# Patient Record
Sex: FEMALE | Race: WHITE | Employment: UNEMPLOYED | URBAN - NONMETROPOLITAN AREA
[De-identification: names, ages, dates, MRNs, and addresses within clinical notes are randomized per-mention and may not be internally consistent; named-entity substitution may affect disease eponyms.]

---

## 2018-02-15 ENCOUNTER — HOSPITAL ENCOUNTER (EMERGENCY)
Age: 43
Discharge: HOME | End: 2018-02-15
Attending: EMERGENCY MEDICINE
Payer: COMMERCIAL

## 2018-02-15 ENCOUNTER — APPOINTMENT (OUTPATIENT)
Dept: GENERAL RADIOLOGY | Age: 43
End: 2018-02-15
Payer: COMMERCIAL

## 2018-02-15 VITALS
HEART RATE: 85 BPM | OXYGEN SATURATION: 94 % | DIASTOLIC BLOOD PRESSURE: 77 MMHG | TEMPERATURE: 100.5 F | RESPIRATION RATE: 16 BRPM | SYSTOLIC BLOOD PRESSURE: 121 MMHG

## 2018-02-15 DIAGNOSIS — J11.1 INFLUENZA WITH RESPIRATORY MANIFESTATION OTHER THAN PNEUMONIA: Primary | ICD-10-CM

## 2018-02-15 LAB
DIRECT EXAM: NORMAL
Lab: NORMAL
SPECIMEN DESCRIPTION: NORMAL
STATUS: NORMAL

## 2018-02-15 PROCEDURE — 71046 X-RAY EXAM CHEST 2 VIEWS: CPT

## 2018-02-15 PROCEDURE — 99283 EMERGENCY DEPT VISIT LOW MDM: CPT

## 2018-02-15 PROCEDURE — 87804 INFLUENZA ASSAY W/OPTIC: CPT

## 2018-02-15 PROCEDURE — 87651 STREP A DNA AMP PROBE: CPT

## 2018-02-15 RX ORDER — BENZONATATE 100 MG/1
100 CAPSULE ORAL EVERY 4 HOURS PRN
Qty: 30 CAPSULE | Refills: 0 | Status: SHIPPED | OUTPATIENT
Start: 2018-02-15 | End: 2018-02-22

## 2018-02-15 RX ORDER — OSELTAMIVIR PHOSPHATE 75 MG/1
75 CAPSULE ORAL 2 TIMES DAILY
Qty: 10 CAPSULE | Refills: 0 | Status: SHIPPED | OUTPATIENT
Start: 2018-02-15 | End: 2018-02-20

## 2018-02-15 RX ORDER — ONDANSETRON 4 MG/1
4 TABLET, FILM COATED ORAL EVERY 4 HOURS PRN
Qty: 15 TABLET | Refills: 0 | Status: SHIPPED | OUTPATIENT
Start: 2018-02-15

## 2018-02-15 ASSESSMENT — ENCOUNTER SYMPTOMS
CONSTIPATION: 0
TROUBLE SWALLOWING: 0
VOMITING: 1
COLOR CHANGE: 0
NAUSEA: 1
COUGH: 1
BACK PAIN: 0
DIARRHEA: 0
SHORTNESS OF BREATH: 0
EYE DISCHARGE: 0
ABDOMINAL PAIN: 0
WHEEZING: 0
SORE THROAT: 1
ABDOMINAL DISTENTION: 0

## 2018-02-21 ENCOUNTER — HOSPITAL ENCOUNTER (EMERGENCY)
Age: 43
Discharge: ANOTHER HOSPITAL | End: 2018-02-22
Attending: EMERGENCY MEDICINE
Payer: COMMERCIAL

## 2018-02-21 ENCOUNTER — APPOINTMENT (OUTPATIENT)
Dept: CT IMAGING | Age: 43
End: 2018-02-21
Payer: COMMERCIAL

## 2018-02-21 VITALS
SYSTOLIC BLOOD PRESSURE: 130 MMHG | HEIGHT: 72 IN | BODY MASS INDEX: 29.12 KG/M2 | DIASTOLIC BLOOD PRESSURE: 89 MMHG | OXYGEN SATURATION: 94 % | TEMPERATURE: 99.4 F | RESPIRATION RATE: 18 BRPM | HEART RATE: 86 BPM | WEIGHT: 215 LBS

## 2018-02-21 DIAGNOSIS — R56.9 SEIZURE (HCC): Primary | ICD-10-CM

## 2018-02-21 LAB
ALBUMIN SERPL-MCNC: 3.7 G/DL (ref 3.5–5.2)
ALBUMIN/GLOBULIN RATIO: 1.2 (ref 1–2.5)
ALP BLD-CCNC: 73 U/L (ref 35–104)
ALT SERPL-CCNC: 15 U/L (ref 5–33)
ANION GAP SERPL CALCULATED.3IONS-SCNC: 11 MMOL/L (ref 9–17)
AST SERPL-CCNC: 18 U/L
BILIRUB SERPL-MCNC: 0.39 MG/DL (ref 0.3–1.2)
BUN BLDV-MCNC: 9 MG/DL (ref 6–20)
BUN/CREAT BLD: 16 (ref 9–20)
CALCIUM SERPL-MCNC: 9.2 MG/DL (ref 8.6–10.4)
CHLORIDE BLD-SCNC: 101 MMOL/L (ref 98–107)
CO2: 27 MMOL/L (ref 20–31)
CREAT SERPL-MCNC: 0.55 MG/DL (ref 0.5–0.9)
EKG ATRIAL RATE: 83 BPM
EKG P AXIS: 48 DEGREES
EKG P-R INTERVAL: 154 MS
EKG Q-T INTERVAL: 374 MS
EKG QRS DURATION: 84 MS
EKG QTC CALCULATION (BAZETT): 439 MS
EKG R AXIS: 68 DEGREES
EKG T AXIS: 25 DEGREES
EKG VENTRICULAR RATE: 83 BPM
GFR AFRICAN AMERICAN: >60 ML/MIN
GFR NON-AFRICAN AMERICAN: >60 ML/MIN
GFR SERPL CREATININE-BSD FRML MDRD: ABNORMAL ML/MIN/{1.73_M2}
GFR SERPL CREATININE-BSD FRML MDRD: ABNORMAL ML/MIN/{1.73_M2}
GLUCOSE BLD-MCNC: 295 MG/DL (ref 70–99)
HCT VFR BLD CALC: 38.8 % (ref 36–46)
HEMOGLOBIN: 12.7 G/DL (ref 12–16)
INR BLD: 1 (ref 0.9–1.2)
MCH RBC QN AUTO: 27.7 PG (ref 26–34)
MCHC RBC AUTO-ENTMCNC: 32.8 G/DL (ref 31–37)
MCV RBC AUTO: 84.4 FL (ref 80–100)
NRBC AUTOMATED: NORMAL PER 100 WBC
PDW BLD-RTO: 12.8 % (ref 12.1–15.2)
PLATELET # BLD: 336 K/UL (ref 140–450)
PMV BLD AUTO: 8.4 FL (ref 6–12)
POTASSIUM SERPL-SCNC: 4.4 MMOL/L (ref 3.7–5.3)
PROTHROMBIN TIME: 10 SEC (ref 9.7–12.2)
RBC # BLD: 4.6 M/UL (ref 4–5.2)
SODIUM BLD-SCNC: 139 MMOL/L (ref 135–144)
TOTAL PROTEIN: 6.9 G/DL (ref 6.4–8.3)
TROPONIN INTERP: NORMAL
TROPONIN T: <0.03 NG/ML
WBC # BLD: 8.8 K/UL (ref 3.5–11)

## 2018-02-21 PROCEDURE — 70450 CT HEAD/BRAIN W/O DYE: CPT

## 2018-02-21 PROCEDURE — 99285 EMERGENCY DEPT VISIT HI MDM: CPT

## 2018-02-21 PROCEDURE — 96374 THER/PROPH/DIAG INJ IV PUSH: CPT

## 2018-02-21 PROCEDURE — 80053 COMPREHEN METABOLIC PANEL: CPT

## 2018-02-21 PROCEDURE — 85027 COMPLETE CBC AUTOMATED: CPT

## 2018-02-21 PROCEDURE — 85610 PROTHROMBIN TIME: CPT

## 2018-02-21 PROCEDURE — 93005 ELECTROCARDIOGRAM TRACING: CPT

## 2018-02-21 PROCEDURE — 6360000002 HC RX W HCPCS: Performed by: EMERGENCY MEDICINE

## 2018-02-21 PROCEDURE — 84484 ASSAY OF TROPONIN QUANT: CPT

## 2018-02-21 RX ORDER — ONDANSETRON 2 MG/ML
4 INJECTION INTRAMUSCULAR; INTRAVENOUS ONCE
Status: COMPLETED | OUTPATIENT
Start: 2018-02-21 | End: 2018-02-21

## 2018-02-21 RX ORDER — OSELTAMIVIR PHOSPHATE 75 MG/1
75 CAPSULE ORAL 2 TIMES DAILY
COMMUNITY
End: 2018-03-26 | Stop reason: ALTCHOICE

## 2018-02-21 RX ADMIN — ONDANSETRON 4 MG: 2 INJECTION INTRAMUSCULAR; INTRAVENOUS at 20:48

## 2018-02-21 ASSESSMENT — ENCOUNTER SYMPTOMS
RESPIRATORY NEGATIVE: 1
SINUS PAIN: 1
VOICE CHANGE: 0
GASTROINTESTINAL NEGATIVE: 1
SINUS PRESSURE: 1
TROUBLE SWALLOWING: 0

## 2018-02-22 PROCEDURE — 6360000002 HC RX W HCPCS: Performed by: EMERGENCY MEDICINE

## 2018-02-22 PROCEDURE — 96375 TX/PRO/DX INJ NEW DRUG ADDON: CPT

## 2018-02-22 RX ORDER — PROMETHAZINE HYDROCHLORIDE 25 MG/ML
25 INJECTION, SOLUTION INTRAMUSCULAR; INTRAVENOUS ONCE
Status: COMPLETED | OUTPATIENT
Start: 2018-02-22 | End: 2018-02-22

## 2018-02-22 RX ADMIN — PROMETHAZINE HYDROCHLORIDE 25 MG: 25 INJECTION, SOLUTION INTRAMUSCULAR; INTRAVENOUS at 01:30

## 2018-02-22 NOTE — ED PROVIDER NOTES
[facility] ED  eMERGENCY dEPARTMENT eNCOUnter      Pt Name: Rocio Lucas  MRN: [id number]  [place] 1975  Date of evaluation: 2/21/2018  Provider: Shaka Conrad MD    CHIEF COMPLAINT       Chief Complaint   Patient presents with    Seizures     Onset 1855 at home. Per  \"her arms and legs went straight out and her eyes started jogging. It lasted like 30 seconds. She had some facial droop during this time on the right side\"         HISTORY OF PRESENT ILLNESS   (Location/Symptom, Timing/Onset, Context/Setting, Quality, Duration, Modifying Factors, Severity)  Note limiting factors. Rocio Lucas is a 43 y.o. female who presents to the emergency department       This is a 54-year-old female who is brought in by her  and after possible seizure activity. She was sitting at the table and just cooked dinner.  says she remained quiet and her head was tilted to the side and her eyes rolled behind the back of her head and she started shaking we'll primarily in the upper extremities. He said this lasted approximately 90 seconds he did move her to the ground and it was approximately 30 minutes later until she regained full consciousness as EMS arrived. She did appear to bite her tongue. She has no previous history of seizures she was having history of migraines and she has had a mild headache over the last day and a half but this has been her typical.  He certainly denies worsening of her life or any neurologic deficits. Her  says that after the seizure she had some right-sided facial droop. He is familiar with seizures and strokes and feels certain that it was present initially and resolved over the course of time before EMS arrived. Nursing Notes were reviewed. REVIEW OF SYSTEMS    (2-9 systems for level 4, 10 or more for level 5)     Review of Systems   Constitutional: Negative. HENT: Positive for mouth sores, sinus pain and sinus pressure.  Negative

## 2018-02-22 NOTE — ED NOTES
Bed: 12  Expected date: 2/21/18  Expected time: 7:38 PM  Means of arrival:   Comments:  antonio     Marco Antonio Mick  02/21/18 1946

## 2018-03-26 ENCOUNTER — HOSPITAL ENCOUNTER (EMERGENCY)
Age: 43
Discharge: HOME | End: 2018-03-26
Attending: EMERGENCY MEDICINE
Payer: COMMERCIAL

## 2018-03-26 VITALS
HEART RATE: 86 BPM | OXYGEN SATURATION: 99 % | SYSTOLIC BLOOD PRESSURE: 142 MMHG | TEMPERATURE: 98.3 F | DIASTOLIC BLOOD PRESSURE: 90 MMHG | RESPIRATION RATE: 16 BRPM

## 2018-03-26 DIAGNOSIS — N39.0 URINARY TRACT INFECTION WITH HEMATURIA, SITE UNSPECIFIED: Primary | ICD-10-CM

## 2018-03-26 DIAGNOSIS — R31.9 URINARY TRACT INFECTION WITH HEMATURIA, SITE UNSPECIFIED: Primary | ICD-10-CM

## 2018-03-26 LAB
-: ABNORMAL
AMORPHOUS: ABNORMAL
BACTERIA: ABNORMAL
BILIRUBIN URINE: NEGATIVE
CASTS UA: ABNORMAL /LPF
COLOR: YELLOW
COMMENT UA: ABNORMAL
CRYSTALS, UA: ABNORMAL /HPF
EPITHELIAL CELLS UA: ABNORMAL /HPF (ref 0–25)
GLUCOSE URINE: ABNORMAL
KETONES, URINE: NEGATIVE
LEUKOCYTE ESTERASE, URINE: NEGATIVE
MUCUS: ABNORMAL
NITRITE, URINE: POSITIVE
OTHER OBSERVATIONS UA: ABNORMAL
PH UA: 5.5 (ref 5–9)
PROTEIN UA: ABNORMAL
RBC UA: ABNORMAL /HPF (ref 0–2)
RENAL EPITHELIAL, UA: ABNORMAL /HPF
SPECIFIC GRAVITY UA: 1.02 (ref 1.01–1.02)
TRICHOMONAS: ABNORMAL
TURBIDITY: CLEAR
URINE HGB: ABNORMAL
UROBILINOGEN, URINE: NORMAL
WBC UA: ABNORMAL /HPF (ref 0–5)
YEAST: ABNORMAL

## 2018-03-26 PROCEDURE — 81001 URINALYSIS AUTO W/SCOPE: CPT

## 2018-03-26 PROCEDURE — 87086 URINE CULTURE/COLONY COUNT: CPT

## 2018-03-26 PROCEDURE — 99283 EMERGENCY DEPT VISIT LOW MDM: CPT

## 2018-03-26 PROCEDURE — 6370000000 HC RX 637 (ALT 250 FOR IP): Performed by: EMERGENCY MEDICINE

## 2018-03-26 PROCEDURE — 87088 URINE BACTERIA CULTURE: CPT

## 2018-03-26 PROCEDURE — 87186 SC STD MICRODIL/AGAR DIL: CPT

## 2018-03-26 RX ORDER — CEPHALEXIN 500 MG/1
500 CAPSULE ORAL 2 TIMES DAILY
Qty: 14 CAPSULE | Refills: 0 | Status: SHIPPED | OUTPATIENT
Start: 2018-03-26 | End: 2018-04-02

## 2018-03-26 RX ORDER — CEPHALEXIN 500 MG/1
500 CAPSULE ORAL ONCE
Status: COMPLETED | OUTPATIENT
Start: 2018-03-26 | End: 2018-03-26

## 2018-03-26 RX ADMIN — CEPHALEXIN 500 MG: 500 CAPSULE ORAL at 15:33

## 2018-03-26 ASSESSMENT — ENCOUNTER SYMPTOMS
SHORTNESS OF BREATH: 0
VOMITING: 0
DIARRHEA: 0
ABDOMINAL DISTENTION: 0
RHINORRHEA: 0
NAUSEA: 0
WHEEZING: 0
COUGH: 0
SORE THROAT: 0
CONSTIPATION: 0

## 2018-03-26 ASSESSMENT — PAIN DESCRIPTION - DESCRIPTORS: DESCRIPTORS: BURNING

## 2018-03-26 ASSESSMENT — PAIN DESCRIPTION - FREQUENCY: FREQUENCY: CONTINUOUS

## 2018-03-26 ASSESSMENT — PAIN DESCRIPTION - LOCATION: LOCATION: OTHER (COMMENT)

## 2018-03-26 ASSESSMENT — PAIN DESCRIPTION - PAIN TYPE: TYPE: ACUTE PAIN

## 2018-03-26 ASSESSMENT — PAIN SCALES - GENERAL: PAINLEVEL_OUTOF10: 7

## 2018-03-26 NOTE — ED PROVIDER NOTES
decreased urine volume, menstrual problem, vaginal discharge and vaginal pain. Skin: Negative for rash. Neurological: Negative for dizziness, weakness, light-headedness, numbness and headaches. PHYSICAL EXAM   (up to 7 for level 4, 8 or more for level 5)      INITIAL VITALS:   BP (!) 159/91   Pulse 86   Temp 98.3 °F (36.8 °C) (Tympanic)   Resp 16   SpO2 98%     Physical Exam   Constitutional: She is oriented to person, place, and time. Nontoxic appearing, answering all questions appropriately no signs of distress   HENT:   Head: Normocephalic and atraumatic. Eyes: Conjunctivae are normal. Right eye exhibits no discharge. Left eye exhibits no discharge. Cardiovascular: Normal rate, regular rhythm and normal heart sounds. Exam reveals no gallop and no friction rub. No murmur heard. Pulmonary/Chest: Effort normal and breath sounds normal. No respiratory distress. She has no wheezes. She has no rales. She exhibits no tenderness. Abdominal: Soft. She exhibits no distension and no mass. There is tenderness. There is no rebound and no guarding. Mild suprapubic tenderness to palpation, no CVA tenderness noted bilaterally   Neurological: She is alert and oriented to person, place, and time. Skin: Skin is warm and dry. No rash noted. Nursing note and vitals reviewed. DIFFERENTIAL  DIAGNOSIS     Hematuria, suprapubic pain, patient is status post hysterectomy, concern for UTI, plan for urinalysis. sHe is afebrile, no CVA tenderness.     PLAN (LABS / IMAGING / EKG):  Orders Placed This Encounter   Procedures    Urinalysis    Microscopic Urinalysis       MEDICATIONS ORDERED:  Orders Placed This Encounter   Medications    cephALEXin (KEFLEX) 500 MG capsule     Sig: Take 1 capsule by mouth 2 times daily for 7 days     Dispense:  14 capsule     Refill:  0       DIAGNOSTIC RESULTS / EMERGENCY DEPARTMENT COURSE / MDM     LABS:  Results for orders placed or performed during the hospital

## 2018-03-28 LAB
CULTURE: ABNORMAL
CULTURE: ABNORMAL
Lab: ABNORMAL
Lab: ABNORMAL
ORGANISM: ABNORMAL
SPECIMEN DESCRIPTION: ABNORMAL
SPECIMEN DESCRIPTION: ABNORMAL
STATUS: ABNORMAL

## 2018-09-18 ENCOUNTER — HOSPITAL ENCOUNTER (OUTPATIENT)
Age: 43
Discharge: HOME | End: 2018-09-18
Payer: COMMERCIAL

## 2018-09-18 LAB
C-PEPTIDE: 0.1 NG/ML (ref 1.1–4.4)
ESTIMATED AVERAGE GLUCOSE: 255 MG/DL
HBA1C MFR BLD: 10.5 % (ref 4.8–5.9)

## 2018-09-18 PROCEDURE — 83036 HEMOGLOBIN GLYCOSYLATED A1C: CPT

## 2018-09-18 PROCEDURE — 36415 COLL VENOUS BLD VENIPUNCTURE: CPT

## 2018-09-18 PROCEDURE — 84681 ASSAY OF C-PEPTIDE: CPT

## 2018-09-28 ENCOUNTER — HOSPITAL ENCOUNTER (EMERGENCY)
Age: 43
Discharge: ANOTHER HOSPITAL | End: 2018-09-28
Payer: COMMERCIAL

## 2018-09-28 ENCOUNTER — APPOINTMENT (OUTPATIENT)
Dept: GENERAL RADIOLOGY | Age: 43
End: 2018-09-28
Payer: COMMERCIAL

## 2018-09-28 ENCOUNTER — APPOINTMENT (OUTPATIENT)
Dept: CT IMAGING | Age: 43
End: 2018-09-28
Payer: COMMERCIAL

## 2018-09-28 VITALS
SYSTOLIC BLOOD PRESSURE: 129 MMHG | HEART RATE: 93 BPM | BODY MASS INDEX: 29.03 KG/M2 | OXYGEN SATURATION: 98 % | DIASTOLIC BLOOD PRESSURE: 84 MMHG | WEIGHT: 220 LBS | RESPIRATION RATE: 16 BRPM | TEMPERATURE: 98.4 F

## 2018-09-28 DIAGNOSIS — R56.9 SEIZURE (HCC): Primary | ICD-10-CM

## 2018-09-28 LAB
ABSOLUTE EOS #: 0.12 K/UL (ref 0–0.44)
ABSOLUTE IMMATURE GRANULOCYTE: 0.04 K/UL (ref 0–0.3)
ABSOLUTE LYMPH #: 5.23 K/UL (ref 1.1–3.7)
ABSOLUTE MONO #: 1.1 K/UL (ref 0.1–1.2)
ALBUMIN SERPL-MCNC: 4.6 G/DL (ref 3.5–5.2)
ALBUMIN/GLOBULIN RATIO: 1.5 (ref 1–2.5)
ALP BLD-CCNC: 87 U/L (ref 35–104)
ALT SERPL-CCNC: 10 U/L (ref 5–33)
AMPHETAMINE SCREEN URINE: NEGATIVE
ANION GAP SERPL CALCULATED.3IONS-SCNC: 27 MMOL/L (ref 9–17)
AST SERPL-CCNC: 14 U/L
BARBITURATE SCREEN URINE: NEGATIVE
BASOPHILS # BLD: 1 % (ref 0–2)
BASOPHILS ABSOLUTE: 0.07 K/UL (ref 0–0.2)
BENZODIAZEPINE SCREEN, URINE: NEGATIVE
BILIRUB SERPL-MCNC: 0.4 MG/DL (ref 0.3–1.2)
BUN BLDV-MCNC: 9 MG/DL (ref 6–20)
BUN/CREAT BLD: 11 (ref 9–20)
BUPRENORPHINE URINE: NEGATIVE
CALCIUM SERPL-MCNC: 9.3 MG/DL (ref 8.6–10.4)
CANNABINOID SCREEN URINE: NEGATIVE
CHLORIDE BLD-SCNC: 94 MMOL/L (ref 98–107)
CO2: 17 MMOL/L (ref 20–31)
COCAINE METABOLITE, URINE: NEGATIVE
CREAT SERPL-MCNC: 0.81 MG/DL (ref 0.5–0.9)
DIFFERENTIAL TYPE: ABNORMAL
EOSINOPHILS RELATIVE PERCENT: 1 % (ref 1–4)
GFR AFRICAN AMERICAN: >60 ML/MIN
GFR NON-AFRICAN AMERICAN: >60 ML/MIN
GFR SERPL CREATININE-BSD FRML MDRD: ABNORMAL ML/MIN/{1.73_M2}
GFR SERPL CREATININE-BSD FRML MDRD: ABNORMAL ML/MIN/{1.73_M2}
GLUCOSE BLD-MCNC: 137 MG/DL (ref 74–100)
GLUCOSE BLD-MCNC: 247 MG/DL (ref 70–99)
HCT VFR BLD CALC: 46.7 % (ref 36.3–47.1)
HEMOGLOBIN: 14.2 G/DL (ref 11.9–15.1)
IMMATURE GRANULOCYTES: 0 %
LYMPHOCYTES # BLD: 36 % (ref 24–43)
MCH RBC QN AUTO: 28.4 PG (ref 25.2–33.5)
MCHC RBC AUTO-ENTMCNC: 30.4 G/DL (ref 28.4–34.8)
MCV RBC AUTO: 93.4 FL (ref 82.6–102.9)
MDMA URINE: NORMAL
METHADONE SCREEN, URINE: NEGATIVE
METHAMPHETAMINE, URINE: NEGATIVE
MONOCYTES # BLD: 8 % (ref 3–12)
NRBC AUTOMATED: 0 PER 100 WBC
OPIATES, URINE: NEGATIVE
OXYCODONE SCREEN URINE: NEGATIVE
PDW BLD-RTO: 12.4 % (ref 11.8–14.4)
PHENCYCLIDINE, URINE: NEGATIVE
PLATELET # BLD: 325 K/UL (ref 138–453)
PLATELET ESTIMATE: ABNORMAL
PMV BLD AUTO: 10.9 FL (ref 8.1–13.5)
POTASSIUM SERPL-SCNC: 3.8 MMOL/L (ref 3.7–5.3)
PROPOXYPHENE, URINE: NEGATIVE
RBC # BLD: 5 M/UL (ref 3.95–5.11)
RBC # BLD: ABNORMAL 10*6/UL
SEG NEUTROPHILS: 54 % (ref 36–65)
SEGMENTED NEUTROPHILS ABSOLUTE COUNT: 7.87 K/UL (ref 1.5–8.1)
SODIUM BLD-SCNC: 138 MMOL/L (ref 135–144)
TEST INFORMATION: NORMAL
TOTAL PROTEIN: 7.7 G/DL (ref 6.4–8.3)
TRICYCLIC ANTIDEPRESSANTS, UR: NEGATIVE
WBC # BLD: 14.4 K/UL (ref 3.5–11.3)
WBC # BLD: ABNORMAL 10*3/UL

## 2018-09-28 PROCEDURE — 80306 DRUG TEST PRSMV INSTRMNT: CPT

## 2018-09-28 PROCEDURE — 85025 COMPLETE CBC W/AUTO DIFF WBC: CPT

## 2018-09-28 PROCEDURE — 6370000000 HC RX 637 (ALT 250 FOR IP): Performed by: PHYSICIAN ASSISTANT

## 2018-09-28 PROCEDURE — 80053 COMPREHEN METABOLIC PANEL: CPT

## 2018-09-28 PROCEDURE — 71046 X-RAY EXAM CHEST 2 VIEWS: CPT

## 2018-09-28 PROCEDURE — 99285 EMERGENCY DEPT VISIT HI MDM: CPT

## 2018-09-28 PROCEDURE — 70450 CT HEAD/BRAIN W/O DYE: CPT

## 2018-09-28 PROCEDURE — 82947 ASSAY GLUCOSE BLOOD QUANT: CPT

## 2018-09-28 RX ORDER — NICOTINE 21 MG/24HR
1 PATCH, TRANSDERMAL 24 HOURS TRANSDERMAL DAILY
Status: DISCONTINUED | OUTPATIENT
Start: 2018-09-28 | End: 2018-09-29 | Stop reason: HOSPADM

## 2018-09-28 RX ORDER — IBUPROFEN 600 MG/1
600 TABLET ORAL ONCE
Status: COMPLETED | OUTPATIENT
Start: 2018-09-28 | End: 2018-09-28

## 2018-09-28 RX ADMIN — IBUPROFEN 600 MG: 600 TABLET ORAL at 18:40

## 2018-09-28 ASSESSMENT — ENCOUNTER SYMPTOMS
WHEEZING: 0
SHORTNESS OF BREATH: 0
VOMITING: 0
PHOTOPHOBIA: 0
NAUSEA: 0

## 2018-09-28 ASSESSMENT — PAIN DESCRIPTION - LOCATION: LOCATION: HEAD

## 2018-09-28 ASSESSMENT — PAIN DESCRIPTION - PAIN TYPE: TYPE: ACUTE PAIN

## 2018-09-28 ASSESSMENT — PAIN SCALES - GENERAL: PAINLEVEL_OUTOF10: 5

## 2018-09-28 ASSESSMENT — PAIN DESCRIPTION - DESCRIPTORS: DESCRIPTORS: ACHING

## 2018-09-28 NOTE — ED PROVIDER NOTES
HPI & PMH otherwise negative    PHYSICAL EXAM    (up to 7 for level 4, 8 or more for level 5)     ED Triage Vitals [09/28/18 1618]   BP Temp Temp Source Pulse Resp SpO2 Height Weight   127/81 98.4 °F (36.9 °C) Tympanic 93 16 97 % -- 220 lb (99.8 kg)       Physical Exam   Constitutional: She is oriented to person, place, and time. She appears well-developed and well-nourished. HENT:   Head: Normocephalic and atraumatic. Eyes: Pupils are equal, round, and reactive to light. Conjunctivae are normal. No scleral icterus. Neck: Normal range of motion. Neck supple. Cardiovascular: Normal rate and regular rhythm. Pulmonary/Chest: Effort normal and breath sounds normal.   Abdominal: Soft. Bowel sounds are normal.   Musculoskeletal: Normal range of motion. She exhibits no tenderness. Neurological: She is alert and oriented to person, place, and time. No cranial nerve deficit. Coordination normal.   Skin: Skin is warm and dry. Psychiatric: She has a normal mood and affect. Her behavior is normal. Judgment and thought content normal.   Nursing note and vitals reviewed. DIAGNOSTIC RESULTS     EKG: (none if blank)  All EKG's are interpreted by the Emergency Department Physician who either signs or Co-signs this chart in the absence of a cardiologist.    Normal sinus rhythm without ischemic changes    RADIOLOGY: (none if blank)   Interpretation per the Radiologist below, if available at the time of this note:    CT Head WO Contrast   Final Result   No acute intracranial abnormality. XR CHEST STANDARD (2 VW)   Final Result   No acute intrathoracic process.              LABS:  Labs Reviewed   CBC WITH AUTO DIFFERENTIAL - Abnormal; Notable for the following:        Result Value    WBC 14.4 (*)     Absolute Lymph # 5.23 (*)     All other components within normal limits   COMPREHENSIVE METABOLIC PANEL - Abnormal; Notable for the following:     Glucose 247 (*)     Chloride 94 (*)     CO2 17 (*)     Anion Gap

## 2018-09-29 ENCOUNTER — HOSPITAL ENCOUNTER (INPATIENT)
Age: 43
LOS: 1 days | Discharge: HOME | DRG: 053 | End: 2018-09-29
Attending: INTERNAL MEDICINE | Admitting: INTERNAL MEDICINE
Payer: COMMERCIAL

## 2018-09-29 VITALS
WEIGHT: 220.24 LBS | OXYGEN SATURATION: 99 % | SYSTOLIC BLOOD PRESSURE: 123 MMHG | DIASTOLIC BLOOD PRESSURE: 69 MMHG | RESPIRATION RATE: 18 BRPM | BODY MASS INDEX: 29.83 KG/M2 | TEMPERATURE: 98.2 F | HEIGHT: 72 IN | HEART RATE: 80 BPM

## 2018-09-29 DIAGNOSIS — R56.9 SEIZURE (HCC): Primary | ICD-10-CM

## 2018-09-29 PROBLEM — Z96.41 INSULIN PUMP IN PLACE: Chronic | Status: ACTIVE | Noted: 2018-09-29

## 2018-09-29 PROBLEM — Z72.0 TOBACCO ABUSE DISORDER: Chronic | Status: ACTIVE | Noted: 2018-09-29

## 2018-09-29 PROBLEM — E10.9 TYPE 1 DIABETES MELLITUS WITHOUT COMPLICATION (HCC): Chronic | Status: ACTIVE | Noted: 2018-09-29

## 2018-09-29 LAB
GLUCOSE BLD-MCNC: 113 MG/DL (ref 65–105)
GLUCOSE BLD-MCNC: 180 MG/DL (ref 65–105)
GLUCOSE BLD-MCNC: 302 MG/DL (ref 65–105)
GLUCOSE BLD-MCNC: 402 MG/DL (ref 65–105)

## 2018-09-29 PROCEDURE — G0378 HOSPITAL OBSERVATION PER HR: HCPCS

## 2018-09-29 PROCEDURE — 6370000000 HC RX 637 (ALT 250 FOR IP): Performed by: NURSE PRACTITIONER

## 2018-09-29 PROCEDURE — 83036 HEMOGLOBIN GLYCOSYLATED A1C: CPT

## 2018-09-29 PROCEDURE — 2580000003 HC RX 258: Performed by: NURSE PRACTITIONER

## 2018-09-29 PROCEDURE — 82947 ASSAY GLUCOSE BLOOD QUANT: CPT

## 2018-09-29 PROCEDURE — 1200000000 HC SEMI PRIVATE

## 2018-09-29 PROCEDURE — 99254 IP/OBS CNSLTJ NEW/EST MOD 60: CPT | Performed by: PSYCHIATRY & NEUROLOGY

## 2018-09-29 PROCEDURE — 36415 COLL VENOUS BLD VENIPUNCTURE: CPT

## 2018-09-29 PROCEDURE — 6370000000 HC RX 637 (ALT 250 FOR IP): Performed by: INTERNAL MEDICINE

## 2018-09-29 PROCEDURE — G0379 DIRECT REFER HOSPITAL OBSERV: HCPCS

## 2018-09-29 PROCEDURE — 96372 THER/PROPH/DIAG INJ SC/IM: CPT

## 2018-09-29 PROCEDURE — 95816 EEG AWAKE AND DROWSY: CPT | Performed by: PSYCHIATRY & NEUROLOGY

## 2018-09-29 PROCEDURE — 6360000002 HC RX W HCPCS: Performed by: NURSE PRACTITIONER

## 2018-09-29 PROCEDURE — 95819 EEG AWAKE AND ASLEEP: CPT

## 2018-09-29 PROCEDURE — 99239 HOSP IP/OBS DSCHRG MGMT >30: CPT | Performed by: INTERNAL MEDICINE

## 2018-09-29 RX ORDER — ONDANSETRON 2 MG/ML
4 INJECTION INTRAMUSCULAR; INTRAVENOUS EVERY 6 HOURS PRN
Status: DISCONTINUED | OUTPATIENT
Start: 2018-09-29 | End: 2018-09-29 | Stop reason: HOSPADM

## 2018-09-29 RX ORDER — LEVETIRACETAM 500 MG/1
500 TABLET ORAL 2 TIMES DAILY
Qty: 60 TABLET | Refills: 3 | Status: SHIPPED | OUTPATIENT
Start: 2018-09-29 | End: 2018-12-21

## 2018-09-29 RX ORDER — LORAZEPAM 2 MG/ML
2 INJECTION INTRAMUSCULAR EVERY 4 HOURS PRN
Status: DISCONTINUED | OUTPATIENT
Start: 2018-09-29 | End: 2018-09-29 | Stop reason: HOSPADM

## 2018-09-29 RX ORDER — SODIUM CHLORIDE 0.9 % (FLUSH) 0.9 %
10 SYRINGE (ML) INJECTION PRN
Status: DISCONTINUED | OUTPATIENT
Start: 2018-09-29 | End: 2018-09-29 | Stop reason: HOSPADM

## 2018-09-29 RX ORDER — DEXTROSE MONOHYDRATE 50 MG/ML
100 INJECTION, SOLUTION INTRAVENOUS PRN
Status: DISCONTINUED | OUTPATIENT
Start: 2018-09-29 | End: 2018-09-29 | Stop reason: HOSPADM

## 2018-09-29 RX ORDER — INSULIN GLARGINE 100 [IU]/ML
30 INJECTION, SOLUTION SUBCUTANEOUS 2 TIMES DAILY
Status: DISCONTINUED | OUTPATIENT
Start: 2018-09-29 | End: 2018-09-29

## 2018-09-29 RX ORDER — IPRATROPIUM BROMIDE AND ALBUTEROL SULFATE 2.5; .5 MG/3ML; MG/3ML
1 SOLUTION RESPIRATORY (INHALATION) EVERY 4 HOURS PRN
Status: DISCONTINUED | OUTPATIENT
Start: 2018-09-29 | End: 2018-09-29 | Stop reason: HOSPADM

## 2018-09-29 RX ORDER — ALBUTEROL SULFATE 2.5 MG/3ML
2.5 SOLUTION RESPIRATORY (INHALATION)
Status: DISCONTINUED | OUTPATIENT
Start: 2018-09-29 | End: 2018-09-29 | Stop reason: HOSPADM

## 2018-09-29 RX ORDER — NICOTINE 21 MG/24HR
1 PATCH, TRANSDERMAL 24 HOURS TRANSDERMAL DAILY
Status: DISCONTINUED | OUTPATIENT
Start: 2018-09-29 | End: 2018-09-29 | Stop reason: HOSPADM

## 2018-09-29 RX ORDER — BISACODYL 10 MG
10 SUPPOSITORY, RECTAL RECTAL DAILY PRN
Status: DISCONTINUED | OUTPATIENT
Start: 2018-09-29 | End: 2018-09-29 | Stop reason: HOSPADM

## 2018-09-29 RX ORDER — DEXTROSE MONOHYDRATE 25 G/50ML
12.5 INJECTION, SOLUTION INTRAVENOUS PRN
Status: DISCONTINUED | OUTPATIENT
Start: 2018-09-29 | End: 2018-09-29 | Stop reason: HOSPADM

## 2018-09-29 RX ORDER — NICOTINE POLACRILEX 4 MG
15 LOZENGE BUCCAL PRN
Status: DISCONTINUED | OUTPATIENT
Start: 2018-09-29 | End: 2018-09-29 | Stop reason: HOSPADM

## 2018-09-29 RX ORDER — SODIUM CHLORIDE 0.9 % (FLUSH) 0.9 %
10 SYRINGE (ML) INJECTION EVERY 12 HOURS SCHEDULED
Status: DISCONTINUED | OUTPATIENT
Start: 2018-09-29 | End: 2018-09-29 | Stop reason: HOSPADM

## 2018-09-29 RX ORDER — ACETAMINOPHEN 325 MG/1
650 TABLET ORAL EVERY 4 HOURS PRN
Status: DISCONTINUED | OUTPATIENT
Start: 2018-09-29 | End: 2018-09-29 | Stop reason: HOSPADM

## 2018-09-29 RX ORDER — SODIUM CHLORIDE 9 MG/ML
INJECTION, SOLUTION INTRAVENOUS CONTINUOUS
Status: DISCONTINUED | OUTPATIENT
Start: 2018-09-29 | End: 2018-09-29 | Stop reason: HOSPADM

## 2018-09-29 RX ORDER — NICOTINE 21 MG/24HR
1 PATCH, TRANSDERMAL 24 HOURS TRANSDERMAL DAILY PRN
Status: DISCONTINUED | OUTPATIENT
Start: 2018-09-29 | End: 2018-09-29 | Stop reason: HOSPADM

## 2018-09-29 RX ORDER — ALBUTEROL SULFATE 90 UG/1
2 AEROSOL, METERED RESPIRATORY (INHALATION) EVERY 6 HOURS PRN
Status: DISCONTINUED | OUTPATIENT
Start: 2018-09-29 | End: 2018-09-29 | Stop reason: HOSPADM

## 2018-09-29 RX ORDER — SERTRALINE HYDROCHLORIDE 100 MG/1
200 TABLET, FILM COATED ORAL NIGHTLY
COMMUNITY

## 2018-09-29 RX ADMIN — SODIUM CHLORIDE: 9 INJECTION, SOLUTION INTRAVENOUS at 01:22

## 2018-09-29 RX ADMIN — ENOXAPARIN SODIUM 40 MG: 40 INJECTION SUBCUTANEOUS at 09:34

## 2018-09-29 RX ADMIN — ACETAMINOPHEN 650 MG: 325 TABLET ORAL at 01:54

## 2018-09-29 RX ADMIN — INSULIN LISPRO 12 UNITS: 100 INJECTION, SOLUTION INTRAVENOUS; SUBCUTANEOUS at 10:22

## 2018-09-29 RX ADMIN — INSULIN LISPRO 3 UNITS: 100 INJECTION, SOLUTION INTRAVENOUS; SUBCUTANEOUS at 17:09

## 2018-09-29 RX ADMIN — Medication 10 ML: at 09:34

## 2018-09-29 RX ADMIN — INSULIN LISPRO 12 UNITS: 100 INJECTION, SOLUTION INTRAVENOUS; SUBCUTANEOUS at 13:17

## 2018-09-29 ASSESSMENT — PAIN SCALES - GENERAL
PAINLEVEL_OUTOF10: 6
PAINLEVEL_OUTOF10: 0
PAINLEVEL_OUTOF10: 6

## 2018-09-29 ASSESSMENT — PAIN DESCRIPTION - PAIN TYPE: TYPE: ACUTE PAIN

## 2018-09-29 ASSESSMENT — PAIN DESCRIPTION - ONSET: ONSET: SUDDEN

## 2018-09-29 ASSESSMENT — PAIN DESCRIPTION - LOCATION: LOCATION: HEAD

## 2018-09-29 NOTE — CONSULTS
atrophy    MOTOR FUNCTION:  Bulk R side:Normal            L side:Normal  Tone  R side:Normal            L side:Normal   Power 5/5 in upper and lower extremties;      no involuntary movements, no tremor     SENSORY FUNCTION:                      Extremities: Touch     R side:Normal                 L side:Normal     Pain        R side:Normal                  L side:Normal  Vibration  R side:Normal                  L side:Normal    Proprioception    R side:Normal                             L side:Normal    Peripheral pulses palpable, no pedal edema or calf pain with palpation   CEREBELLAR FUNCTION:  Heel to Shin:     Right side:  normal                             Left side:  normal    Finger to Nose:   Right:  normal                              Left:  normal            REFLEX FUNCTION:  Symmetric, no perverted reflex,      Right Bicep:  2+  Left Bicep:  2+  Right Knee:  2+  Left Knee:  2+    Babinski sign   Right side:Downgoing                          Left side   :Downgoing   STATION and GAIT  Not tested           Data:    Lab Results:     Lab Results   Component Value Date    CHOL 187 11/01/2016    LDLCHOLESTEROL 104 11/01/2016    HDL 62 11/01/2016    TRIG 105 11/01/2016    ALT 10 09/28/2018    AST 14 09/28/2018    TSH 1.63 01/17/2017    INR 1.0 02/21/2018    LABA1C 10.5 (H) 09/18/2018    LABMICR 5 11/01/2016     Hematology:  Recent Labs      09/28/18   1605   WBC  14.4*   HGB  14.2   HCT  46.7   PLT  325     Chemistry:  Recent Labs      09/28/18   1605   NA  138   K  3.8   CL  94*   CO2  17*   GLUCOSE  247*   BUN  9   CREATININE  0.81   CALCIUM  9.3     Recent Labs      09/28/18   1605   PROT  7.7   LABALBU  4.6   AST  14   ALT  10       No results found for: PHENYTOIN, PHENYTOIN, VALPROATE, CBMZ        Diagnostic data reviewed:      Impression and Plan:     Ms. Patricio Andrade is a 37 y.o. female with loss of consciousness in the setting of vasovagal syncope.   On examination the patient was not postictal. Plan  -Routine EEG to rule out subclinical seizures  -MRI brain  with and without contrast epilepsy protocol  -Hold off on starting antiepileptics until EEG is obtained      Discussed with patient and spouse and Nurse. Case discussed with Dr. Eloina Painting   Will follow with you. Thank you for consultation.          Vinod Soteloger: [phone]  Electronically signed 9/29/2018 at 3:46 AM

## 2018-09-29 NOTE — PROGRESS NOTES
Physical Therapy  DATE: 2018    NAME: Lei Crenshaw  MRN: [id number]   : 1975    Patient not seen this date for Physical Therapy due to:  [] Blood transfusion in progress  [] Hemodialysis  []  Patient Declined  [] Spine Precautions   [] Strict Bedrest  [] Surgery/ Procedure  [] Testing      [] Other        [x] PT being discontinued at this time. Patient independent. No further needs.-spoke with pt and Jennifer RN-defer PT eval   [] PT being discontinued at this time as the patient has been transferred to palliative care. No further needs.     Francois Live, PT

## 2018-09-29 NOTE — PLAN OF CARE
[facility] ([address] [R56.9] . Vitals:    09/29/18 0045   BP: 116/64   Pulse: 88   Resp: 18   Temp: 99.4 °F (37.4 °C)   . Patients home meds are   Prior to Admission medications    Medication Sig Start Date End Date Taking? Authorizing Provider   sertraline (ZOLOFT) 100 MG tablet Take 150 mg by mouth nightly   Yes Historical Provider, MD   ondansetron (ZOFRAN) 4 MG tablet Take 1 tablet by mouth every 4 hours as needed for Nausea or Vomiting 2/15/18   Emelia Ragsdale MD   albuterol sulfate  (90 BASE) MCG/ACT inhaler Inhale 2 puffs into the lungs every 6 hours as needed for Wheezing    Historical Provider, MD   ipratropium-albuterol (DUONEB) 0.5-2.5 (3) MG/3ML SOLN nebulizer solution Inhale 3 mLs into the lungs every 4 hours as needed for Shortness of Breath 1/17/17 3/26/18  Lilia Cabezas PA-C   oxyCODONE-acetaminophen (PERCOCET) 5-325 MG per tablet Take 1 tablet by mouth once. Historical Provider, MD   insulin lispro (HUMALOG) 100 UNIT/ML injection vial Take as directed 9/4/14   Kevin Kelsey MD   ibuprofen (ADVIL;MOTRIN) 800 MG tablet Take 800 mg by mouth every 6 hours as needed for Pain. Historical Provider, MD   diphenhydrAMINE (BENADRYL) 50 MG capsule Take 50 mg by mouth nightly as needed. Historical Provider, MD   EPINEPHrine (EPIPEN) 0.3 MG/0.3ML RAMA injection Inject 0.3 mLs into the muscle once as needed for 1 dose. Use as directed for allergic reaction 5/22/13   Zeinab Joseph MD   insulin lispro (HUMALOG) 100 UNIT/ML injection Inject  into the skin. Insulin pump     Historical Provider, MD       Assessment: Pt has no hx of COPD, sleep apnea, or asthma. Pt has PRN albuterol at home for chronic bronchitis. Pt is a current smoker- 31 years, 4-6 cigarettes per day.       RR 16   Breath Sounds: Clear      · Bronchodilator assessment at level  1  · Hyperinflation assessment at level   · Secretion Management assessment at level

## 2018-09-29 NOTE — H&P
POC Glucose 180 (H) 65 - 105 mg/dL       Imaging/Diagnostics:    TWO VIEWS OF THE CHEST   9/28/2018 4:54 pm  Impression No acute intrathoracic process. CT OF THE HEAD WITHOUT CONTRAST  9/28/2018 4:56 pm    Impression No acute intracranial abnormality. Assessment :      Primary Problem  Seizure [facility])    Active Hospital Problems    Diagnosis Date Noted    Tobacco abuse disorder [Z72.0] 09/29/2018    Type 1 diabetes mellitus without complication ([facility] [address] [Y08.7] 09/29/2018    Insulin pump in place [Z96.41] 09/29/2018    Seizure ([facility] [address] [R56.9] 09/28/2018       Plan:     Patient status Admit as inpatient in the  Progressive Unit/Step down    Principal Problem:    Seizure ([facility] [address]  Active Problems:    Tobacco abuse disorder    Type 1 diabetes mellitus without complication ([facility])    Insulin pump in place  Resolved Problems:    * No resolved hospital problems. *      Seizure Like activity, Second episode. Last episode in February. CT Head normal. EEG. MRI. Neurology on board. Seizure precautions. Tobacco Abuse Disorder. Counseled on cessation. PRN Nicotine patch. DM type 1: Patient has insulin pump. Getting humalog to fill pump. Insulin sliding scale, Hypoglycemia protocol    HTN: stable,resume home meds    Check Electrolytes and Electrolytes replacement as needed     DVT prophylaxis: Lovenox 40 mg SC    PT, OT as needed. IV Fluids:   dextrose    sodium chloride Last Rate: 75 mL/hr at 09/29/18 0122    insulin lispro,    Nutrition:  DIET GENERAL;      Consultations:   IP CONSULT TO NEUROLOGY    Patient is admitted as inpatient status because of co-morbidities listed above, severity of signs and symptoms as outlined, requirement for current medical therapies and most importantly because of direct risk to patient if care not provided in a hospital setting.       Glendy Henry MD  9/29/2018  5:09 PM    Copy sent to Dr. Domonique Whitehead PA-C

## 2018-09-30 LAB
ESTIMATED AVERAGE GLUCOSE: 258 MG/DL
HBA1C MFR BLD: 10.6 % (ref 4–6)

## 2018-12-05 ENCOUNTER — HOSPITAL ENCOUNTER (OUTPATIENT)
Age: 43
Discharge: HOME | End: 2018-12-05
Payer: COMMERCIAL

## 2018-12-05 LAB
ESTIMATED AVERAGE GLUCOSE: 235 MG/DL
HBA1C MFR BLD: 9.8 % (ref 4.8–5.9)

## 2018-12-05 PROCEDURE — 83036 HEMOGLOBIN GLYCOSYLATED A1C: CPT

## 2018-12-05 PROCEDURE — 36415 COLL VENOUS BLD VENIPUNCTURE: CPT

## 2018-12-21 ENCOUNTER — OFFICE VISIT (OUTPATIENT)
Dept: NEUROLOGY | Age: 43
End: 2018-12-21
Payer: COMMERCIAL

## 2018-12-21 VITALS
WEIGHT: 202 LBS | SYSTOLIC BLOOD PRESSURE: 130 MMHG | BODY MASS INDEX: 27.36 KG/M2 | HEIGHT: 72 IN | RESPIRATION RATE: 16 BRPM | HEART RATE: 76 BPM | DIASTOLIC BLOOD PRESSURE: 86 MMHG

## 2018-12-21 DIAGNOSIS — Z85.41 HISTORY OF CERVICAL CANCER: ICD-10-CM

## 2018-12-21 DIAGNOSIS — Z87.898 HISTORY OF HEADACHE: ICD-10-CM

## 2018-12-21 DIAGNOSIS — G40.109 LOCALIZATION-RELATED EPILEPSY (HCC): Primary | ICD-10-CM

## 2018-12-21 PROCEDURE — 4004F PT TOBACCO SCREEN RCVD TLK: CPT | Performed by: STUDENT IN AN ORGANIZED HEALTH CARE EDUCATION/TRAINING PROGRAM

## 2018-12-21 PROCEDURE — G8419 CALC BMI OUT NRM PARAM NOF/U: HCPCS | Performed by: STUDENT IN AN ORGANIZED HEALTH CARE EDUCATION/TRAINING PROGRAM

## 2018-12-21 PROCEDURE — G8427 DOCREV CUR MEDS BY ELIG CLIN: HCPCS | Performed by: STUDENT IN AN ORGANIZED HEALTH CARE EDUCATION/TRAINING PROGRAM

## 2018-12-21 PROCEDURE — 99215 OFFICE O/P EST HI 40 MIN: CPT | Performed by: STUDENT IN AN ORGANIZED HEALTH CARE EDUCATION/TRAINING PROGRAM

## 2018-12-21 PROCEDURE — G8484 FLU IMMUNIZE NO ADMIN: HCPCS | Performed by: STUDENT IN AN ORGANIZED HEALTH CARE EDUCATION/TRAINING PROGRAM

## 2018-12-21 RX ORDER — LEVETIRACETAM 250 MG/1
750 TABLET ORAL 2 TIMES DAILY
Qty: 180 TABLET | Refills: 1 | Status: SHIPPED | OUTPATIENT
Start: 2018-12-21 | End: 2019-02-08 | Stop reason: SDUPTHER

## 2018-12-21 RX ORDER — ALPRAZOLAM 0.25 MG/1
0.25 TABLET ORAL NIGHTLY PRN
COMMUNITY

## 2018-12-21 NOTE — PROGRESS NOTES
weakness, light-headedness and numbness. Hematological: Does not bruise/bleed easily. Psychiatric/Behavioral: Positive for decreased concentration and dysphoric mood. Negative for agitation, behavioral problems and hallucinations. VITALS  /86   Pulse 76   Resp 16   Ht 6' 1\" (1.854 m)   Wt 202 lb (91.6 kg)   BMI 26.65 kg/m²      PHYSICAL EXAMINATION:     Constitutional: Well developed, well nourished and in no acute distress. Head:  normocephalic, atraumatic. Neck: supple, no carotid bruits, thyroid not palpable  Respiratory: Clear to auscultation bilaterally with no use of accessory muscles during respiration. Cardiovascular: normal rate, regular rhythm, no murmur, gallop, rub. Abdomen: Soft, nontender, nondistended, normal bowel sounds, no hepatomegaly or splenomegaly  Extremities:  peripheral pulses palpable, no pedal edema or calf pain with palpation  Psych: normal affect      NEUROLOGICAL EXAMINATION:     Mental Status:    Alert and oriented to person, place, and time. Recent and remote memory: Intact  Attention and concentration: Intact  Speech and language : Intact  Fund of knowledge: Intact  Judgement and Insight: Intact    Cranial Nerves:     II: Visual Acuity: OU 20/20 with correction       Visual Fields: Full to confrontation bilaterally   III, IV, VI: Pupils: equally round and reactive to light, 3  to 2  mm bilaterally. EOMs: full with no nystagmus. V: Sensation intact to light touch bilaterally  VII: symmetric  in the upper and lower face billaterally  VIII: Hearing intact to finger rub bilaterally   IX,X: Palate elevates symmetrically. XI: head turn (sternocleidomastoid) and shoulder shrug (trapezius) 5/5 bilaterally   XII: Tongue is midline upon protrusion    Motor:   Normal tone and bulk. Normal fine finger movements. No pronator drift. No resting tremors, postural tremors or myoclonus.     Upper Extremity Right Left  Lower Extremity Right Left

## 2018-12-28 ASSESSMENT — ENCOUNTER SYMPTOMS
CONSTIPATION: 0
DIARRHEA: 0
ABDOMINAL PAIN: 0
EYE PAIN: 0
PHOTOPHOBIA: 0
SHORTNESS OF BREATH: 0
EYE REDNESS: 0
VOMITING: 0
EYE DISCHARGE: 0
SORE THROAT: 0
COUGH: 0
NAUSEA: 0
SINUS PAIN: 0

## 2019-01-15 ENCOUNTER — HOSPITAL ENCOUNTER (INPATIENT)
Age: 44
LOS: 1 days | Discharge: HOME | DRG: 045 | End: 2019-01-17
Attending: EMERGENCY MEDICINE | Admitting: INTERNAL MEDICINE
Payer: COMMERCIAL

## 2019-01-15 ENCOUNTER — APPOINTMENT (OUTPATIENT)
Dept: CT IMAGING | Age: 44
DRG: 045 | End: 2019-01-15
Payer: COMMERCIAL

## 2019-01-15 DIAGNOSIS — I77.74 VERTEBRAL ARTERY DISSECTION (HCC): Primary | ICD-10-CM

## 2019-01-15 DIAGNOSIS — R51.9 CHRONIC INTRACTABLE HEADACHE, UNSPECIFIED HEADACHE TYPE: ICD-10-CM

## 2019-01-15 DIAGNOSIS — G89.29 CHRONIC INTRACTABLE HEADACHE, UNSPECIFIED HEADACHE TYPE: ICD-10-CM

## 2019-01-15 LAB
-: NORMAL
ABSOLUTE EOS #: 0.11 K/UL (ref 0–0.44)
ABSOLUTE IMMATURE GRANULOCYTE: <0.03 K/UL (ref 0–0.3)
ABSOLUTE LYMPH #: 1.98 K/UL (ref 1.1–3.7)
ABSOLUTE MONO #: 0.54 K/UL (ref 0.1–1.2)
ALBUMIN SERPL-MCNC: 4.4 G/DL (ref 3.5–5.2)
ALBUMIN/GLOBULIN RATIO: 1.4 (ref 1–2.5)
ALP BLD-CCNC: 85 U/L (ref 35–104)
ALT SERPL-CCNC: 9 U/L (ref 5–33)
ANION GAP SERPL CALCULATED.3IONS-SCNC: 16 MMOL/L (ref 9–17)
AST SERPL-CCNC: 19 U/L
BASOPHILS # BLD: 0 % (ref 0–2)
BASOPHILS ABSOLUTE: 0.03 K/UL (ref 0–0.2)
BILIRUB SERPL-MCNC: 0.22 MG/DL (ref 0.3–1.2)
BILIRUBIN DIRECT: <0.08 MG/DL
BILIRUBIN, INDIRECT: ABNORMAL MG/DL (ref 0–1)
BUN BLDV-MCNC: 9 MG/DL (ref 6–20)
BUN/CREAT BLD: ABNORMAL (ref 9–20)
C-REACTIVE PROTEIN: 3.3 MG/L (ref 0–5)
CALCIUM SERPL-MCNC: 9.6 MG/DL (ref 8.6–10.4)
CHLORIDE BLD-SCNC: 100 MMOL/L (ref 98–107)
CO2: 22 MMOL/L (ref 20–31)
CREAT SERPL-MCNC: 0.61 MG/DL (ref 0.5–0.9)
DIFFERENTIAL TYPE: NORMAL
EOSINOPHILS RELATIVE PERCENT: 2 % (ref 1–4)
GFR AFRICAN AMERICAN: >60 ML/MIN
GFR NON-AFRICAN AMERICAN: >60 ML/MIN
GFR SERPL CREATININE-BSD FRML MDRD: ABNORMAL ML/MIN/{1.73_M2}
GFR SERPL CREATININE-BSD FRML MDRD: ABNORMAL ML/MIN/{1.73_M2}
GLOBULIN: ABNORMAL G/DL (ref 1.5–3.8)
GLUCOSE BLD-MCNC: 138 MG/DL (ref 70–99)
HCG QUALITATIVE: NEGATIVE
HCT VFR BLD CALC: 44.6 % (ref 36.3–47.1)
HEMOGLOBIN: 13.5 G/DL (ref 11.9–15.1)
IMMATURE GRANULOCYTES: 0 %
INR BLD: 1.3
LYMPHOCYTES # BLD: 30 % (ref 24–43)
MCH RBC QN AUTO: 27.3 PG (ref 25.2–33.5)
MCHC RBC AUTO-ENTMCNC: 30.3 G/DL (ref 28.4–34.8)
MCV RBC AUTO: 90.1 FL (ref 82.6–102.9)
MONOCYTES # BLD: 8 % (ref 3–12)
NRBC AUTOMATED: 0 PER 100 WBC
PDW BLD-RTO: 12.6 % (ref 11.8–14.4)
PLATELET # BLD: 233 K/UL (ref 138–453)
PLATELET ESTIMATE: NORMAL
PMV BLD AUTO: 10.6 FL (ref 8.1–13.5)
POTASSIUM SERPL-SCNC: 4.4 MMOL/L (ref 3.7–5.3)
PROCALCITONIN: 0.05 NG/ML
PROTHROMBIN TIME: 14.1 SEC (ref 9–12)
RBC # BLD: 4.95 M/UL (ref 3.95–5.11)
RBC # BLD: NORMAL 10*6/UL
REASON FOR REJECTION: NORMAL
SEDIMENTATION RATE, ERYTHROCYTE: 8 MM (ref 0–20)
SEG NEUTROPHILS: 60 % (ref 36–65)
SEGMENTED NEUTROPHILS ABSOLUTE COUNT: 4.02 K/UL (ref 1.5–8.1)
SODIUM BLD-SCNC: 138 MMOL/L (ref 135–144)
TOTAL PROTEIN: 7.6 G/DL (ref 6.4–8.3)
WBC # BLD: 6.7 K/UL (ref 3.5–11.3)
WBC # BLD: NORMAL 10*3/UL
ZZ NTE CLEAN UP: ORDERED TEST: NORMAL
ZZ NTE WITH NAME CLEAN UP: SPECIMEN SOURCE: NORMAL

## 2019-01-15 PROCEDURE — 6360000004 HC RX CONTRAST MEDICATION: Performed by: STUDENT IN AN ORGANIZED HEALTH CARE EDUCATION/TRAINING PROGRAM

## 2019-01-15 PROCEDURE — 85610 PROTHROMBIN TIME: CPT

## 2019-01-15 PROCEDURE — 2580000003 HC RX 258: Performed by: EMERGENCY MEDICINE

## 2019-01-15 PROCEDURE — 86140 C-REACTIVE PROTEIN: CPT

## 2019-01-15 PROCEDURE — 96374 THER/PROPH/DIAG INJ IV PUSH: CPT

## 2019-01-15 PROCEDURE — 84703 CHORIONIC GONADOTROPIN ASSAY: CPT

## 2019-01-15 PROCEDURE — 85651 RBC SED RATE NONAUTOMATED: CPT

## 2019-01-15 PROCEDURE — 70450 CT HEAD/BRAIN W/O DYE: CPT

## 2019-01-15 PROCEDURE — 84145 PROCALCITONIN (PCT): CPT

## 2019-01-15 PROCEDURE — 85025 COMPLETE CBC W/AUTO DIFF WBC: CPT

## 2019-01-15 PROCEDURE — 70496 CT ANGIOGRAPHY HEAD: CPT

## 2019-01-15 PROCEDURE — 6370000000 HC RX 637 (ALT 250 FOR IP): Performed by: STUDENT IN AN ORGANIZED HEALTH CARE EDUCATION/TRAINING PROGRAM

## 2019-01-15 PROCEDURE — 6370000000 HC RX 637 (ALT 250 FOR IP): Performed by: EMERGENCY MEDICINE

## 2019-01-15 PROCEDURE — G0378 HOSPITAL OBSERVATION PER HR: HCPCS

## 2019-01-15 PROCEDURE — 80048 BASIC METABOLIC PNL TOTAL CA: CPT

## 2019-01-15 PROCEDURE — 6360000002 HC RX W HCPCS: Performed by: EMERGENCY MEDICINE

## 2019-01-15 PROCEDURE — 99285 EMERGENCY DEPT VISIT HI MDM: CPT

## 2019-01-15 PROCEDURE — 80076 HEPATIC FUNCTION PANEL: CPT

## 2019-01-15 PROCEDURE — 70498 CT ANGIOGRAPHY NECK: CPT

## 2019-01-15 PROCEDURE — 96375 TX/PRO/DX INJ NEW DRUG ADDON: CPT

## 2019-01-15 PROCEDURE — 6360000002 HC RX W HCPCS

## 2019-01-15 RX ORDER — CYCLOBENZAPRINE HCL 10 MG
10 TABLET ORAL ONCE
Status: COMPLETED | OUTPATIENT
Start: 2019-01-15 | End: 2019-01-15

## 2019-01-15 RX ORDER — 0.9 % SODIUM CHLORIDE 0.9 %
1000 INTRAVENOUS SOLUTION INTRAVENOUS ONCE
Status: COMPLETED | OUTPATIENT
Start: 2019-01-15 | End: 2019-01-15

## 2019-01-15 RX ORDER — DEXAMETHASONE SODIUM PHOSPHATE 10 MG/ML
10 INJECTION INTRAMUSCULAR; INTRAVENOUS ONCE
Status: COMPLETED | OUTPATIENT
Start: 2019-01-15 | End: 2019-01-15

## 2019-01-15 RX ORDER — MAGNESIUM SULFATE 1 G/100ML
INJECTION INTRAVENOUS
Status: COMPLETED
Start: 2019-01-15 | End: 2019-01-15

## 2019-01-15 RX ORDER — DIPHENHYDRAMINE HCL 25 MG
25 TABLET ORAL ONCE
Status: COMPLETED | OUTPATIENT
Start: 2019-01-15 | End: 2019-01-15

## 2019-01-15 RX ADMIN — MAGNESIUM SULFATE HEPTAHYDRATE 2 G: 1 INJECTION, SOLUTION INTRAVENOUS at 21:08

## 2019-01-15 RX ADMIN — SODIUM CHLORIDE 1000 ML: 9 INJECTION, SOLUTION INTRAVENOUS at 21:04

## 2019-01-15 RX ADMIN — PROCHLORPERAZINE EDISYLATE 10 MG: 5 INJECTION INTRAMUSCULAR; INTRAVENOUS at 21:05

## 2019-01-15 RX ADMIN — DIPHENHYDRAMINE HCL 25 MG: 25 TABLET ORAL at 21:13

## 2019-01-15 RX ADMIN — IOPAMIDOL 90 ML: 755 INJECTION, SOLUTION INTRAVENOUS at 21:58

## 2019-01-15 RX ADMIN — DEXAMETHASONE SODIUM PHOSPHATE 10 MG: 10 INJECTION INTRAMUSCULAR; INTRAVENOUS at 21:05

## 2019-01-15 RX ADMIN — CYCLOBENZAPRINE HYDROCHLORIDE 10 MG: 10 TABLET, FILM COATED ORAL at 21:36

## 2019-01-15 ASSESSMENT — PAIN DESCRIPTION - LOCATION: LOCATION: BACK;HEAD;NECK

## 2019-01-15 ASSESSMENT — PAIN DESCRIPTION - PAIN TYPE: TYPE: ACUTE PAIN

## 2019-01-15 ASSESSMENT — PAIN SCALES - GENERAL: PAINLEVEL_OUTOF10: 10

## 2019-01-16 ENCOUNTER — APPOINTMENT (OUTPATIENT)
Dept: MRI IMAGING | Age: 44
DRG: 045 | End: 2019-01-16
Payer: COMMERCIAL

## 2019-01-16 PROBLEM — I77.74 VERTEBRAL ARTERY DISSECTION (HCC): Status: ACTIVE | Noted: 2019-01-16

## 2019-01-16 PROBLEM — I63.29 ACUTE ISCHEMIC VERTEBROBASILAR ARTERY CEREBELLAR STROKE (HCC): Status: ACTIVE | Noted: 2019-01-16

## 2019-01-16 LAB
-: NORMAL
ABSOLUTE EOS #: <0.03 K/UL (ref 0–0.44)
ABSOLUTE IMMATURE GRANULOCYTE: <0.03 K/UL (ref 0–0.3)
ABSOLUTE LYMPH #: 1.21 K/UL (ref 1.1–3.7)
ABSOLUTE MONO #: 0.22 K/UL (ref 0.1–1.2)
ANION GAP SERPL CALCULATED.3IONS-SCNC: 12 MMOL/L (ref 9–17)
BASOPHILS # BLD: 0 % (ref 0–2)
BASOPHILS ABSOLUTE: <0.03 K/UL (ref 0–0.2)
BUN BLDV-MCNC: 8 MG/DL (ref 6–20)
BUN/CREAT BLD: ABNORMAL (ref 9–20)
CALCIUM SERPL-MCNC: 8.9 MG/DL (ref 8.6–10.4)
CHLORIDE BLD-SCNC: 104 MMOL/L (ref 98–107)
CO2: 22 MMOL/L (ref 20–31)
CREAT SERPL-MCNC: 0.51 MG/DL (ref 0.5–0.9)
DIFFERENTIAL TYPE: ABNORMAL
EOSINOPHILS RELATIVE PERCENT: 0 % (ref 1–4)
GFR AFRICAN AMERICAN: >60 ML/MIN
GFR NON-AFRICAN AMERICAN: >60 ML/MIN
GFR SERPL CREATININE-BSD FRML MDRD: ABNORMAL ML/MIN/{1.73_M2}
GFR SERPL CREATININE-BSD FRML MDRD: ABNORMAL ML/MIN/{1.73_M2}
GLUCOSE BLD-MCNC: 127 MG/DL (ref 65–105)
GLUCOSE BLD-MCNC: 233 MG/DL (ref 65–105)
GLUCOSE BLD-MCNC: 239 MG/DL (ref 65–105)
GLUCOSE BLD-MCNC: 242 MG/DL (ref 70–99)
GLUCOSE BLD-MCNC: 270 MG/DL (ref 65–105)
HCT VFR BLD CALC: 43.3 % (ref 36.3–47.1)
HEMOGLOBIN: 13.5 G/DL (ref 11.9–15.1)
IMMATURE GRANULOCYTES: 0 %
LYMPHOCYTES # BLD: 19 % (ref 24–43)
MCH RBC QN AUTO: 27.1 PG (ref 25.2–33.5)
MCHC RBC AUTO-ENTMCNC: 31.2 G/DL (ref 28.4–34.8)
MCV RBC AUTO: 86.8 FL (ref 82.6–102.9)
MONOCYTES # BLD: 4 % (ref 3–12)
MRSA, DNA, NASAL: NORMAL
NRBC AUTOMATED: 0 PER 100 WBC
PDW BLD-RTO: 12.8 % (ref 11.8–14.4)
PLATELET # BLD: 251 K/UL (ref 138–453)
PLATELET ESTIMATE: ABNORMAL
PMV BLD AUTO: 10.5 FL (ref 8.1–13.5)
POTASSIUM SERPL-SCNC: 4.3 MMOL/L (ref 3.7–5.3)
RBC # BLD: 4.99 M/UL (ref 3.95–5.11)
RBC # BLD: ABNORMAL 10*6/UL
REASON FOR REJECTION: NORMAL
SEG NEUTROPHILS: 77 % (ref 36–65)
SEGMENTED NEUTROPHILS ABSOLUTE COUNT: 4.78 K/UL (ref 1.5–8.1)
SODIUM BLD-SCNC: 138 MMOL/L (ref 135–144)
SPECIMEN DESCRIPTION: NORMAL
WBC # BLD: 6.3 K/UL (ref 3.5–11.3)
WBC # BLD: ABNORMAL 10*3/UL
ZZ NTE CLEAN UP: ORDERED TEST: NORMAL
ZZ NTE WITH NAME CLEAN UP: SPECIMEN SOURCE: NORMAL

## 2019-01-16 PROCEDURE — 85025 COMPLETE CBC W/AUTO DIFF WBC: CPT

## 2019-01-16 PROCEDURE — 87641 MR-STAPH DNA AMP PROBE: CPT

## 2019-01-16 PROCEDURE — 97161 PT EVAL LOW COMPLEX 20 MIN: CPT

## 2019-01-16 PROCEDURE — 97530 THERAPEUTIC ACTIVITIES: CPT

## 2019-01-16 PROCEDURE — 97165 OT EVAL LOW COMPLEX 30 MIN: CPT

## 2019-01-16 PROCEDURE — 99223 1ST HOSP IP/OBS HIGH 75: CPT | Performed by: INTERNAL MEDICINE

## 2019-01-16 PROCEDURE — 6370000000 HC RX 637 (ALT 250 FOR IP): Performed by: STUDENT IN AN ORGANIZED HEALTH CARE EDUCATION/TRAINING PROGRAM

## 2019-01-16 PROCEDURE — 6360000004 HC RX CONTRAST MEDICATION: Performed by: NURSE PRACTITIONER

## 2019-01-16 PROCEDURE — 94762 N-INVAS EAR/PLS OXIMTRY CONT: CPT

## 2019-01-16 PROCEDURE — G0378 HOSPITAL OBSERVATION PER HR: HCPCS

## 2019-01-16 PROCEDURE — 6370000000 HC RX 637 (ALT 250 FOR IP): Performed by: HOSPITALIST

## 2019-01-16 PROCEDURE — 83036 HEMOGLOBIN GLYCOSYLATED A1C: CPT

## 2019-01-16 PROCEDURE — 70551 MRI BRAIN STEM W/O DYE: CPT

## 2019-01-16 PROCEDURE — 2060000000 HC ICU INTERMEDIATE R&B

## 2019-01-16 PROCEDURE — 2580000003 HC RX 258: Performed by: STUDENT IN AN ORGANIZED HEALTH CARE EDUCATION/TRAINING PROGRAM

## 2019-01-16 PROCEDURE — 70552 MRI BRAIN STEM W/DYE: CPT

## 2019-01-16 PROCEDURE — 82947 ASSAY GLUCOSE BLOOD QUANT: CPT

## 2019-01-16 PROCEDURE — A9579 GAD-BASE MR CONTRAST NOS,1ML: HCPCS | Performed by: NURSE PRACTITIONER

## 2019-01-16 PROCEDURE — 36415 COLL VENOUS BLD VENIPUNCTURE: CPT

## 2019-01-16 PROCEDURE — 99254 IP/OBS CNSLTJ NEW/EST MOD 60: CPT | Performed by: NURSE PRACTITIONER

## 2019-01-16 PROCEDURE — 80048 BASIC METABOLIC PNL TOTAL CA: CPT

## 2019-01-16 RX ORDER — ACETAMINOPHEN 325 MG/1
650 TABLET ORAL EVERY 4 HOURS PRN
Status: CANCELLED | OUTPATIENT
Start: 2019-01-16

## 2019-01-16 RX ORDER — ALPRAZOLAM 0.25 MG/1
0.25 TABLET ORAL NIGHTLY PRN
Status: DISCONTINUED | OUTPATIENT
Start: 2019-01-16 | End: 2019-01-16

## 2019-01-16 RX ORDER — HEPARIN SODIUM 10000 [USP'U]/100ML
12 INJECTION, SOLUTION INTRAVENOUS CONTINUOUS
Status: DISCONTINUED | OUTPATIENT
Start: 2019-01-16 | End: 2019-01-16

## 2019-01-16 RX ORDER — DIPHENHYDRAMINE HCL 50 MG
50 CAPSULE ORAL NIGHTLY PRN
Status: DISCONTINUED | OUTPATIENT
Start: 2019-01-16 | End: 2019-01-17 | Stop reason: HOSPADM

## 2019-01-16 RX ORDER — SODIUM CHLORIDE 0.9 % (FLUSH) 0.9 %
10 SYRINGE (ML) INJECTION PRN
Status: DISCONTINUED | OUTPATIENT
Start: 2019-01-16 | End: 2019-01-17 | Stop reason: HOSPADM

## 2019-01-16 RX ORDER — SODIUM CHLORIDE 0.9 % (FLUSH) 0.9 %
10 SYRINGE (ML) INJECTION EVERY 12 HOURS SCHEDULED
Status: DISCONTINUED | OUTPATIENT
Start: 2019-01-16 | End: 2019-01-17 | Stop reason: HOSPADM

## 2019-01-16 RX ORDER — ALBUTEROL SULFATE 90 UG/1
2 AEROSOL, METERED RESPIRATORY (INHALATION) EVERY 6 HOURS PRN
Status: DISCONTINUED | OUTPATIENT
Start: 2019-01-16 | End: 2019-01-17 | Stop reason: HOSPADM

## 2019-01-16 RX ORDER — SODIUM CHLORIDE 0.9 % (FLUSH) 0.9 %
10 SYRINGE (ML) INJECTION PRN
Status: DISCONTINUED | OUTPATIENT
Start: 2019-01-16 | End: 2019-01-17 | Stop reason: SDUPTHER

## 2019-01-16 RX ORDER — ONDANSETRON 2 MG/ML
4 INJECTION INTRAMUSCULAR; INTRAVENOUS EVERY 6 HOURS PRN
Status: DISCONTINUED | OUTPATIENT
Start: 2019-01-16 | End: 2019-01-17 | Stop reason: HOSPADM

## 2019-01-16 RX ORDER — BUTALBITAL, ACETAMINOPHEN AND CAFFEINE 50; 325; 40 MG/1; MG/1; MG/1
2 TABLET ORAL ONCE
Status: COMPLETED | OUTPATIENT
Start: 2019-01-16 | End: 2019-01-16

## 2019-01-16 RX ORDER — ACETAMINOPHEN 325 MG/1
650 TABLET ORAL EVERY 4 HOURS PRN
Status: DISCONTINUED | OUTPATIENT
Start: 2019-01-16 | End: 2019-01-17 | Stop reason: HOSPADM

## 2019-01-16 RX ORDER — SODIUM CHLORIDE 9 MG/ML
INJECTION, SOLUTION INTRAVENOUS CONTINUOUS
Status: DISCONTINUED | OUTPATIENT
Start: 2019-01-16 | End: 2019-01-17 | Stop reason: HOSPADM

## 2019-01-16 RX ORDER — ATORVASTATIN CALCIUM 40 MG/1
40 TABLET, FILM COATED ORAL NIGHTLY
Status: DISCONTINUED | OUTPATIENT
Start: 2019-01-16 | End: 2019-01-17 | Stop reason: HOSPADM

## 2019-01-16 RX ORDER — FAMOTIDINE 20 MG/1
20 TABLET, FILM COATED ORAL 2 TIMES DAILY
Status: DISCONTINUED | OUTPATIENT
Start: 2019-01-16 | End: 2019-01-17 | Stop reason: HOSPADM

## 2019-01-16 RX ORDER — SODIUM CHLORIDE 0.9 % (FLUSH) 0.9 %
10 SYRINGE (ML) INJECTION EVERY 12 HOURS SCHEDULED
Status: DISCONTINUED | OUTPATIENT
Start: 2019-01-16 | End: 2019-01-17 | Stop reason: SDUPTHER

## 2019-01-16 RX ADMIN — ASPIRIN 325 MG: 325 TABLET, COATED ORAL at 09:23

## 2019-01-16 RX ADMIN — SERTRALINE 200 MG: 50 TABLET, FILM COATED ORAL at 21:02

## 2019-01-16 RX ADMIN — GADOTERIDOL 19 ML: 279.3 INJECTION, SOLUTION INTRAVENOUS at 17:57

## 2019-01-16 RX ADMIN — BUTALBITAL, ACETAMINOPHEN, AND CAFFEINE 2 TABLET: 50; 325; 40 TABLET ORAL at 01:44

## 2019-01-16 RX ADMIN — SODIUM CHLORIDE: 9 INJECTION, SOLUTION INTRAVENOUS at 21:00

## 2019-01-16 RX ADMIN — FAMOTIDINE 20 MG: 20 TABLET, FILM COATED ORAL at 22:43

## 2019-01-16 RX ADMIN — LEVETIRACETAM 750 MG: 500 TABLET, FILM COATED ORAL at 21:00

## 2019-01-16 RX ADMIN — Medication 10 ML: at 21:01

## 2019-01-16 RX ADMIN — DESMOPRESSIN ACETATE 40 MG: 0.2 TABLET ORAL at 21:00

## 2019-01-16 RX ADMIN — SODIUM CHLORIDE: 9 INJECTION, SOLUTION INTRAVENOUS at 03:23

## 2019-01-16 RX ADMIN — LEVETIRACETAM 750 MG: 500 TABLET, FILM COATED ORAL at 09:22

## 2019-01-16 RX ADMIN — FAMOTIDINE 20 MG: 20 TABLET, FILM COATED ORAL at 09:23

## 2019-01-16 ASSESSMENT — ENCOUNTER SYMPTOMS
ABDOMINAL PAIN: 0
PHOTOPHOBIA: 1
COUGH: 0
NAUSEA: 1
SHORTNESS OF BREATH: 0
VOMITING: 1
SORE THROAT: 0
DIARRHEA: 0

## 2019-01-16 ASSESSMENT — PAIN SCALES - GENERAL
PAINLEVEL_OUTOF10: 2
PAINLEVEL_OUTOF10: 5
PAINLEVEL_OUTOF10: 7
PAINLEVEL_OUTOF10: 0
PAINLEVEL_OUTOF10: 0

## 2019-01-16 ASSESSMENT — PAIN DESCRIPTION - PAIN TYPE: TYPE: ACUTE PAIN

## 2019-01-16 ASSESSMENT — PAIN DESCRIPTION - LOCATION: LOCATION: NECK

## 2019-01-17 VITALS
DIASTOLIC BLOOD PRESSURE: 74 MMHG | HEIGHT: 72 IN | SYSTOLIC BLOOD PRESSURE: 125 MMHG | HEART RATE: 65 BPM | TEMPERATURE: 98.9 F | OXYGEN SATURATION: 98 % | BODY MASS INDEX: 30.04 KG/M2 | WEIGHT: 221.78 LBS | RESPIRATION RATE: 19 BRPM

## 2019-01-17 LAB
ABSOLUTE EOS #: 0.1 K/UL (ref 0–0.44)
ABSOLUTE IMMATURE GRANULOCYTE: <0.03 K/UL (ref 0–0.3)
ABSOLUTE LYMPH #: 3.23 K/UL (ref 1.1–3.7)
ABSOLUTE MONO #: 0.53 K/UL (ref 0.1–1.2)
ANION GAP SERPL CALCULATED.3IONS-SCNC: 9 MMOL/L (ref 9–17)
BASOPHILS # BLD: 1 % (ref 0–2)
BASOPHILS ABSOLUTE: 0.04 K/UL (ref 0–0.2)
BUN BLDV-MCNC: 16 MG/DL (ref 6–20)
BUN/CREAT BLD: ABNORMAL (ref 9–20)
CALCIUM SERPL-MCNC: 8.2 MG/DL (ref 8.6–10.4)
CHLORIDE BLD-SCNC: 108 MMOL/L (ref 98–107)
CHOLESTEROL/HDL RATIO: 3.5
CHOLESTEROL: 145 MG/DL
CO2: 22 MMOL/L (ref 20–31)
CREAT SERPL-MCNC: 0.64 MG/DL (ref 0.5–0.9)
DIFFERENTIAL TYPE: NORMAL
EOSINOPHILS RELATIVE PERCENT: 1 % (ref 1–4)
ESTIMATED AVERAGE GLUCOSE: 258 MG/DL
GFR AFRICAN AMERICAN: >60 ML/MIN
GFR NON-AFRICAN AMERICAN: >60 ML/MIN
GFR SERPL CREATININE-BSD FRML MDRD: ABNORMAL ML/MIN/{1.73_M2}
GFR SERPL CREATININE-BSD FRML MDRD: ABNORMAL ML/MIN/{1.73_M2}
GLUCOSE BLD-MCNC: 302 MG/DL (ref 65–105)
GLUCOSE BLD-MCNC: 308 MG/DL (ref 65–105)
GLUCOSE BLD-MCNC: 410 MG/DL (ref 70–99)
HBA1C MFR BLD: 10.6 % (ref 4–6)
HCT VFR BLD CALC: 37.1 % (ref 36.3–47.1)
HDLC SERPL-MCNC: 41 MG/DL
HEMOGLOBIN: 12 G/DL (ref 11.9–15.1)
IMMATURE GRANULOCYTES: 0 %
LDL CHOLESTEROL: 74 MG/DL (ref 0–130)
LV EF: 55 %
LVEF MODALITY: NORMAL
LYMPHOCYTES # BLD: 43 % (ref 24–43)
MCH RBC QN AUTO: 28 PG (ref 25.2–33.5)
MCHC RBC AUTO-ENTMCNC: 32.3 G/DL (ref 28.4–34.8)
MCV RBC AUTO: 86.7 FL (ref 82.6–102.9)
MONOCYTES # BLD: 7 % (ref 3–12)
NRBC AUTOMATED: 0 PER 100 WBC
PDW BLD-RTO: 12.9 % (ref 11.8–14.4)
PLATELET # BLD: 210 K/UL (ref 138–453)
PLATELET ESTIMATE: NORMAL
PMV BLD AUTO: 10.8 FL (ref 8.1–13.5)
POTASSIUM SERPL-SCNC: 4.1 MMOL/L (ref 3.7–5.3)
RBC # BLD: 4.28 M/UL (ref 3.95–5.11)
RBC # BLD: NORMAL 10*6/UL
SEG NEUTROPHILS: 48 % (ref 36–65)
SEGMENTED NEUTROPHILS ABSOLUTE COUNT: 3.62 K/UL (ref 1.5–8.1)
SODIUM BLD-SCNC: 139 MMOL/L (ref 135–144)
TRIGL SERPL-MCNC: 148 MG/DL
VLDLC SERPL CALC-MCNC: NORMAL MG/DL (ref 1–30)
WBC # BLD: 7.5 K/UL (ref 3.5–11.3)
WBC # BLD: NORMAL 10*3/UL

## 2019-01-17 PROCEDURE — 93306 TTE W/DOPPLER COMPLETE: CPT

## 2019-01-17 PROCEDURE — 85025 COMPLETE CBC W/AUTO DIFF WBC: CPT

## 2019-01-17 PROCEDURE — G0378 HOSPITAL OBSERVATION PER HR: HCPCS

## 2019-01-17 PROCEDURE — 80048 BASIC METABOLIC PNL TOTAL CA: CPT

## 2019-01-17 PROCEDURE — 94762 N-INVAS EAR/PLS OXIMTRY CONT: CPT

## 2019-01-17 PROCEDURE — 2580000003 HC RX 258: Performed by: STUDENT IN AN ORGANIZED HEALTH CARE EDUCATION/TRAINING PROGRAM

## 2019-01-17 PROCEDURE — 99254 IP/OBS CNSLTJ NEW/EST MOD 60: CPT | Performed by: PSYCHIATRY & NEUROLOGY

## 2019-01-17 PROCEDURE — 80061 LIPID PANEL: CPT

## 2019-01-17 PROCEDURE — 6370000000 HC RX 637 (ALT 250 FOR IP): Performed by: STUDENT IN AN ORGANIZED HEALTH CARE EDUCATION/TRAINING PROGRAM

## 2019-01-17 PROCEDURE — 36415 COLL VENOUS BLD VENIPUNCTURE: CPT

## 2019-01-17 PROCEDURE — 99238 HOSP IP/OBS DSCHRG MGMT 30/<: CPT | Performed by: INTERNAL MEDICINE

## 2019-01-17 PROCEDURE — 82947 ASSAY GLUCOSE BLOOD QUANT: CPT

## 2019-01-17 PROCEDURE — 99233 SBSQ HOSP IP/OBS HIGH 50: CPT | Performed by: NURSE PRACTITIONER

## 2019-01-17 RX ORDER — KETOROLAC TROMETHAMINE 30 MG/ML
30 INJECTION, SOLUTION INTRAMUSCULAR; INTRAVENOUS EVERY 6 HOURS PRN
Status: DISCONTINUED | OUTPATIENT
Start: 2019-01-17 | End: 2019-01-17 | Stop reason: HOSPADM

## 2019-01-17 RX ORDER — CHLORHEXIDINE GLUCONATE 0.12 MG/ML
15 RINSE ORAL 2 TIMES DAILY
Status: DISCONTINUED | OUTPATIENT
Start: 2019-01-17 | End: 2019-01-17 | Stop reason: HOSPADM

## 2019-01-17 RX ORDER — FAMOTIDINE 20 MG/1
20 TABLET, FILM COATED ORAL 2 TIMES DAILY
Qty: 60 TABLET | Refills: 3 | Status: SHIPPED | OUTPATIENT
Start: 2019-01-17 | End: 2021-02-03

## 2019-01-17 RX ORDER — ATORVASTATIN CALCIUM 40 MG/1
40 TABLET, FILM COATED ORAL NIGHTLY
Qty: 30 TABLET | Refills: 3 | Status: SHIPPED | OUTPATIENT
Start: 2019-01-17 | End: 2022-02-03

## 2019-01-17 RX ORDER — KETOROLAC TROMETHAMINE 30 MG/ML
30 INJECTION, SOLUTION INTRAMUSCULAR; INTRAVENOUS ONCE
Status: DISCONTINUED | OUTPATIENT
Start: 2019-01-17 | End: 2019-01-17 | Stop reason: HOSPADM

## 2019-01-17 RX ADMIN — Medication 10 ML: at 10:00

## 2019-01-17 RX ADMIN — LEVETIRACETAM 750 MG: 500 TABLET, FILM COATED ORAL at 09:59

## 2019-01-17 RX ADMIN — FAMOTIDINE 20 MG: 20 TABLET, FILM COATED ORAL at 09:58

## 2019-01-17 RX ADMIN — ASPIRIN 325 MG: 325 TABLET, COATED ORAL at 11:15

## 2019-01-17 ASSESSMENT — PAIN SCALES - GENERAL: PAINLEVEL_OUTOF10: 3

## 2019-01-21 ENCOUNTER — APPOINTMENT (OUTPATIENT)
Dept: VASCULAR LAB | Age: 44
End: 2019-01-21
Payer: COMMERCIAL

## 2019-01-21 ENCOUNTER — HOSPITAL ENCOUNTER (EMERGENCY)
Age: 44
Discharge: HOME | End: 2019-01-21
Attending: EMERGENCY MEDICINE
Payer: COMMERCIAL

## 2019-01-21 ENCOUNTER — APPOINTMENT (OUTPATIENT)
Dept: GENERAL RADIOLOGY | Age: 44
End: 2019-01-21
Payer: COMMERCIAL

## 2019-01-21 VITALS
DIASTOLIC BLOOD PRESSURE: 90 MMHG | HEIGHT: 72 IN | HEART RATE: 67 BPM | OXYGEN SATURATION: 99 % | TEMPERATURE: 98.8 F | WEIGHT: 221.78 LBS | BODY MASS INDEX: 30.04 KG/M2 | RESPIRATION RATE: 18 BRPM | SYSTOLIC BLOOD PRESSURE: 140 MMHG

## 2019-01-21 DIAGNOSIS — M25.511 ACUTE PAIN OF RIGHT SHOULDER: ICD-10-CM

## 2019-01-21 DIAGNOSIS — I80.8 SUPERFICIAL PHLEBITIS OF ARM: Primary | ICD-10-CM

## 2019-01-21 LAB
ABSOLUTE EOS #: 0.05 K/UL (ref 0–0.44)
ABSOLUTE IMMATURE GRANULOCYTE: 0.03 K/UL (ref 0–0.3)
ABSOLUTE LYMPH #: 1.68 K/UL (ref 1.1–3.7)
ABSOLUTE MONO #: 0.46 K/UL (ref 0.1–1.2)
ANION GAP SERPL CALCULATED.3IONS-SCNC: 11 MMOL/L (ref 9–17)
BASOPHILS # BLD: 0 % (ref 0–2)
BASOPHILS ABSOLUTE: 0.04 K/UL (ref 0–0.2)
BUN BLDV-MCNC: 12 MG/DL (ref 6–20)
BUN/CREAT BLD: 17 (ref 9–20)
CALCIUM SERPL-MCNC: 9.4 MG/DL (ref 8.6–10.4)
CHLORIDE BLD-SCNC: 99 MMOL/L (ref 98–107)
CO2: 27 MMOL/L (ref 20–31)
CREAT SERPL-MCNC: 0.69 MG/DL (ref 0.5–0.9)
D-DIMER QUANTITATIVE: 0.48 MG/L FEU (ref 0.19–0.5)
DIFFERENTIAL TYPE: ABNORMAL
EKG ATRIAL RATE: 67 BPM
EKG P AXIS: 24 DEGREES
EKG P-R INTERVAL: 142 MS
EKG Q-T INTERVAL: 408 MS
EKG QRS DURATION: 88 MS
EKG QTC CALCULATION (BAZETT): 431 MS
EKG R AXIS: 75 DEGREES
EKG T AXIS: 31 DEGREES
EKG VENTRICULAR RATE: 67 BPM
EOSINOPHILS RELATIVE PERCENT: 1 % (ref 1–4)
GFR AFRICAN AMERICAN: >60 ML/MIN
GFR NON-AFRICAN AMERICAN: >60 ML/MIN
GFR SERPL CREATININE-BSD FRML MDRD: ABNORMAL ML/MIN/{1.73_M2}
GFR SERPL CREATININE-BSD FRML MDRD: ABNORMAL ML/MIN/{1.73_M2}
GLUCOSE BLD-MCNC: 323 MG/DL (ref 70–99)
HCT VFR BLD CALC: 42.7 % (ref 36.3–47.1)
HEMOGLOBIN: 13.8 G/DL (ref 11.9–15.1)
IMMATURE GRANULOCYTES: 0 %
LYMPHOCYTES # BLD: 17 % (ref 24–43)
MCH RBC QN AUTO: 27.8 PG (ref 25.2–33.5)
MCHC RBC AUTO-ENTMCNC: 32.3 G/DL (ref 28.4–34.8)
MCV RBC AUTO: 85.9 FL (ref 82.6–102.9)
MONOCYTES # BLD: 5 % (ref 3–12)
NRBC AUTOMATED: 0 PER 100 WBC
PDW BLD-RTO: 12.7 % (ref 11.8–14.4)
PLATELET # BLD: 259 K/UL (ref 138–453)
PLATELET ESTIMATE: ABNORMAL
PMV BLD AUTO: 10.3 FL (ref 8.1–13.5)
POTASSIUM SERPL-SCNC: 4.5 MMOL/L (ref 3.7–5.3)
RBC # BLD: 4.97 M/UL (ref 3.95–5.11)
RBC # BLD: ABNORMAL 10*6/UL
SEG NEUTROPHILS: 77 % (ref 36–65)
SEGMENTED NEUTROPHILS ABSOLUTE COUNT: 7.92 K/UL (ref 1.5–8.1)
SODIUM BLD-SCNC: 137 MMOL/L (ref 135–144)
TROPONIN INTERP: NORMAL
TROPONIN T: <0.03 NG/ML
TROPONIN, HIGH SENSITIVITY: NORMAL NG/L (ref 0–14)
WBC # BLD: 10.2 K/UL (ref 3.5–11.3)
WBC # BLD: ABNORMAL 10*3/UL

## 2019-01-21 PROCEDURE — 85379 FIBRIN DEGRADATION QUANT: CPT

## 2019-01-21 PROCEDURE — 6370000000 HC RX 637 (ALT 250 FOR IP): Performed by: EMERGENCY MEDICINE

## 2019-01-21 PROCEDURE — 71046 X-RAY EXAM CHEST 2 VIEWS: CPT

## 2019-01-21 PROCEDURE — 93971 EXTREMITY STUDY: CPT

## 2019-01-21 PROCEDURE — 99284 EMERGENCY DEPT VISIT MOD MDM: CPT

## 2019-01-21 PROCEDURE — 93005 ELECTROCARDIOGRAM TRACING: CPT

## 2019-01-21 PROCEDURE — 85025 COMPLETE CBC W/AUTO DIFF WBC: CPT

## 2019-01-21 PROCEDURE — 36415 COLL VENOUS BLD VENIPUNCTURE: CPT

## 2019-01-21 PROCEDURE — 80048 BASIC METABOLIC PNL TOTAL CA: CPT

## 2019-01-21 PROCEDURE — 84484 ASSAY OF TROPONIN QUANT: CPT

## 2019-01-21 RX ORDER — NAPROXEN 375 MG/1
375 TABLET ORAL 2 TIMES DAILY
Qty: 30 TABLET | Refills: 0 | Status: ON HOLD | OUTPATIENT
Start: 2019-01-21 | End: 2019-04-23 | Stop reason: HOSPADM

## 2019-01-21 RX ORDER — NAPROXEN 250 MG/1
375 TABLET ORAL ONCE
Status: COMPLETED | OUTPATIENT
Start: 2019-01-21 | End: 2019-01-21

## 2019-01-21 RX ADMIN — NAPROXEN 375 MG: 250 TABLET ORAL at 14:25

## 2019-01-21 ASSESSMENT — PAIN SCALES - GENERAL: PAINLEVEL_OUTOF10: 4

## 2019-01-21 ASSESSMENT — PAIN DESCRIPTION - ORIENTATION
ORIENTATION_2: RIGHT
ORIENTATION: LEFT

## 2019-01-21 ASSESSMENT — PAIN DESCRIPTION - INTENSITY: RATING_2: 6

## 2019-01-21 ASSESSMENT — PAIN DESCRIPTION - LOCATION
LOCATION_2: ARM
LOCATION: ARM

## 2019-01-21 ASSESSMENT — PAIN DESCRIPTION - PAIN TYPE: TYPE: ACUTE PAIN

## 2019-02-08 ENCOUNTER — OFFICE VISIT (OUTPATIENT)
Dept: NEUROLOGY | Age: 44
End: 2019-02-08
Payer: COMMERCIAL

## 2019-02-08 ENCOUNTER — TELEPHONE (OUTPATIENT)
Dept: NEUROLOGY | Age: 44
End: 2019-02-08

## 2019-02-08 VITALS
WEIGHT: 195 LBS | SYSTOLIC BLOOD PRESSURE: 138 MMHG | BODY MASS INDEX: 26.41 KG/M2 | HEART RATE: 84 BPM | RESPIRATION RATE: 16 BRPM | DIASTOLIC BLOOD PRESSURE: 78 MMHG | HEIGHT: 72 IN

## 2019-02-08 DIAGNOSIS — I77.74 VERTEBRAL ARTERY DISSECTION (HCC): ICD-10-CM

## 2019-02-08 DIAGNOSIS — I63.29 ACUTE ISCHEMIC VERTEBROBASILAR ARTERY CEREBELLAR STROKE (HCC): ICD-10-CM

## 2019-02-08 DIAGNOSIS — G40.109 LOCALIZATION-RELATED EPILEPSY (HCC): Primary | ICD-10-CM

## 2019-02-08 DIAGNOSIS — G89.29 CHRONIC NONINTRACTABLE HEADACHE, UNSPECIFIED HEADACHE TYPE: ICD-10-CM

## 2019-02-08 DIAGNOSIS — R51.9 CHRONIC NONINTRACTABLE HEADACHE, UNSPECIFIED HEADACHE TYPE: ICD-10-CM

## 2019-02-08 PROCEDURE — G8419 CALC BMI OUT NRM PARAM NOF/U: HCPCS | Performed by: STUDENT IN AN ORGANIZED HEALTH CARE EDUCATION/TRAINING PROGRAM

## 2019-02-08 PROCEDURE — 99214 OFFICE O/P EST MOD 30 MIN: CPT | Performed by: STUDENT IN AN ORGANIZED HEALTH CARE EDUCATION/TRAINING PROGRAM

## 2019-02-08 PROCEDURE — G8484 FLU IMMUNIZE NO ADMIN: HCPCS | Performed by: STUDENT IN AN ORGANIZED HEALTH CARE EDUCATION/TRAINING PROGRAM

## 2019-02-08 PROCEDURE — 4004F PT TOBACCO SCREEN RCVD TLK: CPT | Performed by: STUDENT IN AN ORGANIZED HEALTH CARE EDUCATION/TRAINING PROGRAM

## 2019-02-08 PROCEDURE — G8598 ASA/ANTIPLAT THER USED: HCPCS | Performed by: STUDENT IN AN ORGANIZED HEALTH CARE EDUCATION/TRAINING PROGRAM

## 2019-02-08 PROCEDURE — G8427 DOCREV CUR MEDS BY ELIG CLIN: HCPCS | Performed by: STUDENT IN AN ORGANIZED HEALTH CARE EDUCATION/TRAINING PROGRAM

## 2019-02-08 RX ORDER — LEVETIRACETAM 250 MG/1
750 TABLET, FILM COATED ORAL 2 TIMES DAILY
Qty: 180 TABLET | Refills: 3 | Status: SHIPPED | OUTPATIENT
Start: 2019-02-08 | End: 2019-09-02 | Stop reason: SDUPTHER

## 2019-02-08 ASSESSMENT — ENCOUNTER SYMPTOMS
EYE DISCHARGE: 0
SINUS PAIN: 0
EYE REDNESS: 0
EYE PAIN: 0
SORE THROAT: 0
PHOTOPHOBIA: 0
CONSTIPATION: 0
NAUSEA: 0
VOMITING: 0
SHORTNESS OF BREATH: 0
DIARRHEA: 0
COUGH: 0
ABDOMINAL PAIN: 0

## 2019-02-11 ENCOUNTER — PROCEDURE VISIT (OUTPATIENT)
Dept: NEUROLOGY | Age: 44
End: 2019-02-11

## 2019-02-11 DIAGNOSIS — Z87.898 HISTORY OF SEIZURE: ICD-10-CM

## 2019-04-20 ENCOUNTER — HOSPITAL ENCOUNTER (EMERGENCY)
Age: 44
Discharge: ANOTHER HOSPITAL | End: 2019-04-20
Attending: EMERGENCY MEDICINE
Payer: COMMERCIAL

## 2019-04-20 ENCOUNTER — APPOINTMENT (OUTPATIENT)
Dept: CT IMAGING | Age: 44
End: 2019-04-20
Payer: COMMERCIAL

## 2019-04-20 ENCOUNTER — HOSPITAL ENCOUNTER (INPATIENT)
Age: 44
LOS: 3 days | Discharge: HOME | DRG: 058 | End: 2019-04-23
Attending: EMERGENCY MEDICINE | Admitting: INTERNAL MEDICINE
Payer: COMMERCIAL

## 2019-04-20 VITALS
RESPIRATION RATE: 18 BRPM | DIASTOLIC BLOOD PRESSURE: 87 MMHG | TEMPERATURE: 97 F | HEART RATE: 82 BPM | SYSTOLIC BLOOD PRESSURE: 132 MMHG | OXYGEN SATURATION: 98 %

## 2019-04-20 DIAGNOSIS — R20.0 NUMBNESS: ICD-10-CM

## 2019-04-20 DIAGNOSIS — R51.9 NONINTRACTABLE HEADACHE, UNSPECIFIED CHRONICITY PATTERN, UNSPECIFIED HEADACHE TYPE: Primary | ICD-10-CM

## 2019-04-20 DIAGNOSIS — M54.2 NECK PAIN: Primary | ICD-10-CM

## 2019-04-20 LAB
ABSOLUTE EOS #: 0.1 K/UL (ref 0–0.44)
ABSOLUTE IMMATURE GRANULOCYTE: <0.03 K/UL (ref 0–0.3)
ABSOLUTE LYMPH #: 2.96 K/UL (ref 1.1–3.7)
ABSOLUTE MONO #: 0.59 K/UL (ref 0.1–1.2)
ANION GAP SERPL CALCULATED.3IONS-SCNC: 12 MMOL/L (ref 9–17)
ANION GAP SERPL CALCULATED.3IONS-SCNC: 12 MMOL/L (ref 9–17)
BASOPHILS # BLD: 1 % (ref 0–2)
BASOPHILS ABSOLUTE: 0.07 K/UL (ref 0–0.2)
BUN BLDV-MCNC: 6 MG/DL (ref 6–20)
BUN BLDV-MCNC: 8 MG/DL (ref 6–20)
BUN/CREAT BLD: 12 (ref 9–20)
BUN/CREAT BLD: ABNORMAL (ref 9–20)
CALCIUM SERPL-MCNC: 9 MG/DL (ref 8.6–10.4)
CALCIUM SERPL-MCNC: 9 MG/DL (ref 8.6–10.4)
CHLORIDE BLD-SCNC: 103 MMOL/L (ref 98–107)
CHLORIDE BLD-SCNC: 98 MMOL/L (ref 98–107)
CO2: 23 MMOL/L (ref 20–31)
CO2: 25 MMOL/L (ref 20–31)
CREAT SERPL-MCNC: 0.48 MG/DL (ref 0.5–0.9)
CREAT SERPL-MCNC: 0.67 MG/DL (ref 0.5–0.9)
DIFFERENTIAL TYPE: NORMAL
EOSINOPHILS RELATIVE PERCENT: 1 % (ref 1–4)
GFR AFRICAN AMERICAN: >60 ML/MIN
GFR AFRICAN AMERICAN: >60 ML/MIN
GFR NON-AFRICAN AMERICAN: >60 ML/MIN
GFR NON-AFRICAN AMERICAN: >60 ML/MIN
GFR SERPL CREATININE-BSD FRML MDRD: ABNORMAL ML/MIN/{1.73_M2}
GLUCOSE BLD-MCNC: 146 MG/DL (ref 70–99)
GLUCOSE BLD-MCNC: 326 MG/DL (ref 70–99)
HCT VFR BLD CALC: 40.8 % (ref 36.3–47.1)
HEMOGLOBIN: 12.9 G/DL (ref 11.9–15.1)
IMMATURE GRANULOCYTES: 0 %
INR BLD: 1
LYMPHOCYTES # BLD: 36 % (ref 24–43)
MAGNESIUM: 2.2 MG/DL (ref 1.6–2.6)
MCH RBC QN AUTO: 28 PG (ref 25.2–33.5)
MCHC RBC AUTO-ENTMCNC: 31.6 G/DL (ref 28.4–34.8)
MCV RBC AUTO: 88.5 FL (ref 82.6–102.9)
MONOCYTES # BLD: 7 % (ref 3–12)
NRBC AUTOMATED: 0 PER 100 WBC
PDW BLD-RTO: 12.8 % (ref 11.8–14.4)
PLATELET # BLD: 250 K/UL (ref 138–453)
PLATELET ESTIMATE: NORMAL
PMV BLD AUTO: 10.3 FL (ref 8.1–13.5)
POTASSIUM SERPL-SCNC: 4.1 MMOL/L (ref 3.7–5.3)
POTASSIUM SERPL-SCNC: 4.3 MMOL/L (ref 3.7–5.3)
PROTHROMBIN TIME: 10.2 SEC (ref 9–12)
RBC # BLD: 4.61 M/UL (ref 3.95–5.11)
RBC # BLD: NORMAL 10*6/UL
SEG NEUTROPHILS: 55 % (ref 36–65)
SEGMENTED NEUTROPHILS ABSOLUTE COUNT: 4.56 K/UL (ref 1.5–8.1)
SODIUM BLD-SCNC: 135 MMOL/L (ref 135–144)
SODIUM BLD-SCNC: 138 MMOL/L (ref 135–144)
TROPONIN INTERP: NORMAL
TROPONIN T: NORMAL NG/ML
TROPONIN, HIGH SENSITIVITY: <6 NG/L (ref 0–14)
WBC # BLD: 8.3 K/UL (ref 3.5–11.3)
WBC # BLD: NORMAL 10*3/UL

## 2019-04-20 PROCEDURE — 83735 ASSAY OF MAGNESIUM: CPT

## 2019-04-20 PROCEDURE — 70498 CT ANGIOGRAPHY NECK: CPT

## 2019-04-20 PROCEDURE — 99285 EMERGENCY DEPT VISIT HI MDM: CPT

## 2019-04-20 PROCEDURE — 2580000003 HC RX 258: Performed by: EMERGENCY MEDICINE

## 2019-04-20 PROCEDURE — 84484 ASSAY OF TROPONIN QUANT: CPT

## 2019-04-20 PROCEDURE — 80048 BASIC METABOLIC PNL TOTAL CA: CPT

## 2019-04-20 PROCEDURE — G0378 HOSPITAL OBSERVATION PER HR: HCPCS

## 2019-04-20 PROCEDURE — 96375 TX/PRO/DX INJ NEW DRUG ADDON: CPT

## 2019-04-20 PROCEDURE — 36415 COLL VENOUS BLD VENIPUNCTURE: CPT

## 2019-04-20 PROCEDURE — 93005 ELECTROCARDIOGRAM TRACING: CPT

## 2019-04-20 PROCEDURE — 85025 COMPLETE CBC W/AUTO DIFF WBC: CPT

## 2019-04-20 PROCEDURE — 85610 PROTHROMBIN TIME: CPT

## 2019-04-20 PROCEDURE — 6360000004 HC RX CONTRAST MEDICATION: Performed by: EMERGENCY MEDICINE

## 2019-04-20 PROCEDURE — 6360000002 HC RX W HCPCS: Performed by: EMERGENCY MEDICINE

## 2019-04-20 PROCEDURE — 70496 CT ANGIOGRAPHY HEAD: CPT

## 2019-04-20 PROCEDURE — 1200000000 HC SEMI PRIVATE

## 2019-04-20 PROCEDURE — 96374 THER/PROPH/DIAG INJ IV PUSH: CPT

## 2019-04-20 RX ORDER — 0.9 % SODIUM CHLORIDE 0.9 %
1000 INTRAVENOUS SOLUTION INTRAVENOUS ONCE
Status: COMPLETED | OUTPATIENT
Start: 2019-04-20 | End: 2019-04-20

## 2019-04-20 RX ORDER — DEXAMETHASONE SODIUM PHOSPHATE 10 MG/ML
10 INJECTION INTRAMUSCULAR; INTRAVENOUS ONCE
Status: COMPLETED | OUTPATIENT
Start: 2019-04-20 | End: 2019-04-20

## 2019-04-20 RX ORDER — METOCLOPRAMIDE HYDROCHLORIDE 5 MG/ML
10 INJECTION INTRAMUSCULAR; INTRAVENOUS ONCE
Status: COMPLETED | OUTPATIENT
Start: 2019-04-20 | End: 2019-04-20

## 2019-04-20 RX ADMIN — DEXAMETHASONE SODIUM PHOSPHATE 10 MG: 10 INJECTION INTRAMUSCULAR; INTRAVENOUS at 21:19

## 2019-04-20 RX ADMIN — SODIUM CHLORIDE 1000 ML: 9 INJECTION, SOLUTION INTRAVENOUS at 21:20

## 2019-04-20 RX ADMIN — IOPAMIDOL 75 ML: 755 INJECTION, SOLUTION INTRAVENOUS at 17:33

## 2019-04-20 RX ADMIN — METOCLOPRAMIDE 10 MG: 5 INJECTION, SOLUTION INTRAMUSCULAR; INTRAVENOUS at 21:19

## 2019-04-20 ASSESSMENT — PAIN DESCRIPTION - DESCRIPTORS: DESCRIPTORS: SHARP;ACHING;DISCOMFORT

## 2019-04-20 ASSESSMENT — ENCOUNTER SYMPTOMS
RESPIRATORY NEGATIVE: 1
GASTROINTESTINAL NEGATIVE: 1
EYES NEGATIVE: 1
ALLERGIC/IMMUNOLOGIC NEGATIVE: 1

## 2019-04-20 ASSESSMENT — PAIN SCALES - GENERAL: PAINLEVEL_OUTOF10: 6

## 2019-04-20 ASSESSMENT — PAIN DESCRIPTION - LOCATION: LOCATION: NECK

## 2019-04-20 ASSESSMENT — PAIN DESCRIPTION - FREQUENCY: FREQUENCY: CONTINUOUS

## 2019-04-20 NOTE — ED PROVIDER NOTES
[facility] ED  eMERGENCY dEPARTMENT eNCOUnter      Pt Name: Derek Estrada  MRN: [id number]  [place] 1975  Date of evaluation: 4/20/2019  Provider: Lucy Doll MD    CHIEF COMPLAINT     Chief Complaint   Patient presents with    Neck Pain     onset this morning; hx of a vertebral artery dissection    Headache    Numbness         HISTORY OF PRESENT ILLNESS   (Location/Symptom, Timing/Onset, Context/Setting,Quality, Duration, Modifying Factors, Severity)  Note limiting factors. Derek Estrada is a36 y.o. female who presents to the emergency department      Is a 60-year-old female who presents with neck pain. 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 During that time she also started having seizures. At some point they were diagnosed with pseudoseizures she had a negative EEG but recently a 3 day EEG that did show potential for focal seizures. The dissection was diagnosed by CTA and showed an intimal flap. She also had an MRI at that time that showed a punctate hemorrhage in the cerebellum which would be consistent with a vertebral artery issue. She presents today with similar symptoms such as neck pain and numbness in her bilateral hands which she says she had previously with her vertebral dissection. She has no problems with ataxia. She does say she gets an occasional floater in her left lateral visual field. Nursing Notes werereviewed. REVIEW OF SYSTEMS    (2-9 systems for level 4, 10 or more for level 5)     Review of Systems   Constitutional: Negative. Respiratory: Negative. Cardiovascular: Negative. Gastrointestinal: Negative. Genitourinary: Negative. Musculoskeletal: Positive for neck pain and neck stiffness. Negative for joint swelling. Neurological: Positive for dizziness and light-headedness. Negative for syncope and weakness.        Except as noted above the remainder of day.Normal      naproxen (NAPROSYN) 375 MG tablet Take 1 tablet by mouth 2 times daily, Disp-30 tablet, R-0Normal      ALPRAZolam (XANAX) 0.25 MG tablet Take 0.25 mg by mouth nightly as needed for Sleep. Mono Edwina Historical Med      oxyCODONE-acetaminophen (PERCOCET) 5-325 MG per tablet Take 1 tablet by mouth once. ibuprofen (ADVIL;MOTRIN) 800 MG tablet Take 800 mg by mouth every 6 hours as needed for Pain. !! - Potential duplicate medications found. Please discuss with provider. ALLERGIES     Bee venom; Other; and Tape [adhesive tape]    FAMILY HISTORY       Family History   Problem Relation Age of Onset    Cancer Father     Stroke Maternal Grandmother     Cancer Paternal Grandfather           SOCIAL HISTORY       Social History     Socioeconomic History    Marital status:      Spouse name: Not on file    Number of children: Not on file    Years of education: Not on file    Highest education level: Not on file   Occupational History    Not on file   Social Needs    Financial resource strain: Not on file    Food insecurity:     Worry: Not on file     Inability: Not on file    Transportation needs:     Medical: Not on file     Non-medical: Not on file   Tobacco Use    Smoking status: Current Every Day Smoker     Packs/day: 0.25     Years: 25.00     Pack years: 6.25     Types: Cigarettes    Smokeless tobacco: Never Used    Tobacco comment: 5 packs a month   Substance and Sexual Activity    Alcohol use: No     Comment:  Occ    Drug use: No    Sexual activity: Yes   Lifestyle    Physical activity:     Days per week: Not on file     Minutes per session: Not on file    Stress: Not on file   Relationships    Social connections:     Talks on phone: Not on file     Gets together: Not on file     Attends Mosque service: Not on file     Active member of club or organization: Not on file     Attends meetings of clubs or organizations: Not on file     Relationship status: Not on file   Shruthi Escalera Intimate partner violence:     Fear of current or ex partner: Not on file     Emotionally abused: Not on file     Physically abused: Not on file     Forced sexual activity: Not on file   Other Topics Concern    Not on file   Social History Narrative    Not on file       SCREENINGS    [place] Coma Scale  Eye Opening: Spontaneous  Best Verbal Response: Oriented  Best Motor Response: Obeys commands  [place] Coma Scale Score: 15 @FLOW([id number])@      PHYSICAL EXAM    (up to 7 for level 4, 8 or more for level 5)     ED Triage Vitals [04/20/19 1606]   BP Temp Temp Source Pulse Resp SpO2 Height Weight   132/87 97 °F (36.1 °C) Tympanic 77 14 99 % -- --       Physical Exam   Constitutional: She is oriented to person, place, and time. HENT:   Head: Normocephalic and atraumatic. Right Ear: External ear normal.   Left Ear: External ear normal.   Eyes: Pupils are equal, round, and reactive to light. Conjunctivae and EOM are normal.   Neck: Normal range of motion. Neck supple. Cardiovascular: Normal rate, regular rhythm and normal heart sounds. Pulmonary/Chest: Effort normal and breath sounds normal.   Abdominal: Soft. Bowel sounds are normal. She exhibits no distension. There is no tenderness. There is no rebound and no guarding. Musculoskeletal: Normal range of motion. Neurological: She is alert and oriented to person, place, and time. She has normal reflexes. Skin: Skin is warm and dry.    Psychiatric: Judgment normal.       DIAGNOSTIC RESULTS     EKG: All EKG's are interpreted by the Emergency Department Physician who either signs orCo-signs this chart in the absence of a cardiologist.      RADIOLOGY:   Non-plainfilm images such as CT, Ultrasound and MRI are read by the radiologist. Plain radiographic images are visualized and preliminarily interpreted by the emergency physician with the below findings:      Interpretationper the Radiologist below, if available at the time of this note:    CTA Head Casey Dakin Contrast   Final Result   1. No acute intracranial abnormality. 2. Unremarkable CTA of the head. CTA Neck W WO Contrast   Final Result   No acute abnormality or flow-limiting stenosis of the major arteries of the   neck. The previously questioned dissection along the distal V3 segment of the left   vertebral artery is likely related to atherosclerotic calcification. 7 mm lung nodule at the right lung apex, stable since January 15, 2019,   increased in size since August 27, 2014 when it measured up to 5 mm. Follow-up recommendation is listed below. RECOMMENDATIONS:   Fleischner Society guidelines for follow-up and management of incidentally   detected pulmonary nodules:      Single Solid Nodule:      Nodule size equals 6-8 mm   In a low-risk patient, CT at 6-12 months, then consider CT at 18-24 months. In a high-risk patient, CT at 6-12 months, then CT at 18-24 months. - Low risk patients include individuals with minimal or absent history of   smoking and other known risk factors. - High risk patients include individuals with a history or smoking or known   risk factors. Radiology 2017 http://pubs. rsna.org/doi/full/10.1148/radiol. 1319291426               ED BEDSIDE ULTRASOUND:   Performed by ED Physician - none    LABS:  Labs Reviewed   BASIC METABOLIC PANEL - Abnormal; Notable for the following components:       Result Value    Glucose 326 (*)     All other components within normal limits       All other labs were within normal range or not returned as of this dictation. EMERGENCY DEPARTMENT COURSE and DIFFERENTIAL DIAGNOSIS/MDM:   Vitals:    Vitals:    04/20/19 1606 04/20/19 1941   BP: 132/87    Pulse: 77 82   Resp: 14 18   Temp: 97 °F (36.1 °C)    TempSrc: Tympanic    SpO2: 99% 98%         MDM          Procedures    FINAL IMPRESSION      1.  Neck pain        DISPOSITION/PLAN   DISPOSITION Decision To Transfer 04/20/2019 07:00:38 PM      PATIENT REFERRED TO:  No follow-up provider specified. DISCHARGE MEDICATIONS:  Discharge Medication List as of 4/20/2019  7:48 PM                 Summation      Patient Course:      ED Medications administered this visit:    Medications   iopamidol (ISOVUE-370) 76 % injection 75 mL (75 mLs Intravenous Given 4/20/19 9388)       New Prescriptions from this visit:    Discharge Medication List as of 4/20/2019  7:48 PM          Follow-up:  No follow-up provider specified. Final Impression:   1.  Neck pain               (Please note that portions of this note were completed with a voice recognition program.  Efforts were made to edit the dictations but occasionally words are mis-transcribed.)           Ashleigh Valle MD  04/30/19 4551

## 2019-04-20 NOTE — ED NOTES
Attempted to call report on patient to ED. ED charge nurse transfers me to Endo Lab where I got a recording. Message left to call [place] ED if any questions or report needed.      Cristina Gardner RN  04/20/19 5095

## 2019-04-20 NOTE — ED NOTES
[facility] called spoke with [employer]. To initiate transfer to [facility] that Dr Franchesca Ivey has already spoke with Dr Christiana Mays.      Leigh Ann Simmons  04/20/19 6300

## 2019-04-21 ENCOUNTER — APPOINTMENT (OUTPATIENT)
Dept: CT IMAGING | Age: 44
DRG: 058 | End: 2019-04-21
Payer: COMMERCIAL

## 2019-04-21 ENCOUNTER — APPOINTMENT (OUTPATIENT)
Dept: MRI IMAGING | Age: 44
DRG: 058 | End: 2019-04-21
Payer: COMMERCIAL

## 2019-04-21 LAB
ABSOLUTE EOS #: <0.03 K/UL (ref 0–0.44)
ABSOLUTE IMMATURE GRANULOCYTE: 0.04 K/UL (ref 0–0.3)
ABSOLUTE LYMPH #: 1.09 K/UL (ref 1.1–3.7)
ABSOLUTE MONO #: 0.32 K/UL (ref 0.1–1.2)
ANION GAP SERPL CALCULATED.3IONS-SCNC: 10 MMOL/L (ref 9–17)
BASOPHILS # BLD: 0 % (ref 0–2)
BASOPHILS ABSOLUTE: <0.03 K/UL (ref 0–0.2)
BUN BLDV-MCNC: 10 MG/DL (ref 6–20)
BUN/CREAT BLD: ABNORMAL (ref 9–20)
CALCIUM SERPL-MCNC: 8.6 MG/DL (ref 8.6–10.4)
CHLORIDE BLD-SCNC: 106 MMOL/L (ref 98–107)
CHOLESTEROL/HDL RATIO: 2.2
CHOLESTEROL: 134 MG/DL
CO2: 23 MMOL/L (ref 20–31)
CREAT SERPL-MCNC: 0.67 MG/DL (ref 0.5–0.9)
DIFFERENTIAL TYPE: ABNORMAL
EOSINOPHILS RELATIVE PERCENT: 0 % (ref 1–4)
FOLATE: >20 NG/ML
GFR AFRICAN AMERICAN: >60 ML/MIN
GFR NON-AFRICAN AMERICAN: >60 ML/MIN
GFR SERPL CREATININE-BSD FRML MDRD: ABNORMAL ML/MIN/{1.73_M2}
GFR SERPL CREATININE-BSD FRML MDRD: ABNORMAL ML/MIN/{1.73_M2}
GLUCOSE BLD-MCNC: 257 MG/DL (ref 65–105)
GLUCOSE BLD-MCNC: 262 MG/DL (ref 65–105)
GLUCOSE BLD-MCNC: 311 MG/DL (ref 70–99)
GLUCOSE BLD-MCNC: 343 MG/DL (ref 65–105)
GLUCOSE BLD-MCNC: 353 MG/DL (ref 65–105)
GLUCOSE BLD-MCNC: 405 MG/DL (ref 65–105)
HCT VFR BLD CALC: 40.7 % (ref 36.3–47.1)
HDLC SERPL-MCNC: 61 MG/DL
HEMOGLOBIN: 12.4 G/DL (ref 11.9–15.1)
IMMATURE GRANULOCYTES: 0 %
LDL CHOLESTEROL: 63 MG/DL (ref 0–130)
LYMPHOCYTES # BLD: 9 % (ref 24–43)
MCH RBC QN AUTO: 28 PG (ref 25.2–33.5)
MCHC RBC AUTO-ENTMCNC: 30.5 G/DL (ref 28.4–34.8)
MCV RBC AUTO: 91.9 FL (ref 82.6–102.9)
MONOCYTES # BLD: 3 % (ref 3–12)
NRBC AUTOMATED: 0 PER 100 WBC
PDW BLD-RTO: 12.9 % (ref 11.8–14.4)
PLATELET # BLD: 243 K/UL (ref 138–453)
PLATELET ESTIMATE: ABNORMAL
PMV BLD AUTO: 10.3 FL (ref 8.1–13.5)
POTASSIUM SERPL-SCNC: 4.8 MMOL/L (ref 3.7–5.3)
RBC # BLD: 4.43 M/UL (ref 3.95–5.11)
RBC # BLD: ABNORMAL 10*6/UL
SEG NEUTROPHILS: 89 % (ref 36–65)
SEGMENTED NEUTROPHILS ABSOLUTE COUNT: 11.36 K/UL (ref 1.5–8.1)
SODIUM BLD-SCNC: 139 MMOL/L (ref 135–144)
TRIGL SERPL-MCNC: 50 MG/DL
VITAMIN B-12: 518 PG/ML (ref 232–1245)
VLDLC SERPL CALC-MCNC: NORMAL MG/DL (ref 1–30)
WBC # BLD: 12.8 K/UL (ref 3.5–11.3)
WBC # BLD: ABNORMAL 10*3/UL

## 2019-04-21 PROCEDURE — 96376 TX/PRO/DX INJ SAME DRUG ADON: CPT

## 2019-04-21 PROCEDURE — 80061 LIPID PANEL: CPT

## 2019-04-21 PROCEDURE — 2580000003 HC RX 258: Performed by: EMERGENCY MEDICINE

## 2019-04-21 PROCEDURE — 99223 1ST HOSP IP/OBS HIGH 75: CPT | Performed by: INTERNAL MEDICINE

## 2019-04-21 PROCEDURE — G0378 HOSPITAL OBSERVATION PER HR: HCPCS

## 2019-04-21 PROCEDURE — 80048 BASIC METABOLIC PNL TOTAL CA: CPT

## 2019-04-21 PROCEDURE — 72156 MRI NECK SPINE W/O & W/DYE: CPT

## 2019-04-21 PROCEDURE — 96365 THER/PROPH/DIAG IV INF INIT: CPT

## 2019-04-21 PROCEDURE — 82607 VITAMIN B-12: CPT

## 2019-04-21 PROCEDURE — 85025 COMPLETE CBC W/AUTO DIFF WBC: CPT

## 2019-04-21 PROCEDURE — 82947 ASSAY GLUCOSE BLOOD QUANT: CPT

## 2019-04-21 PROCEDURE — 1200000000 HC SEMI PRIVATE

## 2019-04-21 PROCEDURE — 6360000002 HC RX W HCPCS: Performed by: STUDENT IN AN ORGANIZED HEALTH CARE EDUCATION/TRAINING PROGRAM

## 2019-04-21 PROCEDURE — 82746 ASSAY OF FOLIC ACID SERUM: CPT

## 2019-04-21 PROCEDURE — 92523 SPEECH SOUND LANG COMPREHEN: CPT

## 2019-04-21 PROCEDURE — 96375 TX/PRO/DX INJ NEW DRUG ADDON: CPT

## 2019-04-21 PROCEDURE — 96366 THER/PROPH/DIAG IV INF ADDON: CPT

## 2019-04-21 PROCEDURE — 6360000002 HC RX W HCPCS: Performed by: EMERGENCY MEDICINE

## 2019-04-21 PROCEDURE — A9579 GAD-BASE MR CONTRAST NOS,1ML: HCPCS | Performed by: EMERGENCY MEDICINE

## 2019-04-21 PROCEDURE — 96372 THER/PROPH/DIAG INJ SC/IM: CPT

## 2019-04-21 PROCEDURE — 6370000000 HC RX 637 (ALT 250 FOR IP): Performed by: EMERGENCY MEDICINE

## 2019-04-21 PROCEDURE — 6360000004 HC RX CONTRAST MEDICATION: Performed by: EMERGENCY MEDICINE

## 2019-04-21 PROCEDURE — 6370000000 HC RX 637 (ALT 250 FOR IP): Performed by: STUDENT IN AN ORGANIZED HEALTH CARE EDUCATION/TRAINING PROGRAM

## 2019-04-21 PROCEDURE — 70551 MRI BRAIN STEM W/O DYE: CPT

## 2019-04-21 PROCEDURE — 99223 1ST HOSP IP/OBS HIGH 75: CPT | Performed by: STUDENT IN AN ORGANIZED HEALTH CARE EDUCATION/TRAINING PROGRAM

## 2019-04-21 PROCEDURE — 2580000003 HC RX 258: Performed by: STUDENT IN AN ORGANIZED HEALTH CARE EDUCATION/TRAINING PROGRAM

## 2019-04-21 PROCEDURE — 36415 COLL VENOUS BLD VENIPUNCTURE: CPT

## 2019-04-21 RX ORDER — SODIUM CHLORIDE 9 MG/ML
INJECTION, SOLUTION INTRAVENOUS CONTINUOUS
Status: DISCONTINUED | OUTPATIENT
Start: 2019-04-21 | End: 2019-04-23 | Stop reason: HOSPADM

## 2019-04-21 RX ORDER — ACETAMINOPHEN 325 MG/1
650 TABLET ORAL EVERY 4 HOURS PRN
Status: DISCONTINUED | OUTPATIENT
Start: 2019-04-21 | End: 2019-04-21 | Stop reason: SDUPTHER

## 2019-04-21 RX ORDER — ONDANSETRON 2 MG/ML
4 INJECTION INTRAMUSCULAR; INTRAVENOUS EVERY 6 HOURS PRN
Status: DISCONTINUED | OUTPATIENT
Start: 2019-04-21 | End: 2019-04-23 | Stop reason: HOSPADM

## 2019-04-21 RX ORDER — MAGNESIUM SULFATE 1 G/100ML
2 INJECTION INTRAVENOUS ONCE
Status: COMPLETED | OUTPATIENT
Start: 2019-04-21 | End: 2019-04-21

## 2019-04-21 RX ORDER — SODIUM CHLORIDE 0.9 % (FLUSH) 0.9 %
10 SYRINGE (ML) INJECTION EVERY 12 HOURS SCHEDULED
Status: DISCONTINUED | OUTPATIENT
Start: 2019-04-21 | End: 2019-04-23 | Stop reason: HOSPADM

## 2019-04-21 RX ORDER — SODIUM CHLORIDE 0.9 % (FLUSH) 0.9 %
10 SYRINGE (ML) INJECTION PRN
Status: DISCONTINUED | OUTPATIENT
Start: 2019-04-21 | End: 2019-04-23 | Stop reason: HOSPADM

## 2019-04-21 RX ORDER — NICOTINE POLACRILEX 4 MG
15 LOZENGE BUCCAL PRN
Status: DISCONTINUED | OUTPATIENT
Start: 2019-04-21 | End: 2019-04-23 | Stop reason: HOSPADM

## 2019-04-21 RX ORDER — SODIUM CHLORIDE 9 MG/ML
INJECTION, SOLUTION INTRAVENOUS CONTINUOUS
Status: DISCONTINUED | OUTPATIENT
Start: 2019-04-21 | End: 2019-04-21

## 2019-04-21 RX ORDER — CETIRIZINE HYDROCHLORIDE 10 MG/1
10 TABLET ORAL NIGHTLY
Status: DISCONTINUED | OUTPATIENT
Start: 2019-04-21 | End: 2019-04-23 | Stop reason: HOSPADM

## 2019-04-21 RX ORDER — SODIUM CHLORIDE 0.9 % (FLUSH) 0.9 %
10 SYRINGE (ML) INJECTION 2 TIMES DAILY
Status: DISCONTINUED | OUTPATIENT
Start: 2019-04-21 | End: 2019-04-23 | Stop reason: HOSPADM

## 2019-04-21 RX ORDER — FOLIC ACID 1 MG/1
1 TABLET ORAL 2 TIMES DAILY
Status: DISCONTINUED | OUTPATIENT
Start: 2019-04-21 | End: 2019-04-23 | Stop reason: HOSPADM

## 2019-04-21 RX ORDER — METOCLOPRAMIDE HYDROCHLORIDE 5 MG/ML
10 INJECTION INTRAMUSCULAR; INTRAVENOUS ONCE
Status: COMPLETED | OUTPATIENT
Start: 2019-04-21 | End: 2019-04-21

## 2019-04-21 RX ORDER — SODIUM CHLORIDE 0.9 % (FLUSH) 0.9 %
10 SYRINGE (ML) INJECTION EVERY 12 HOURS SCHEDULED
Status: DISCONTINUED | OUTPATIENT
Start: 2019-04-21 | End: 2019-04-21

## 2019-04-21 RX ORDER — ACETAMINOPHEN 325 MG/1
650 TABLET ORAL EVERY 4 HOURS PRN
Status: DISCONTINUED | OUTPATIENT
Start: 2019-04-21 | End: 2019-04-23 | Stop reason: HOSPADM

## 2019-04-21 RX ORDER — DEXTROSE MONOHYDRATE 50 MG/ML
100 INJECTION, SOLUTION INTRAVENOUS PRN
Status: DISCONTINUED | OUTPATIENT
Start: 2019-04-21 | End: 2019-04-23 | Stop reason: HOSPADM

## 2019-04-21 RX ORDER — DIPHENHYDRAMINE HYDROCHLORIDE 50 MG/ML
25 INJECTION INTRAMUSCULAR; INTRAVENOUS ONCE
Status: COMPLETED | OUTPATIENT
Start: 2019-04-21 | End: 2019-04-21

## 2019-04-21 RX ORDER — DEXTROSE MONOHYDRATE 25 G/50ML
12.5 INJECTION, SOLUTION INTRAVENOUS PRN
Status: DISCONTINUED | OUTPATIENT
Start: 2019-04-21 | End: 2019-04-23 | Stop reason: HOSPADM

## 2019-04-21 RX ORDER — ATORVASTATIN CALCIUM 40 MG/1
40 TABLET, FILM COATED ORAL NIGHTLY
Status: DISCONTINUED | OUTPATIENT
Start: 2019-04-21 | End: 2019-04-23 | Stop reason: HOSPADM

## 2019-04-21 RX ORDER — KETOROLAC TROMETHAMINE 15 MG/ML
15 INJECTION, SOLUTION INTRAMUSCULAR; INTRAVENOUS ONCE
Status: COMPLETED | OUTPATIENT
Start: 2019-04-21 | End: 2019-04-21

## 2019-04-21 RX ADMIN — Medication 10 ML: at 09:22

## 2019-04-21 RX ADMIN — METOCLOPRAMIDE 10 MG: 5 INJECTION, SOLUTION INTRAMUSCULAR; INTRAVENOUS at 02:59

## 2019-04-21 RX ADMIN — KETOROLAC TROMETHAMINE 15 MG: 15 INJECTION, SOLUTION INTRAMUSCULAR; INTRAVENOUS at 02:59

## 2019-04-21 RX ADMIN — SODIUM CHLORIDE: 9 INJECTION, SOLUTION INTRAVENOUS at 21:19

## 2019-04-21 RX ADMIN — LEVETIRACETAM 750 MG: 500 TABLET, FILM COATED ORAL at 09:21

## 2019-04-21 RX ADMIN — LEVETIRACETAM 750 MG: 500 TABLET, FILM COATED ORAL at 21:18

## 2019-04-21 RX ADMIN — DESMOPRESSIN ACETATE 40 MG: 0.2 TABLET ORAL at 21:17

## 2019-04-21 RX ADMIN — FOLIC ACID 1 MG: 1 TABLET ORAL at 05:00

## 2019-04-21 RX ADMIN — CETIRIZINE HYDROCHLORIDE 10 MG: 10 TABLET ORAL at 21:34

## 2019-04-21 RX ADMIN — ACETAMINOPHEN 650 MG: 325 TABLET ORAL at 09:20

## 2019-04-21 RX ADMIN — FOLIC ACID 1 MG: 1 TABLET ORAL at 21:17

## 2019-04-21 RX ADMIN — DIPHENHYDRAMINE HYDROCHLORIDE 25 MG: 50 INJECTION INTRAMUSCULAR; INTRAVENOUS at 02:59

## 2019-04-21 RX ADMIN — SODIUM CHLORIDE: 9 INJECTION, SOLUTION INTRAVENOUS at 04:27

## 2019-04-21 RX ADMIN — MAGNESIUM SULFATE HEPTAHYDRATE 2 G: 1 INJECTION, SOLUTION INTRAVENOUS at 05:00

## 2019-04-21 RX ADMIN — GADOTERIDOL 18 ML: 279.3 INJECTION, SOLUTION INTRAVENOUS at 01:49

## 2019-04-21 RX ADMIN — ASPIRIN 325 MG: 325 TABLET, COATED ORAL at 09:21

## 2019-04-21 RX ADMIN — ENOXAPARIN SODIUM 40 MG: 40 INJECTION SUBCUTANEOUS at 09:22

## 2019-04-21 ASSESSMENT — PAIN DESCRIPTION - ONSET: ONSET: ON-GOING

## 2019-04-21 ASSESSMENT — PAIN SCALES - GENERAL
PAINLEVEL_OUTOF10: 0
PAINLEVEL_OUTOF10: 8
PAINLEVEL_OUTOF10: 6
PAINLEVEL_OUTOF10: 0

## 2019-04-21 ASSESSMENT — PAIN DESCRIPTION - PROGRESSION: CLINICAL_PROGRESSION: NOT CHANGED

## 2019-04-21 ASSESSMENT — PAIN DESCRIPTION - LOCATION: LOCATION: HEAD;NECK

## 2019-04-21 ASSESSMENT — PAIN DESCRIPTION - ORIENTATION: ORIENTATION: LEFT

## 2019-04-21 ASSESSMENT — PAIN DESCRIPTION - FREQUENCY: FREQUENCY: CONTINUOUS

## 2019-04-21 ASSESSMENT — PAIN DESCRIPTION - DESCRIPTORS: DESCRIPTORS: DULL;ACHING;SORE

## 2019-04-21 ASSESSMENT — PAIN DESCRIPTION - PAIN TYPE: TYPE: ACUTE PAIN

## 2019-04-21 ASSESSMENT — PAIN - FUNCTIONAL ASSESSMENT: PAIN_FUNCTIONAL_ASSESSMENT: ACTIVITIES ARE NOT PREVENTED

## 2019-04-21 NOTE — CONSULTS
KNEE CARTILAGE SURGERY Left     OVARY REMOVAL Bilateral     TUBAL LIGATION         Social History:   Social History     Socioeconomic History    Marital status:      Spouse name: Not on file    Number of children: Not on file    Years of education: Not on file    Highest education level: Not on file   Occupational History    Not on file   Social Needs    Financial resource strain: Not on file    Food insecurity:     Worry: Not on file     Inability: Not on file    Transportation needs:     Medical: Not on file     Non-medical: Not on file   Tobacco Use    Smoking status: Current Every Day Smoker     Packs/day: 0.25     Years: 25.00     Pack years: 6.25     Types: Cigarettes    Smokeless tobacco: Never Used    Tobacco comment: 5 packs a month   Substance and Sexual Activity    Alcohol use: No     Comment: Occ    Drug use: No    Sexual activity: Yes   Lifestyle    Physical activity:     Days per week: Not on file     Minutes per session: Not on file    Stress: Not on file   Relationships    Social connections:     Talks on phone: Not on file     Gets together: Not on file     Attends Baptism service: Not on file     Active member of club or organization: Not on file     Attends meetings of clubs or organizations: Not on file     Relationship status: Not on file    Intimate partner violence:     Fear of current or ex partner: Not on file     Emotionally abused: Not on file     Physically abused: Not on file     Forced sexual activity: Not on file   Other Topics Concern    Not on file   Social History Narrative    Not on file       Family History:       Problem Relation Age of Onset    Cancer Father     Stroke Maternal Grandmother     Cancer Paternal Grandfather        Allergies:  Bee venom; Other; and Tape [adhesive tape]    Home Medications:  Prior to Admission medications    Medication Sig Start Date End Date Taking?  Authorizing Provider   KEPPRA 250 MG tablet Take 3 tablets by BID  folic acid (FOLVITE) tablet 1 mg, 1 mg, Oral, BID  sodium chloride flush 0.9 % injection 10 mL, 10 mL, Intravenous, PRN  magnesium hydroxide (MILK OF MAGNESIA) 400 MG/5ML suspension 30 mL, 30 mL, Oral, Daily PRN  ondansetron (ZOFRAN) injection 4 mg, 4 mg, Intravenous, Q6H PRN  enoxaparin (LOVENOX) injection 40 mg, 40 mg, Subcutaneous, Daily  0.9 % sodium chloride infusion, , Intravenous, Continuous  sodium chloride flush 0.9 % injection 10 mL, 10 mL, Intravenous, BID  Insulin Pump - insulin lispro (Patient Supplied), 36 Units, Subcutaneous, Continuous  glucose (GLUTOSE) 40 % oral gel 15 g, 15 g, Oral, PRN  dextrose 50 % solution 12.5 g, 12.5 g, Intravenous, PRN  glucagon (rDNA) injection 1 mg, 1 mg, Intramuscular, PRN  dextrose 5 % solution, 100 mL/hr, Intravenous, PRN    REVIEW OF SYSTEMS:       CONSTITUTIONAL: negative for fatigue and malaise   EYES: negative for double vision and photophobia    HEENT: negative for tinnitus and sore throat   RESPIRATORY: negative for cough, shortness of breath   CARDIOVASCULAR: negative for chest pain, palpitations   GASTROINTESTINAL: negative for nausea, vomiting   GENITOURINARY: negative for incontinence   MUSCULOSKELETAL: positive for neck or back pain   NEUROLOGICAL: postive for seizures   PSYCHIATRIC: negative for agitated     Review of systems otherwise negative.     PHYSICAL EXAM:       /67   Pulse 79   Temp 98.6 °F (37 °C) (Oral)   Resp 14   Ht 6' 1\" (1.854 m)   Wt 214 lb (97.1 kg)   SpO2 94%   BMI 28.23 kg/m²       CONSTITUTIONAL: no apparent distress, appears stated age   HEAD: normocephalic, atraumatic   ENT: moist mucous membranes, uvula midline   NECK: supple, symmetric   LUNGS: clear to auscultation bilaterally   CARDIOVASCULAR: regular rate and rhythm   ABDOMEN: Soft   NEUROLOGIC:  EYE OPENING     Spontaneous - 4 [x]       To voice - 3 []       To pain - 2 []       None - 1 []    VERBAL RESPONSE     Appropriate, oriented - 5 [x]       Dazed or Contrast    Result Date: 4/20/2019  EXAMINATION: CTA OF THE HEAD WITHOUT CONTRAST  4/20/2019 5:36 pm TECHNIQUE: CTA of the head/brain was performed without and with the administration of intravenous contrast. Multiplanar reformatted images are provided for review. MIP images are provided for review. Dose modulation, iterative reconstruction, and/or weight based adjustment of the mA/kV was utilized to reduce the radiation dose to as low as reasonably achievable. COMPARISON: None HISTORY: ORDERING SYSTEM PROVIDED HISTORY: h/o verterbral dissection w/clot FINDINGS: CT HEAD: BRAIN/VENTRICLES:  No acute intracranial hemorrhage or extraaxial fluid collection. Grey-white differentiation is maintained. No evidence of mass, mass effect or midline shift. No evidence of hydrocephalus. ORBITS: The visualized portion of the orbits demonstrate no acute abnormality. SINUSES:  The visualized paranasal sinuses and mastoid air cells demonstrate no acute abnormality. SOFT TISSUES/SKULL: No acute abnormality of the visualized skull or soft tissues. CTA HEAD: ANTERIOR CIRCULATION: The internal carotid arteries are normal in course and caliber without focal stenosis. The anterior cerebral and middle cerebral arteries demonstrate no focal stenosis. POSTERIOR CIRCULATION: The posterior cerebral arteries demonstrate no focal stenosis. The vertebral and basilar arteries appear unremarkable. 1. No acute intracranial abnormality. 2. Unremarkable CTA of the head. Cta Neck W Wo Contrast    Result Date: 4/20/2019  EXAMINATION: CTA OF THE NECK 4/20/2019 5:35 pm TECHNIQUE: CTA of the neck was performed with the administration of intravenous contrast. Multiplanar reformatted images are provided for review. MIP images are provided for review. Stenosis of the internal carotid arteries measured using NASCET criteria.  Dose modulation, iterative reconstruction, and/or weight based adjustment of the mA/kV was utilized to reduce the radiation dose to as low as reasonably achievable. COMPARISON: CT neck January 15, 2019, CT chest August 27, 2014 HISTORY: ORDERING SYSTEM PROVIDED HISTORY: h/o verterbral artery dissection with clor FINDINGS: AORTIC ARCH/ARCH VESSELS: There is a normal branch pattern of the aortic arch. No significant stenosis is seen of the innominate artery or subclavian arteries. CAROTID ARTERIES: The common carotid arteries are normal in appearance without evidence of a flow limiting stenosis. The internal carotid arteries are normal in appearance without evidence of a flow limiting stenosis by NASCET criteria. No dissection or arterial injury is seen. VERTEBRAL ARTERIES: The vertebral arteries both arise from the subclavian arteries and are normal in caliber without evidence of flow limiting stenosis or dissection. The previously questioned dissection along the distal V3 segment of the left vertebral artery is likely related to atherosclerotic calcification (series 5 image 672, series 602 image 161, series 601, image 145). SOFT TISSUES:  There is a 7 mm lung nodule at the right lung apex (series 5, image 121), stable since January 15, 2019, increased in size since August 27, 2014 when it measured up to 5 mm. The lung apices are otherwise clear. No superior mediastinal lymphadenopathy. There are nonenlarged bilateral cervical lymph nodes, likely reactive. There are mildly prominent bilateral palatine and lingual tonsils, likely reactive. The parotid, submandibular and thyroid glands demonstrate no acute abnormality. BONES: The visualized osseous structures appear unremarkable. No acute abnormality or flow-limiting stenosis of the major arteries of the neck. The previously questioned dissection along the distal V3 segment of the left vertebral artery is likely related to atherosclerotic calcification.  7 mm lung nodule at the right lung apex, stable since January 15, 2019, increased in size since August 27, 2014 when it measured up to 5 mm. Follow-up recommendation is listed below. RECOMMENDATIONS: Fleischner Society guidelines for follow-up and management of incidentally detected pulmonary nodules: Single Solid Nodule: Nodule size equals 6-8 mm In a low-risk patient, CT at 6-12 months, then consider CT at 18-24 months. In a high-risk patient, CT at 6-12 months, then CT at 18-24 months. - Low risk patients include individuals with minimal or absent history of smoking and other known risk factors. - High risk patients include individuals with a history or smoking or known risk factors. Radiology 2017 http://pubs. rsna.org/doi/full/10.1148/radiol. 0435375188     Mri Cervical Spine W Wo Contrast    Result Date: 4/21/2019  EXAMINATION: MRI OF THE CERVICAL SPINE WITHOUT AND WITH CONTRAST  4/21/2019 1:14 am: TECHNIQUE: Multiplanar multisequence MRI of the cervical spine was performed without and with the administration of intravenous contrast. COMPARISON: None. HISTORY: ORDERING SYSTEM PROVIDED HISTORY: Neck pain, numbness FINDINGS: BONES/ALIGNMENT: There is normal alignment of the spine. The vertebral body heights are maintained. There is focal marrow edema and enhancement left-sided T2 vertebral body. The bone marrow signal appears otherwise unremarkable. SPINAL CORD: No abnormal cord signal is seen. SOFT TISSUES: No abnormal enhancement of the cervical spine. No paraspinal mass identified. C2-C3: There is no significant disc protrusion, spinal canal stenosis or neural foraminal narrowing. C3-C4: There is no significant disc protrusion, spinal canal stenosis or neural foraminal narrowing. C4-C5: There is no significant disc protrusion, spinal canal stenosis or neural foraminal narrowing. C5-C6: There is no significant disc protrusion, spinal canal stenosis or neural foraminal narrowing. C6-C7: There is no significant disc protrusion, spinal canal stenosis or neural foraminal narrowing.  C7-T1: There is no significant disc protrusion, spinal canal stenosis or neural foraminal narrowing. Focal marrow edema and enhancement in a portion of the T2 vertebral body, indeterminate lesion. Consider further characterization with dedicated thoracic and lumbar spine MRI with and without contrast     Mri Brain Wo Contrast    Result Date: 4/21/2019  EXAMINATION: MRI OF THE BRAIN WITHOUT CONTRAST  4/21/2019 1:00 am TECHNIQUE: Multiplanar multisequence MRI of the brain was performed without the administration of intravenous contrast. COMPARISON: None. HISTORY: ORDERING SYSTEM PROVIDED HISTORY: Numbness FINDINGS: INTRACRANIAL STRUCTURES/VENTRICLES: There is no acute infarct. No mass effect or midline shift. No evidence of an acute intracranial hemorrhage. The ventricles and sulci are normal in size and configuration. The sellar/suprasellar regions appear unremarkable. The normal signal voids within the major intracranial vessels appear maintained. ORBITS: The visualized portion of the orbits demonstrate no acute abnormality. SINUSES: The visualized paranasal sinuses and mastoid air cells are well aerated. BONES/SOFT TISSUES: The bone marrow signal intensity appears normal. The soft tissues demonstrate no acute abnormality. Unremarkable brain MRI. 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 female with neck pain found to have T2 lesion on MRI cervical spine. Patient care will be discussed with attending, will reevaluated patient along with attending.      - No neurosurgical interventions planned for now.    - CT chest abdomen and pelvis to eval for other lesions  - may need IR biopsy of T2 lesion      Additional recommendations may follow    Please contact neurosurgery with any changes in patients neurologic status. Thank you for your consult.        LESLIE Montes - ILIANA   NS pager [phone]  4/21/2019  4:40 PM

## 2019-04-21 NOTE — ED PROVIDER NOTES
[facility]     Emergency Department     Faculty Attestation    I performed a history and physical examination of the patient and discussed management with the resident. I reviewed the residents note and agree with the documented findings and plan of care. Any areas of disagreement are noted on the chart. I was personally present for the key portions of any procedures. I have documented in the chart those procedures where I was not present during the key portions. I have reviewed the emergency nurses triage note. I agree with the chief complaint, past medical history, past surgical history, allergies, medications, social and family history as documented unless otherwise noted below. Documentation of the HPI, Physical Exam and Medical Decision Making performed by medical students or scribes is based on my personal performance of the HPI, PE and MDM. For Midlevel providers: I have personally seen and evaluated the patient. I find the patient's history and physical exam are consistent with the NP/PA documentation.   I agree with the care provided, treatment rendered, disposition and follow-up plan               Critical Care          Larri Olszewski, MD Damien Goes, MD  04/20/19 1585

## 2019-04-21 NOTE — PROGRESS NOTES
[address] [place] [address]     Department of Internal Medicine - Staff Internal Medicine Service     DAILY PROGRESS NOTE - TEAM       Patient:  Margo Merino  YOB: 1975  MRN: [id number]     Acct: [de-identified]     Admit date: 4/20/2019  Admitting Diagnosis: Vertebral artery dissection, seizure    Subjective:   Pt seen and Chart reviewed. Patient improved this a.m. Continues to have headaches and neck stiffness intermittently. Some tingling numbness in the upper extremity persisting but improved. VS stable, Afebrile. Patient had some increased blood sugars overnight due to insulin pump was not working properly. Adjustments made for proper functioning of the insulin pump. We will reobtain blood sugars and follow accordingly. Eating, ambulating, working with PT. Will wait for input from neurology prior to discharge. Intake/Output Summary (Last 24 hours) at 4/21/2019 0834  Last data filed at 4/20/2019 2310  Gross per 24 hour   Intake 1000 ml   Output --   Net 1000 ml         REVIEW OF SYSTEMS:  · Constitutional: Negative for Fever, chills  · Eyes: Negative for visual changes, diplopia  · ENT: Negative for mouth sores, sore throat. · Cardiovascular: Negative for lightheadedness ,chest pain, palpitations   · Respiratory:Negative for Shortness of breath,cough or wheezing. · Gastrointestinal: Negative for nausea/vomiting, change in bowel habits, abdominal pain  · Genitourinary:Negative for change in bladder habits, dysuria, hematuria.   · Musculoskeletal: Negative for joint pain   · Neurological: Positive for headache, change in muscle strength numbness/tingling  Objective:   BP 92/61 Comment: Pt sleeping  Pulse 58   Temp 97.7 °F (36.5 °C) (Oral)   Resp 14   Ht 6' 1\" (1.854 m)   Wt 214 lb (97.1 kg)   SpO2 97%   BMI 28.23 kg/m²       Intake/Output Summary (Last 24 hours) at 4/21/2019 0834  Last data filed at 4/20/2019 2310  Gross per 24 hour   Intake 1000 ml   Output --   Net 1000 ml · General appearance: awake, alert, cooperative  · HEENT: Head: Normocephalic, no lesions, without obvious abnormality. · Lungs: clear to auscultation bilaterally  · Heart: regular rate and rhythm, S1, S2 normal, no murmur  · Abdomen: soft, non-tender; bowel sounds normal; no masses,  no organomegaly  · Extremities: extremities normal, atraumatic, no cyanosis or edema  · Neurological:  Awake, alert, oriented to name, place and time. Cranial nerves II-XII are grossly intact. Reflexes normal and symmetric. Sensation grossly normal  · Eye no icterus no redness    Medications:   MEDICATIONS:  Prior to Admission medications    Medication Sig Start Date End Date Taking? Authorizing Provider   KEPPRA 250 MG tablet Take 3 tablets by mouth 2 times daily 2/8/19 3/10/19  Agustin Carrizales MD   naproxen (NAPROSYN) 375 MG tablet Take 1 tablet by mouth 2 times daily 1/21/19   Bryan Reeder MD   aspirin (ECPIRIN) 325 MG EC tablet Take 1 tablet by mouth daily 1/17/19   Niki [place] MD   atorvastatin (LIPITOR) 40 MG tablet Take 1 tablet by mouth nightly 1/17/19   Niki [place] MD   famotidine (PEPCID) 20 MG tablet Take 1 tablet by mouth 2 times daily 1/17/19   Niki [place] MD   ALPRAZolam Lovina Cocking) 0.25 MG tablet Take 0.25 mg by mouth nightly as needed for Sleep. Teresa Malin Historical Provider, MD   sertraline (ZOLOFT) 100 MG tablet Take 200 mg by mouth nightly     Historical Provider, MD   ondansetron (ZOFRAN) 4 MG tablet Take 1 tablet by mouth every 4 hours as needed for Nausea or Vomiting 2/15/18   Frantz Wilson MD   albuterol sulfate  (90 BASE) MCG/ACT inhaler Inhale 2 puffs into the lungs every 6 hours as needed for Wheezing    Historical Provider, MD   oxyCODONE-acetaminophen (PERCOCET) 5-325 MG per tablet Take 1 tablet by mouth once.     Historical Provider, MD   insulin lispro (HUMALOG) 100 UNIT/ML injection vial Take as directed 9/4/14   Eduardo Martin MD   ibuprofen (ADVIL;MOTRIN) 800 MG tablet Take 800 mg by mouth every 6 hours as needed for Pain. Historical Provider, MD   diphenhydrAMINE (BENADRYL) 50 MG capsule Take 50 mg by mouth nightly as needed. Historical Provider, MD   insulin lispro (HUMALOG) 100 UNIT/ML injection Inject  into the skin. Insulin pump     Historical Provider, MD     Scheduled Meds:   sodium chloride flush  10 mL Intravenous 2 times per day    aspirin  325 mg Oral Daily    atorvastatin  40 mg Oral Nightly    levETIRAcetam  750 mg Oral BID    folic acid  1 mg Oral BID    enoxaparin  40 mg Subcutaneous Daily    sodium chloride flush  10 mL Intravenous BID     Continuous Infusions:   sodium chloride 75 mL/hr at 04/21/19 0427    Insulin Pump - insulin lispro      dextrose       PRN Meds:     sodium chloride flush 10 mL PRN   acetaminophen 650 mg Q4H PRN   sodium chloride flush 10 mL PRN   magnesium hydroxide 30 mL Daily PRN   ondansetron 4 mg Q6H PRN   glucose 15 g PRN   dextrose 12.5 g PRN   glucagon (rDNA) 1 mg PRN   dextrose 100 mL/hr PRN             LABS:  CBC: Recent Labs     04/20/19 2127   WBC 8.3   RBC 4.61   HGB 12.9   HCT 40.8   MCV 88.5   RDW 12.8        BMP: Recent Labs     04/20/19  1625 04/20/19 2127    138   K 4.3 4.1   CL 98 103   CO2 25 23   BUN 8 6   CREATININE 0.67 0.48*     PT/INR:   Recent Labs     04/20/19 2127   PROTIME 10.2   INR 1.0     FASTING LIPID PANEL:  Lab Results   Component Value Date    CHOL 145 01/17/2019    HDL 41 01/17/2019    TRIG 148 01/17/2019       IMAGING:      ASSESSMENT:     Active Problems:    Seizures ([facility]    Type 1 diabetes mellitus on insulin therapy ([facility]    Vertebral artery dissection (HCC)    History of seizure    Numbness  Resolved Problems:    * No resolved hospital problems. *         PLAN:       1. Concern for Vertebral artery dissection. Per imaging/MRI focal marrow edema and enhancement in a portion of 52 vertebral body with indeterminate lesion. Obtain further input from neurology regarding the same. Continue aspirin 059 daily, folic acid 1 mg twice a day, Lipitor 40 mg nightly. Fasting lipid panel within normal limit. Patient to work with PT OT, speech. Echo pending. Continue hydration with IV normal saline at 75 ML per hour. Telemetry monitoring. Neuro checks per protocol. Keep systolic blood pressure less than 140 and that glucose less than 180.       2. Concern for seizure. No new seizure activity this admission. Aspiration precautions. Fall precautions. EEG monitoring as needed. Continue Keppra 750 mg twice daily. Will obtain Keppra levels as needed. Neurology on board.     3. History of diabetes mellitus on insulin therapy. Patient uses insulin pump at home, 36 units of basal insulin with pre-meal insulin as per calorie count. Point-of-care glucose checks before meals and at bedtime. Medium dose sliding scale for now. Ensure proper working of insulin pump. DVT prophylaxis: EPC cuffs. Diet: Gen. diet  PT/OT evaluation  Disposition: MedSurg/stepdown. Pedrito Hernadez M.D. Internal Medicine Resident , PGY-1  [facility]  04/21/19       Attending Physician Statement  I have discussed the care of Armida Gould, including pertinent history and exam findings with the resident. I have reviewed the key elements of all parts of the encounter with the resident. I have seen and examined the patient with the resident and the key elements of all parts of the encounter have been performed by me.         Maddi Price MD  Attending and Faculty Internal Medicine  [facility]  Internal Medicine [address] [place] [address], [facility]   4/21/19

## 2019-04-21 NOTE — PROGRESS NOTES
Speech Language Pathology  Facility/Department: [facility]  Initial Speech/Language/Cognitive Assessment    NAME: Lyn Reyes  : 1975   MRN: [id number]  ADMISSION DATE: 2019  ADMITTING DIAGNOSIS: has Pleurisy; Bone lesion; Seizures ([facility] Tobacco abuse disorder; Type 1 diabetes mellitus on insulin therapy ([facility] Insulin pump in place; Localization-related epilepsy ([facility] Vertebral artery dissection ([facility] Acute ischemic vertebrobasilar artery cerebellar stroke ([facility] History of seizure; Chronic intractable headache; and Numbness on their problem list.    Date of Eval: 2019   Evaluating Therapist: YONAS Daugherty      Primary Complaint: Lyn Reyes is a 37 y.o. female who presents as a transfer from [facility]. Patient reports that she started having neck pain about 3 days ago, 6 to 8 hours ago she started having bilateral finger numbness and tingling and a mild generalized headache that started gradually. Patient denies any recent trauma, denies any recent medications of the neck. She denies any chest pain, denies abdominal pain, denies any back pain, denies any weakness in extremities, denies any facial numbness or weakness. Patient reports that in January she was told that she had a left-sided dissection of a vertebral artery. Pain:  Pain Assessment  Pain Level: 6    Assessment:   Pt presents with mild cognitive deficits characterized by impaired orientation, recall, and deductive reasoning. Note pt lethargic during evaluation. ST to follow up and provide treatment to address noted deficits. Education provided. Further therapy recommended at discharge. Recommendations:  Requires SLP Intervention: Yes  Duration/Frequency of Treatment: 3-5x/week          Plan:   Goals:  Short-term Goals  Goal 1: Pt will recall orientation information with 90% accuracy. Goal 2: Pt will recall 3-5 units with and without distraction with 90% accuracy.   Goal 3: Pt will

## 2019-04-21 NOTE — CONSULTS
NEUROLOGY INPATIENT CONSULT NOTE    4/20/2019         Jose Nevarez is a  37 y.o. female admitted on 4/20/2019 with  No admission diagnoses are documented for this encounter. History is obtained mostly from the patient and the medical record and from the caregivers. Chart is reviewed and patient is examined. Briefly, this is a  37 y.o. female admitted on 4/20/2019 with neck pain, headache, numbness and weakness in right upper extremity. Patient has past medical history of seizures, type 1 diabetes on insulin pump, cervical cancer status post bilateral salpingo-oophorectomy and total hysterectomy presented with neck pain for the last 3 days. The neck pain is worsening and constant. Patient is also complaining of intermittent dizziness. Patient is also complaining of headache and out of 10 in intensity occipital region more towards left. For the last 1 day. No aggravating or relieving factors. .  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 Complaining of photophobia, phonophobia. Patient has history of migraines she is on ibuprofen. She also states that having recurrent seizures.  States that her first seizure was in February last year, sometimes she has frequent seizures every day, sometimes associated with aura of burning smell, goosebumps, palpitations, respiratory distress and stares blankly for seconds or minutes.  She also states that sometimes she has  generalized tonic-clonic seizures associated with postictal confusion.  But denies fecal incontinence, urinary incontinence. Patient was started on Keppra 750 twice a day the last admission. The patient stating that she is seizure free since then. Patient is compliant with her medications.   Home medications patient is on aspirin 325 daily, Keppra 750 twice a day, Percocet, sertraline, Xanax, naproxen. EEG 9/29/2018  These findings are indicative of mild focal cerebral dysfunction   in the right frontotemporal region but are not specific as to   etiology. These findings do place patient at risk for focal onset   seizures. There were no clinical or electrographic seizures   Recorded. No current facility-administered medications on file prior to encounter. Current Outpatient Medications on File Prior to Encounter   Medication Sig Dispense Refill    KEPPRA 250 MG tablet Take 3 tablets by mouth 2 times daily 180 tablet 3    naproxen (NAPROSYN) 375 MG tablet Take 1 tablet by mouth 2 times daily 30 tablet 0    aspirin (ECPIRIN) 325 MG EC tablet Take 1 tablet by mouth daily 30 tablet 3    atorvastatin (LIPITOR) 40 MG tablet Take 1 tablet by mouth nightly 30 tablet 3    famotidine (PEPCID) 20 MG tablet Take 1 tablet by mouth 2 times daily 60 tablet 3    ALPRAZolam (XANAX) 0.25 MG tablet Take 0.25 mg by mouth nightly as needed for Sleep. [facility] sertraline (ZOLOFT) 100 MG tablet Take 200 mg by mouth nightly       ondansetron (ZOFRAN) 4 MG tablet Take 1 tablet by mouth every 4 hours as needed for Nausea or Vomiting 15 tablet 0    albuterol sulfate  (90 BASE) MCG/ACT inhaler Inhale 2 puffs into the lungs every 6 hours as needed for Wheezing      oxyCODONE-acetaminophen (PERCOCET) 5-325 MG per tablet Take 1 tablet by mouth once.  insulin lispro (HUMALOG) 100 UNIT/ML injection vial Take as directed 1 vial 3    ibuprofen (ADVIL;MOTRIN) 800 MG tablet Take 800 mg by mouth every 6 hours as needed for Pain.  diphenhydrAMINE (BENADRYL) 50 MG capsule Take 50 mg by mouth nightly as needed.  insulin lispro (HUMALOG) 100 UNIT/ML injection Inject  into the skin. Insulin pump        Allergies: Osmar Millan is allergic to bee venom; other; and tape [adhesive tape].     Past Medical History:   Diagnosis Date    Bronchitis     with RAD (seen in [place] ER 3 days ago)    Cancer [facility]) cervical cancer at age 22    Diabetes mellitus ([facility] [address]     Headache(784.0)     Migraines    Seizures ([facility] [address]     Type II or unspecified type diabetes mellitus without mention of complication, not stated as uncontrolled        Past Surgical History:   Procedure Laterality Date    DENTAL SURGERY      x4    HYSTERECTOMY      KNEE CARTILAGE SURGERY Left     TUBAL LIGATION       Social History: Varsha Watson  reports that she has been smoking cigarettes. She has a 6.25 pack-year smoking history. She has never used smokeless tobacco. She reports that she does not drink alcohol or use drugs. Family History   Problem Relation Age of Onset    Cancer Father     Stroke Maternal Grandmother     Cancer Paternal Grandfather        Current Medications:     sodium chloride  1,000 mL Intravenous Once     PRN Meds include:     ROS:   Constitutional Negative for fever and chills   HEENT Negative for ear discharge, ear pain, nosebleed   Eyes Negative for photophobia, pain and discharge   Respiratory Negative for hemoptysis and sputum   Cardiovascular Negative for orthopnea, claudication and PND   Gastrointestinal Negative for abdominal pain, diarrhea, blood in stool   Musculoskeletal Negative for joint pain, negative for myalgia   Skin Negative for rash or itching   Endo/heme/allergies Negative for polydipsia, environmental allergy   Psychiatric Negative for suicidal ideation.   Patient is not anxious           Objective:   /78   Pulse 70   Temp 98.5 °F (36.9 °C) (Oral)   Resp 16   Ht 6' 1\" (1.854 m)   Wt 200 lb (90.7 kg)   SpO2 99%   BMI 26.39 kg/m²     Blood pressure range: Systolic (32JDR), COW:087 , Min:119 , PVN:736   ; Diastolic (32LSB), ZYN:23, Min:78, Max:87                     EXAMINATION:  GENERAL    SKIN  Appears comfortable and in no distress    No gross lesions, rashes, bruising or bleeding on exposed skin area   HEENT NC/ AT  Eyes:no icterus, redness, pupils equal and reactive, extraocular eye movements intact, conjunctiva clear  Ear: normal external ear, no discharge, hearing intact  Nose:  no drainage noted  Mouth: mucous membranes moist   NECK  LUNGS  Supple, tenderness noted back of the neck. and no bruits heard  Clear to auscultation,b/l   Cardiovascular  Abdomen  S1, S2 heard; radial pulse intact,RRR  Soft,non-tender,no organomegaly,BS+   MENTAL STATUS:  Alert, oriented, intact memory, no confusion, normal speech, normal language, no hallucination or delusion   CRANIAL NERVES: II     -       Pupils reactive b/l visual acuity: 20/30 OU; Fundus exam: intact venous pulsations;  Visual fields intact to confrontation  III,IV,VI -  EOMs full, no afferent defect, no                      MITA, no ptosis  V     -     Normal facial sensation  VII    -     Normal facial symmetry  VIII   -     Intact hearing  IX,X -     Symmetrical palate  XI    -     Symmetrical shoulder shrug  XII   -     Midline tongue, no atrophy    MOTOR FUNCTION:  Bulk R side:Normal            L side:Normal  Tone  R side:Normal            L side:Normal   Power 3/5 in right  UE with +ve drift  and 5/5 LUE  5/5 lower extremties;      no involuntary movements, no tremor     SENSORY FUNCTION:                      Extremities: Touch     R side:Normal                 L side:Normal     Pain        R side:Normal                  L side:Normal  Vibration  R side:Normal                  L side:Normal    Proprioception    R side:Normal                             L side:Normal    Peripheral pulses palpable, no pedal edema or calf pain with palpation   CEREBELLAR FUNCTION:  Heel to Shin:     Right side:  normal                             Left side:  normal    Finger to Nose:   Right:  normal                              Left:  normal            REFLEX FUNCTION:  Symmetric, no perverted reflex,      Right Bicep:  2+  Left Bicep:  2+  Right Knee:  2+  Left Knee:  2+    Babinski sign   Right side:Downgoing                          Left side :Downgoing   STATION and GAIT  Not tested           Data:    Lab Results:     Lab Results   Component Value Date    CHOL 145 01/17/2019    LDLCHOLESTEROL 74 01/17/2019    HDL 41 01/17/2019    TRIG 148 01/17/2019    ALT 9 01/15/2019    AST 19 01/15/2019    TSH 1.63 01/17/2017    INR 1.3 01/15/2019    LABA1C 10.6 (H) 01/16/2019    LABMICR 5 11/01/2016     Hematology:No results for input(s): WBC, HGB, HCT, PLT, SEDRATE, INR in the last 72 hours. Invalid input(s): PT  Chemistry:  Recent Labs     04/20/19  1625      K 4.3   CL 98   CO2 25   GLUCOSE 326*   BUN 8   CREATININE 0.67   CALCIUM 9.0     No results for input(s): PROT, LABALBU, LABA1C, W9FEYFT, L1IADND, FT4, TSH, AST, ALT, LDH, AMMONIA, CHOL, HDL, LDLCHOLESTEROL, CHOLHDLRATIO, TRIG, VLDL, CKTOTAL, CKMB, CKMBINDEX, RF, LORETTA in the last 72 hours. No results found for: PHENYTOIN, PHENYTOIN, VALPROATE, CBMZ        Diagnostic data reviewed:      Impression and Plan:      Ms. Valentin Forrest is a 37 y.o. 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  - CTH WO    - CTA H/N     - MRI Brain WO contrast                           - MRI Cervical spine    -  daily    - Folic acid 1mg BID    - Lipitor 40mg nightly     - recommend getting Fasting Lipid panel    - PT, OT, Speech eval     - Hydrate with 500cc bolus then IVF NS @ 75cc/hr                           - check B12 levels    - Telemetry     - Neuro checks per protocol   - We recommend SBP less than 140   - Blood glucose goal less than 180   - Please avoid dextrose containing solution               - Caution in using NSAID's. Discussed with patient and Nurse. Will follow with you. Thank you for consultation.          Hermelindo Kelly MD Pager: [phone]  Electronically signed 4/20/2019 at 9:32 PM

## 2019-04-21 NOTE — FLOWSHEET NOTE
Pt glucose elevated this shift. Prior to breakfast was 257- pt has insulin pump that has continuous infusion of 36 units/24 hours. Pt states that she gave herself coverage before breakfast. States that for MRI her insulin pump was disconnected, and wasn't reconnected properly until closer to 0600. States pump should be working well at this time. Prior to lunch meal FSBS elevated to 405. Again pt states she manages her glucose via insulin pump, states she gave herself 6.5 units at that reading. Perfect serve messages to update Dr Osmani Conway and Dr Faraz Muniz. Declines offers from physician and nursing staff for staff management of glucose with SQ sliding scale coverage. Upon review of older charts, found Hgb A1C in January 2018 reading 10.6 and elevated POC glucose checks on that hospitalization at [facility]. Continue to monitor. 1615-evening blood sugar check= 353. Pt states she will get 4.1 units via insulin pump.

## 2019-04-21 NOTE — H&P
[facility]     Department of Internal Medicine - Staff Internal Medicine Service          ADMISSION NOTE/HISTORY AND PHYSICAL EXAMINATION       CHIEF COMPLAINT:    Chief Complaint   Patient presents with    Numbness     R arm and bilateral finger tips, started 6 hours ago    Neck Pain     x3 days, getting worse, denies injury       HISTORY OBTAIN FROM:  Patient     HISTORY OF PRESENT ILLNESS:      The patient is a pleasant 37 y.o. female who presented to the emergency department with complaints of neck pain of 4 days' duration and headache and numbness of 7-10 hours duration. Patient stated that the neck pain sudden in onset, woke up the patient from sleep, was around 8 on 10 in intensity, piercing, located at the back of the neck and slightly radiating to the back of the shoulder, not relieved with pain medication, not related to postural changes/improper positioning during sleep. Patient stated that this was not accompanied with any fever, only chills and profound nausea but no vomiting. Patient denied any history of falls, head trauma, neck trauma. Patient stated that this has been accompanied with numbness more in the right arm than the left and bilateral fingertips which started around 6-7 hours prior to admission to the hospital.  Patient stated that there was no difficulty in lifting objects, no change in strength, no altered sensations. Patient stated that she also is having headaches 8 on 10 in intensity, sudden onset, duration since last 6-7 hours which are constricting/throbbing localized on the back and the side of the head. Patient stated that it did not feel like the worst headache of her life/thunderclap. Patient stated that she had similar pain back in January 16, 2019 which was accompanied with stroke.   Patient stated that this time the headache was worse as compared to the previous occasion which prompted her to come to the hospital.  Patient stated that she has were found to be stable, MRI of the brain and spine was performed, neurology was consulted, patient was admitted to medicine service for generalized medical management including history of diabetes. Medical History:   Past Medical History:   Diagnosis Date    Bronchitis     with RAD (seen in [place] ER 3 days ago)    Cancer [facility])     cervical cancer at age 22    Diabetes mellitus ([facility] [address]     Headache(784.0)     Migraines    Seizures ([facility] [address]     Type II or unspecified type diabetes mellitus without mention of complication, not stated as uncontrolled        SURGICAL HISTORY:  Past Surgical History:   Procedure Laterality Date    DENTAL SURGERY      x4    HYSTERECTOMY      KNEE CARTILAGE SURGERY Left     TUBAL LIGATION         MEDS:  Prior to Admission medications    Medication Sig Start Date End Date Taking? Authorizing Provider   KEPPRA 250 MG tablet Take 3 tablets by mouth 2 times daily 2/8/19 3/10/19  Junior Champagne MD   naproxen (NAPROSYN) 375 MG tablet Take 1 tablet by mouth 2 times daily 1/21/19   Samanta Ramos MD   aspirin (ECPIRIN) 325 MG EC tablet Take 1 tablet by mouth daily 1/17/19   Ellen Canales MD   atorvastatin (LIPITOR) 40 MG tablet Take 1 tablet by mouth nightly 1/17/19   Ellen Canales MD   famotidine (PEPCID) 20 MG tablet Take 1 tablet by mouth 2 times daily 1/17/19   Ellen Canales MD   ALPRAZolam Kori Levo) 0.25 MG tablet Take 0.25 mg by mouth nightly as needed for Sleep. Raven Avelar Historical Provider, MD   sertraline (ZOLOFT) 100 MG tablet Take 200 mg by mouth nightly     Historical Provider, MD   ondansetron (ZOFRAN) 4 MG tablet Take 1 tablet by mouth every 4 hours as needed for Nausea or Vomiting 2/15/18   Garry Rico MD   albuterol sulfate  (90 BASE) MCG/ACT inhaler Inhale 2 puffs into the lungs every 6 hours as needed for Wheezing    Historical Provider, MD   oxyCODONE-acetaminophen (PERCOCET) 5-325 MG per tablet Take 1 tablet by mouth once.     Historical Provider, MD   insulin lispro (HUMALOG) 100 UNIT/ML injection vial Take as directed 9/4/14   Nima Woodward MD   ibuprofen (ADVIL;MOTRIN) 800 MG tablet Take 800 mg by mouth every 6 hours as needed for Pain. Historical Provider, MD   diphenhydrAMINE (BENADRYL) 50 MG capsule Take 50 mg by mouth nightly as needed. Historical Provider, MD   insulin lispro (HUMALOG) 100 UNIT/ML injection Inject  into the skin. Insulin pump     Historical Provider, MD     Scheduled Meds:  Continuous Infusions:  PRN Meds:    Allergies   Allergen Reactions    Bee Venom Hives    Other Rash     Bandaids  Artificial Sweetners    Tape Ceclia Ishihara Tape] Rash       SOCIAL HISTORY:   Social History     Socioeconomic History    Marital status:      Spouse name: Not on file    Number of children: Not on file    Years of education: Not on file    Highest education level: Not on file   Occupational History    Not on file   Social Needs    Financial resource strain: Not on file    Food insecurity:     Worry: Not on file     Inability: Not on file    Transportation needs:     Medical: Not on file     Non-medical: Not on file   Tobacco Use    Smoking status: Current Every Day Smoker     Packs/day: 0.25     Years: 25.00     Pack years: 6.25     Types: Cigarettes    Smokeless tobacco: Never Used    Tobacco comment: 5 packs a month   Substance and Sexual Activity    Alcohol use: No     Comment:  Occ    Drug use: No    Sexual activity: Yes   Lifestyle    Physical activity:     Days per week: Not on file     Minutes per session: Not on file    Stress: Not on file   Relationships    Social connections:     Talks on phone: Not on file     Gets together: Not on file     Attends Synagogue service: Not on file     Active member of club or organization: Not on file     Attends meetings of clubs or organizations: Not on file     Relationship status: Not on file    Intimate partner violence:     Fear of current or ex partner: Not on file     Emotionally abused: Not on file     Physically abused: Not on file     Forced sexual activity: Not on file   Other Topics Concern    Not on file   Social History Narrative    Not on file       FAMILY HISTORY:   Family History   Problem Relation Age of Onset    Cancer Father     Stroke Maternal Grandmother     Cancer Paternal Grandfather        REVIEW OF SYSTEMS:  · Constitutional: Negative for Fever, chills. Positive for headaches  · Eyes: Positive for visual aura. · ENT: Negative for mouth sores, sore throat. · Cardiovascular: Negative for lightheadedness ,chest pain, palpitations   · Respiratory:Negative for Shortness of breath,cough or wheezing. · Gastrointestinal: Negative for nausea/vomiting, change in bowel habits, abdominal pain  · Genitourinary:Negative for change in bladder habits, dysuria, hematuria. · Musculoskeletal: Negative for joint pain   · Neurological: Positive for headache, change in muscle strength numbness/tingling  · Psychiatric: negative for change in mood, affect      PHYSICAL EXAM:  /78   Pulse 70   Temp 98.5 °F (36.9 °C) (Oral)   Resp 16   Ht 6' 1\" (1.854 m)   Wt 200 lb (90.7 kg)   SpO2 99%   BMI 26.39 kg/m²    Wt Readings from Last 3 Encounters:   04/20/19 200 lb (90.7 kg)   02/08/19 195 lb (88.5 kg)   01/21/19 221 lb 12.5 oz (100.6 kg)       Physical Exam   Constitutional: She is oriented to person, place, and time. She appears well-developed and well-nourished. She appears distressed. HENT:   Head: Normocephalic. Eyes: Pupils are equal, round, and reactive to light. Neck: Normal range of motion. No thyromegaly present. Cardiovascular: Normal rate, regular rhythm, normal heart sounds and intact distal pulses. Exam reveals no gallop and no friction rub. No murmur heard. Pulmonary/Chest: Effort normal and breath sounds normal. No respiratory distress. Abdominal: Soft. Bowel sounds are normal. She exhibits no distension. There is no tenderness.    Musculoskeletal: Normal range of motion. Lymphadenopathy:     She has no cervical adenopathy. Neurological: She is alert and oriented to person, place, and time. Tingling numbness, bilaterally the upper extremities, right more than left. Skin: Skin is warm. Capillary refill takes less than 2 seconds. She is not diaphoretic. Psychiatric: She has a normal mood and affect. Her behavior is normal. Judgment and thought content normal.       LABS:  CBC:   Recent Labs     04/20/19 2127   WBC 8.3   RBC 4.61   HGB 12.9   HCT 40.8   MCV 88.5   RDW 12.8        BMP:   Recent Labs     04/20/19  1625 04/20/19 2127    138   K 4.3 4.1   CL 98 103   CO2 25 23   BUN 8 6   CREATININE 0.67 0.48*     PT/INR:   Recent Labs     04/20/19 2127   PROTIME 10.2   INR 1.0     FASTING LIPID PANEL:  Lab Results   Component Value Date    CHOL 145 01/17/2019    HDL 41 01/17/2019    TRIG 148 01/17/2019       ASSESSMENT:     Active Problems:    Seizures ([facility] [address]    Type 1 diabetes mellitus on insulin therapy ([facility] [address]    Vertebral artery dissection ([facility])    History of seizure    Numbness  Resolved Problems:    * No resolved hospital problems. *        PLAN:     Vertebral artery dissection. 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 Obtain CT head/neck, MRI brain without contrast, MRI cervical spine. Continue aspirin 760 daily, folic acid 1 mg twice a day, Lipitor 40 mg nightly. Obtain fasting lipid panel, PT, OT, speech eval, echo. Continue hydration with IV normal saline at 75 ML per hour. Telemetry monitoring. Neuro checks per protocol. Keep systolic blood pressure less than 140 and that glucose less than 180. Neurology on board    Concern for seizure. Aspiration precautions. Fall precautions. EEG monitoring as needed. Continue Keppra 750 mg twice daily. Will obtain Keppra levels as needed. Neurology on board.     History of diabetes

## 2019-04-21 NOTE — ED NOTES
Bed: 11  Expected date:   Expected time:   Means of arrival:   Comments:  [facility] transfer      Lin Goodman RN  04/20/19 2057

## 2019-04-21 NOTE — FLOWSHEET NOTE
Writer rec'd call from radiology requesting hold on administering oral contrast for CT scans at this time, will call back when department ready for contrast to be administered.

## 2019-04-21 NOTE — ED NOTES
Pt transferred from [employer] to ED with c/o left sided neck pain/head pain. Pt sts the pain has been going on for 3 days now. Pt also sts she has liana numbness and tingling to arms but can feel me touching her. Pt sts it started 6 hours PTA. Pt sts she's had a stroke in the past. Pt axox4 and in NAD. RR even and unlabored. Pt has a 20G IV to right AC. Pt was sent to ED for MRI due to [employer] not having one available. Pt placed on monitor and call light within reach. Will continue to monitor.      Evelio Miller RN  04/21/19 3049

## 2019-04-21 NOTE — PROGRESS NOTES
Physical Therapy  DATE: 2019    NAME: Dorene North  MRN: [id number]   : 1975    Patient not seen this date for Physical Therapy due to:  [] Blood transfusion in progress  [] Hemodialysis  []  Patient Declined  [] Spine Precautions   [] Strict Bedrest  [] Surgery/ Procedure  [] Testing      [x] Other  Pt just admitted to room early AM. Pt sleeping. Will check Pm as time allows. [] PT being discontinued at this time. Patient independent. No further needs. [] PT being discontinued at this time as the patient has been transferred to palliative care. No further needs.     Leslie Sample, PT

## 2019-04-21 NOTE — ED PROVIDER NOTES
[address] [facility]  ED  Emergency Department Encounter  EmergencyMedicine Resident     Pt Name:Janell Dean Child  MRN: [id number]  Mike 1975  Date of evaluation: 4/20/19  PCP:  Adrian Forte PA-C    CHIEF COMPLAINT       Chief Complaint   Patient presents with    Numbness     R arm and bilateral finger tips, started 6 hours ago    Neck Pain     x3 days, getting worse, denies injury       HISTORY OF PRESENT ILLNESS  (Location/Symptom, Timing/Onset, Context/Setting, Quality, Duration, Modifying Factors, Severity.)      Dimitry Amador is a 37 y.o. female who presents as a transfer from [place]. Patient reports that she started having neck pain about 3 days ago, 6 to 8 hours ago she started having bilateral finger numbness and tingling and a mild generalized headache that started gradually. Patient denies any recent trauma, denies any recent medications of the neck. She denies any chest pain, denies abdominal pain, denies any back pain, denies any weakness in extremities, denies any facial numbness or weakness. Patient reports that in January she was told that she had a left-sided dissection of a vertebral artery. PAST MEDICAL / SURGICAL / SOCIAL / FAMILY HISTORY      has a past medical history of Bronchitis, Cancer ([address] Diabetes mellitus ([address] Headache(784.0), Seizures ([address] and Type II or unspecified type diabetes mellitus without mention of complication, not stated as uncontrolled. has a past surgical history that includes Hysterectomy; Tubal ligation; Knee cartilage surgery (Left); and Dental surgery.     Social History     Socioeconomic History    Marital status:      Spouse name: Not on file    Number of children: Not on file    Years of education: Not on file    Highest education level: Not on file   Occupational History    Not on file   Social Needs    Financial resource strain: Not on file    Food insecurity:     Worry: Not on file     Inability: Not on file   [employer] needs:     Medical: Not on file     Non-medical: Not on file   Tobacco Use    Smoking status: Current Every Day Smoker     Packs/day: 0.25     Years: 25.00     Pack years: 6.25     Types: Cigarettes    Smokeless tobacco: Never Used    Tobacco comment: 5 packs a month   Substance and Sexual Activity    Alcohol use: No     Comment: Occ    Drug use: No    Sexual activity: Yes   Lifestyle    Physical activity:     Days per week: Not on file     Minutes per session: Not on file    Stress: Not on file   Relationships    Social connections:     Talks on phone: Not on file     Gets together: Not on file     Attends Gnosticism service: Not on file     Active member of club or organization: Not on file     Attends meetings of clubs or organizations: Not on file     Relationship status: Not on file    Intimate partner violence:     Fear of current or ex partner: Not on file     Emotionally abused: Not on file     Physically abused: Not on file     Forced sexual activity: Not on file   Other Topics Concern    Not on file   Social History Narrative    Not on file       Family History   Problem Relation Age of Onset    Cancer Father     Stroke Maternal Grandmother     Cancer Paternal Grandfather        Allergies:  Bee venom; Other; and Tape [adhesive tape]    Home Medications:  Prior to Admission medications    Medication Sig Start Date End Date Taking?  Authorizing Provider   KEPPRA 250 MG tablet Take 3 tablets by mouth 2 times daily 2/8/19 3/10/19  Arline David MD   naproxen (NAPROSYN) 375 MG tablet Take 1 tablet by mouth 2 times daily 1/21/19   Rigoberto Scott MD   aspirin (ECPIRIN) 325 MG EC tablet Take 1 tablet by mouth daily 1/17/19   Ramo Onofre MD   atorvastatin (LIPITOR) 40 MG tablet Take 1 tablet by mouth nightly 1/17/19   Ramo Onofre MD   famotidine (PEPCID) 20 MG tablet Take 1 tablet by mouth 2 times daily 1/17/19   Ramo Onofre MD   ALPRAZolam [place]) 0.25 MG tablet Take 0.25 mg by mouth nightly as needed for Sleep. Fifi Hart Historical Provider, MD   sertraline (ZOLOFT) 100 MG tablet Take 200 mg by mouth nightly     Historical Provider, MD   ondansetron (ZOFRAN) 4 MG tablet Take 1 tablet by mouth every 4 hours as needed for Nausea or Vomiting 2/15/18   Ayanna Gross MD   albuterol sulfate  (90 BASE) MCG/ACT inhaler Inhale 2 puffs into the lungs every 6 hours as needed for Wheezing    Historical Provider, MD   oxyCODONE-acetaminophen (PERCOCET) 5-325 MG per tablet Take 1 tablet by mouth once. Historical Provider, MD   insulin lispro (HUMALOG) 100 UNIT/ML injection vial Take as directed 9/4/14   Yamel Daine MD   ibuprofen (ADVIL;MOTRIN) 800 MG tablet Take 800 mg by mouth every 6 hours as needed for Pain. Historical Provider, MD   diphenhydrAMINE (BENADRYL) 50 MG capsule Take 50 mg by mouth nightly as needed. Historical Provider, MD   insulin lispro (HUMALOG) 100 UNIT/ML injection Inject  into the skin. Insulin pump     Historical Provider, MD       REVIEW OF SYSTEMS    (2-9 systems for level 4, 10 or more for level 5)      Review of Systems   Constitutional: Negative. HENT: Negative. Eyes: Negative. Respiratory: Negative. Cardiovascular: Negative. Gastrointestinal: Negative. Endocrine: Negative. Genitourinary: Negative. Musculoskeletal: Positive for neck pain. Skin: Negative. Allergic/Immunologic: Negative. Neurological: Positive for numbness and headaches. Hematological: Negative. Psychiatric/Behavioral: Negative. PHYSICAL EXAM   (up to 7 for level 4, 8 or more for level 5)      INITIAL VITALS:   /78   Pulse 70   Temp 98.5 °F (36.9 °C) (Oral)   Resp 16   Ht 6' 1\" (1.854 m)   Wt 200 lb (90.7 kg)   SpO2 99%   BMI 26.39 kg/m²     Physical Exam   Constitutional: She is oriented to person, place, and time. HENT:   Head: Normocephalic and atraumatic.    Right Ear: External ear normal.   Left Ear: External ear normal.   Eyes: Pupils are equal, round, and reactive to light. EOM are normal. Right eye exhibits no discharge. Left eye exhibits no discharge. Neck: Neck supple. No JVD present. No tracheal deviation present. Cardiovascular: Normal rate, regular rhythm and normal heart sounds. Exam reveals no gallop and no friction rub. No murmur heard. Pulmonary/Chest: No respiratory distress. She has no wheezes. She exhibits no tenderness. Abdominal: Soft. Bowel sounds are normal. She exhibits no distension. There is no tenderness. There is no guarding. Musculoskeletal: Normal range of motion. She exhibits no edema or deformity. Lymphadenopathy:     She has no cervical adenopathy. Neurological: She is alert and oriented to person, place, and time. A sensory deficit is present. No cranial nerve deficit. She exhibits normal muscle tone. GCS eye subscore is 4. GCS verbal subscore is 5. GCS motor subscore is 6. Skin: Skin is warm and dry. No rash noted. She is not diaphoretic. Nursing note and vitals reviewed.       DIFFERENTIAL  DIAGNOSIS     PLAN (LABS / IMAGING / EKG):  Orders Placed This Encounter   Procedures    MRI CERVICAL SPINE W WO CONTRAST    MRI Brain WO Contrast    CBC WITH AUTO DIFFERENTIAL    BASIC METABOLIC PANEL    MAGNESIUM    Troponin    Protime-INR    Inpatient consult to Neurology    Inpatient consult to Internal Medicine    EKG 12 Lead    PATIENT STATUS (FROM ED OR OR/PROCEDURAL) Inpatient       MEDICATIONS ORDERED:  Orders Placed This Encounter   Medications    dexamethasone (DECADRON) injection 10 mg    metoclopramide (REGLAN) injection 10 mg    0.9 % sodium chloride bolus       DIAGNOSTIC RESULTS / EMERGENCY DEPARTMENT COURSE / MDM     LABS:  Results for orders placed or performed during the hospital encounter of 04/20/19   CBC WITH AUTO DIFFERENTIAL   Result Value Ref Range    WBC 8.3 3.5 - 11.3 k/uL    RBC 4.61 3.95 - 5.11 m/uL    Hemoglobin 12.9 11.9 - 15.1 g/dL Hematocrit 40.8 36.3 - 47.1 %    MCV 88.5 82.6 - 102.9 fL    MCH 28.0 25.2 - 33.5 pg    MCHC 31.6 28.4 - 34.8 g/dL    RDW 12.8 11.8 - 14.4 %    Platelets 298 338 - 506 k/uL    MPV 10.3 8.1 - 13.5 fL    NRBC Automated 0.0 0.0 per 100 WBC    Differential Type NOT REPORTED     Seg Neutrophils 55 36 - 65 %    Lymphocytes 36 24 - 43 %    Monocytes 7 3 - 12 %    Eosinophils % 1 1 - 4 %    Basophils 1 0 - 2 %    Immature Granulocytes 0 0 %    Segs Absolute 4.56 1.50 - 8.10 k/uL    Absolute Lymph # 2.96 1.10 - 3.70 k/uL    Absolute Mono # 0.59 0.10 - 1.20 k/uL    Absolute Eos # 0.10 0.00 - 0.44 k/uL    Basophils # 0.07 0.00 - 0.20 k/uL    Absolute Immature Granulocyte <0.03 0.00 - 0.30 k/uL    WBC Morphology NOT REPORTED     RBC Morphology NOT REPORTED     Platelet Estimate NOT REPORTED    BASIC METABOLIC PANEL   Result Value Ref Range    Glucose 146 (H) 70 - 99 mg/dL    BUN 6 6 - 20 mg/dL    CREATININE 0.48 (L) 0.50 - 0.90 mg/dL    Bun/Cre Ratio NOT REPORTED 9 - 20    Calcium 9.0 8.6 - 10.4 mg/dL    Sodium 138 135 - 144 mmol/L    Potassium 4.1 3.7 - 5.3 mmol/L    Chloride 103 98 - 107 mmol/L    CO2 23 20 - 31 mmol/L    Anion Gap 12 9 - 17 mmol/L    GFR Non-African American >60 >60 mL/min    GFR African American >60 >60 mL/min    GFR Comment          GFR Staging NOT REPORTED    MAGNESIUM   Result Value Ref Range    Magnesium 2.2 1.6 - 2.6 mg/dL   Troponin   Result Value Ref Range    Troponin, High Sensitivity <6 0 - 14 ng/L    Troponin T NOT REPORTED <0.03 ng/mL    Troponin Interp NOT REPORTED    Protime-INR   Result Value Ref Range    Protime 10.2 9.0 - 12.0 sec    INR 1.0        IMPRESSION: Patient was transferred to our facility from her significant with complaints of 3 day history of midline neck pain, 6-8 hour history of mild generalized headache, numbness and tingling in both fingers. Patient denies any recent trauma, denies any fevers or chills, denies any vision changes, denies any weakness.   Vital signs unremarkable. On physical exam, patient has normal heart sounds, normal lung sounds and abdomen is soft, nontender, nondistended, no rigidity, and there are no focal neuro deficits on exam except for patient complaining of numbness to the fingers. CTA of the head and neck was concerning for a possible dissection along the distal segment of the left vertebral artery which patient states she was aware of since January. We will get bloodwork and get neurology involved. NATHAN juliusrachael also order MRI studies of the cervical spine. RADIOLOGY:  None    EKG  EKG Interpretation    Rhythm: normal sinus   Rate: normal  Axis: normal  Conduction: normal  ST Segments: normal  T Waves: normal  Q Waves: normal    Clinical Impression: Normal EKG. Elli Leach MD      All EKG's are interpreted by the Emergency Department Physician who either signs or Co-signs this chart in the absence of a cardiologist.    PROCEDURES:  None    CONSULTS:  IP CONSULT TO NEUROLOGY  IP CONSULT TO INTERNAL MEDICINE    CRITICAL CARE:  None    FINAL IMPRESSION      1. Nonintractable headache, unspecified chronicity pattern, unspecified headache type    2.  Numbness          DISPOSITION / PLAN     DISPOSITION        [facility]:  [facility], [address] [place] [address]  [phone]            DISCHARGE MEDICATIONS:  New Prescriptions    No medications on file       Elli Leach MD  Emergency Medicine Resident    (Please note that portions of thisnote were completed with a voice recognition program.  Efforts were made to edit the dictations but occasionally words are mis-transcribed.)        Elli Leach MD  Resident  04/20/19 3377

## 2019-04-21 NOTE — PROGRESS NOTES
Nutrition Assessment    Type and Reason for Visit: Initial, Positive Nutrition Screen(Weight Loss, Poor Intakes)    Nutrition Recommendations: Continue current diet. Encourage/monitor intakes as tolerated. Will provide Diabetic diet education as able. Nutrition Assessment: Pt with c/o numbness and neck pain. Reports weight loss over past few months - per EHR weight loss of 3.2% per EHR review - not significant. Pt tolerating oral diet without issues and consuming more than 75% of her meals. Labs reviewed - blood sugars remain elevated and fluctuating. Will provide diabetic diet education as able. Malnutrition Assessment:  · Malnutrition Status: No malnutrition  · Context: Acute on Chronic  · Findings of the 6 clinical characteristics of malnutrition (Minimum of 2 out of 6 clinical characteristics is required to make the diagnosis of moderate or severe Protein Calorie Malnutrition based on AND/ASPEN Guidelines):  1. Energy Intake-(Meeting more than 75% of estimated nutrition needs.)   2. Weight Loss-2% loss or greater, in 3 months  3. Fat Loss-No significant subcutaneous fat loss,    4. Muscle Loss-No significant muscle mass loss,    5. Fluid Accumulation-No significant fluid accumulation,      Nutrition Risk Level:  Moderate    Nutrient Needs:  · Estimated Daily Total Kcal: 6645-2274 kcal/day  · Estimated Daily Protein (g): 75-90 gm pro/day    Nutrition Diagnosis:   · Problem: Altered nutrition-related lab values  · Etiology: related to Endocrine dysfunction     Signs and symptoms:  as evidenced by Lab values - elevated blood sugars    Objective Information:  · Wound Type: None  · Current Nutrition Therapies:  · Oral Diet Orders: Carb Control 4 Carbs/Meal   · Oral Diet intake: %  · Oral Nutrition Supplement (ONS) Orders: None  · Anthropometric Measures:  · Ht: 6' 1\" (185.4 cm)   · Current Body Wt: 214 lb (97.1 kg)  · Admission Body Wt: 214 lb (97.1 kg)  · % Weight Change:  ,  3.2% x 3 months - not significant  · Ideal Body Wt: 166 lb 3.6 oz (75.4 kg), % Ideal Body 129%  · BMI Classification: BMI 25.0 - 29.9 Overweight    Nutrition Interventions:   Continue current diet  Continued Inpatient Monitoring - Review Diabetic Diet as able. Nutrition Evaluation:   · Evaluation: Goals set   · Goals: Oral intakes to meet % of estimated nutrition needs with blood sugars WNL.     · Monitoring: Meal Intake, Diet Tolerance, Weight, Pertinent Labs    Electronically signed by Khalif Serrano RD, LD on 4/21/19 at 3:19 PM    Contact Number: [phone]

## 2019-04-22 ENCOUNTER — APPOINTMENT (OUTPATIENT)
Dept: CT IMAGING | Age: 44
DRG: 058 | End: 2019-04-22
Payer: COMMERCIAL

## 2019-04-22 LAB
ABSOLUTE EOS #: 0.04 K/UL (ref 0–0.44)
ABSOLUTE IMMATURE GRANULOCYTE: 0.03 K/UL (ref 0–0.3)
ABSOLUTE LYMPH #: 3.78 K/UL (ref 1.1–3.7)
ABSOLUTE MONO #: 0.71 K/UL (ref 0.1–1.2)
ANION GAP SERPL CALCULATED.3IONS-SCNC: 10 MMOL/L (ref 9–17)
BASOPHILS # BLD: 0 % (ref 0–2)
BASOPHILS ABSOLUTE: 0.03 K/UL (ref 0–0.2)
BUN BLDV-MCNC: 15 MG/DL (ref 6–20)
BUN/CREAT BLD: ABNORMAL (ref 9–20)
CALCIUM SERPL-MCNC: 8.2 MG/DL (ref 8.6–10.4)
CHLORIDE BLD-SCNC: 101 MMOL/L (ref 98–107)
CO2: 21 MMOL/L (ref 20–31)
CREAT SERPL-MCNC: 0.54 MG/DL (ref 0.5–0.9)
DIFFERENTIAL TYPE: ABNORMAL
EKG ATRIAL RATE: 69 BPM
EKG P AXIS: 19 DEGREES
EKG P-R INTERVAL: 154 MS
EKG Q-T INTERVAL: 440 MS
EKG QRS DURATION: 86 MS
EKG QTC CALCULATION (BAZETT): 471 MS
EKG R AXIS: 73 DEGREES
EKG T AXIS: 46 DEGREES
EKG VENTRICULAR RATE: 69 BPM
EOSINOPHILS RELATIVE PERCENT: 0 % (ref 1–4)
GFR AFRICAN AMERICAN: >60 ML/MIN
GFR NON-AFRICAN AMERICAN: >60 ML/MIN
GFR SERPL CREATININE-BSD FRML MDRD: ABNORMAL ML/MIN/{1.73_M2}
GFR SERPL CREATININE-BSD FRML MDRD: ABNORMAL ML/MIN/{1.73_M2}
GLUCOSE BLD-MCNC: 116 MG/DL (ref 65–105)
GLUCOSE BLD-MCNC: 130 MG/DL (ref 65–105)
GLUCOSE BLD-MCNC: 153 MG/DL (ref 65–105)
GLUCOSE BLD-MCNC: 228 MG/DL (ref 65–105)
GLUCOSE BLD-MCNC: 249 MG/DL (ref 65–105)
GLUCOSE BLD-MCNC: 295 MG/DL (ref 65–105)
GLUCOSE BLD-MCNC: 302 MG/DL (ref 65–105)
GLUCOSE BLD-MCNC: 307 MG/DL (ref 65–105)
GLUCOSE BLD-MCNC: 323 MG/DL (ref 65–105)
GLUCOSE BLD-MCNC: 387 MG/DL (ref 70–99)
GLUCOSE BLD-MCNC: 394 MG/DL (ref 65–105)
GLUCOSE BLD-MCNC: 440 MG/DL (ref 65–105)
GLUCOSE BLD-MCNC: 62 MG/DL (ref 65–105)
HCT VFR BLD CALC: 37.4 % (ref 36.3–47.1)
HEMOGLOBIN: 11.5 G/DL (ref 11.9–15.1)
IMMATURE GRANULOCYTES: 0 %
LYMPHOCYTES # BLD: 41 % (ref 24–43)
MCH RBC QN AUTO: 28 PG (ref 25.2–33.5)
MCHC RBC AUTO-ENTMCNC: 30.7 G/DL (ref 28.4–34.8)
MCV RBC AUTO: 91 FL (ref 82.6–102.9)
MONOCYTES # BLD: 8 % (ref 3–12)
NRBC AUTOMATED: 0 PER 100 WBC
PDW BLD-RTO: 13 % (ref 11.8–14.4)
PLATELET # BLD: 222 K/UL (ref 138–453)
PLATELET ESTIMATE: ABNORMAL
PMV BLD AUTO: 11 FL (ref 8.1–13.5)
POTASSIUM SERPL-SCNC: 4.9 MMOL/L (ref 3.7–5.3)
RBC # BLD: 4.11 M/UL (ref 3.95–5.11)
RBC # BLD: ABNORMAL 10*6/UL
SEG NEUTROPHILS: 51 % (ref 36–65)
SEGMENTED NEUTROPHILS ABSOLUTE COUNT: 4.71 K/UL (ref 1.5–8.1)
SODIUM BLD-SCNC: 132 MMOL/L (ref 135–144)
WBC # BLD: 9.3 K/UL (ref 3.5–11.3)
WBC # BLD: ABNORMAL 10*3/UL

## 2019-04-22 PROCEDURE — 6370000000 HC RX 637 (ALT 250 FOR IP): Performed by: EMERGENCY MEDICINE

## 2019-04-22 PROCEDURE — 6360000004 HC RX CONTRAST MEDICATION: Performed by: REGISTERED NURSE

## 2019-04-22 PROCEDURE — 6370000000 HC RX 637 (ALT 250 FOR IP): Performed by: STUDENT IN AN ORGANIZED HEALTH CARE EDUCATION/TRAINING PROGRAM

## 2019-04-22 PROCEDURE — 99232 SBSQ HOSP IP/OBS MODERATE 35: CPT | Performed by: INTERNAL MEDICINE

## 2019-04-22 PROCEDURE — 80048 BASIC METABOLIC PNL TOTAL CA: CPT

## 2019-04-22 PROCEDURE — 71260 CT THORAX DX C+: CPT

## 2019-04-22 PROCEDURE — 2580000003 HC RX 258: Performed by: STUDENT IN AN ORGANIZED HEALTH CARE EDUCATION/TRAINING PROGRAM

## 2019-04-22 PROCEDURE — 99232 SBSQ HOSP IP/OBS MODERATE 35: CPT | Performed by: PSYCHIATRY & NEUROLOGY

## 2019-04-22 PROCEDURE — G0378 HOSPITAL OBSERVATION PER HR: HCPCS

## 2019-04-22 PROCEDURE — 85025 COMPLETE CBC W/AUTO DIFF WBC: CPT

## 2019-04-22 PROCEDURE — 1200000000 HC SEMI PRIVATE

## 2019-04-22 PROCEDURE — 36415 COLL VENOUS BLD VENIPUNCTURE: CPT

## 2019-04-22 PROCEDURE — 96375 TX/PRO/DX INJ NEW DRUG ADDON: CPT

## 2019-04-22 PROCEDURE — 97165 OT EVAL LOW COMPLEX 30 MIN: CPT

## 2019-04-22 PROCEDURE — G0108 DIAB MANAGE TRN  PER INDIV: HCPCS

## 2019-04-22 PROCEDURE — 97127 HC SP THER IVNTJ W/FOCUS COG FUNCJ: CPT

## 2019-04-22 PROCEDURE — 82947 ASSAY GLUCOSE BLOOD QUANT: CPT

## 2019-04-22 RX ORDER — LANOLIN ALCOHOL/MO/W.PET/CERES
CREAM (GRAM) TOPICAL 2 TIMES DAILY
Status: DISCONTINUED | OUTPATIENT
Start: 2019-04-22 | End: 2019-04-23 | Stop reason: HOSPADM

## 2019-04-22 RX ORDER — INSULIN GLARGINE 100 [IU]/ML
10 INJECTION, SOLUTION SUBCUTANEOUS DAILY
Status: DISCONTINUED | OUTPATIENT
Start: 2019-04-22 | End: 2019-04-23

## 2019-04-22 RX ORDER — 0.9 % SODIUM CHLORIDE 0.9 %
1000 INTRAVENOUS SOLUTION INTRAVENOUS ONCE
Status: COMPLETED | OUTPATIENT
Start: 2019-04-22 | End: 2019-04-23

## 2019-04-22 RX ORDER — INSULIN GLARGINE 100 [IU]/ML
10 INJECTION, SOLUTION SUBCUTANEOUS ONCE
Status: COMPLETED | OUTPATIENT
Start: 2019-04-22 | End: 2019-04-22

## 2019-04-22 RX ADMIN — FOLIC ACID 1 MG: 1 TABLET ORAL at 22:44

## 2019-04-22 RX ADMIN — DEXTROSE MONOHYDRATE 12.5 G: 500 INJECTION PARENTERAL at 14:49

## 2019-04-22 RX ADMIN — INSULIN LISPRO 2 UNITS: 100 INJECTION, SOLUTION INTRAVENOUS; SUBCUTANEOUS at 12:59

## 2019-04-22 RX ADMIN — IOHEXOL 50 ML: 240 INJECTION, SOLUTION INTRATHECAL; INTRAVASCULAR; INTRAVENOUS; ORAL at 12:41

## 2019-04-22 RX ADMIN — SODIUM CHLORIDE 1000 ML: 9 INJECTION, SOLUTION INTRAVENOUS at 20:41

## 2019-04-22 RX ADMIN — INSULIN GLARGINE 10 UNITS: 100 INJECTION, SOLUTION SUBCUTANEOUS at 09:46

## 2019-04-22 RX ADMIN — DESMOPRESSIN ACETATE 40 MG: 0.2 TABLET ORAL at 22:45

## 2019-04-22 RX ADMIN — IOVERSOL 75 ML: 741 INJECTION INTRA-ARTERIAL; INTRAVENOUS at 14:07

## 2019-04-22 RX ADMIN — LEVETIRACETAM 750 MG: 500 TABLET, FILM COATED ORAL at 22:45

## 2019-04-22 RX ADMIN — INSULIN LISPRO 6 UNITS: 100 INJECTION, SOLUTION INTRAVENOUS; SUBCUTANEOUS at 23:33

## 2019-04-22 RX ADMIN — LEVETIRACETAM 750 MG: 500 TABLET, FILM COATED ORAL at 10:15

## 2019-04-22 RX ADMIN — CETIRIZINE HYDROCHLORIDE 10 MG: 10 TABLET ORAL at 22:45

## 2019-04-22 RX ADMIN — INSULIN LISPRO 12 UNITS: 100 INJECTION, SOLUTION INTRAVENOUS; SUBCUTANEOUS at 18:53

## 2019-04-22 RX ADMIN — INSULIN LISPRO 4 UNITS: 100 INJECTION, SOLUTION INTRAVENOUS; SUBCUTANEOUS at 09:47

## 2019-04-22 RX ADMIN — SODIUM CHLORIDE: 9 INJECTION, SOLUTION INTRAVENOUS at 11:18

## 2019-04-22 RX ADMIN — INSULIN GLARGINE 10 UNITS: 100 INJECTION, SOLUTION SUBCUTANEOUS at 21:14

## 2019-04-22 RX ADMIN — ACETAMINOPHEN 650 MG: 325 TABLET ORAL at 22:47

## 2019-04-22 ASSESSMENT — PAIN SCALES - GENERAL
PAINLEVEL_OUTOF10: 5
PAINLEVEL_OUTOF10: 4
PAINLEVEL_OUTOF10: 7
PAINLEVEL_OUTOF10: 4

## 2019-04-22 ASSESSMENT — PAIN DESCRIPTION - FREQUENCY: FREQUENCY: INTERMITTENT

## 2019-04-22 ASSESSMENT — PAIN DESCRIPTION - LOCATION
LOCATION: NECK

## 2019-04-22 ASSESSMENT — PAIN DESCRIPTION - PAIN TYPE: TYPE: ACUTE PAIN

## 2019-04-22 ASSESSMENT — PAIN DESCRIPTION - DESCRIPTORS: DESCRIPTORS: ACHING;DISCOMFORT

## 2019-04-22 NOTE — PROGRESS NOTES
[address] [place] [address]     Department of Internal Medicine - Staff Internal Medicine Service     DAILY PROGRESS NOTE - TEAM       Patient:  Alex Vieira  YOB: 1975  MRN: [id number]     Acct: [de-identified]     Admit date: 4/20/2019  Admitting Diagnosis: Vertebral artery dissection, seizure    Subjective:   Pt seen and Chart reviewed. Patient improved this a.m. Continues to have headaches and neck stiffness intermittently. Some tingling numbness in the upper extremity persisting but improved. VS stable, Afebrile. Patient continued to have elevated blood sugars and was insistent in managing her blood sugars through her self use of insulin pump. Patient was counseled and she agreed to staff management of blood sugars as she is a type I diabetic. Diabetic educator consulted. Patient asked to hold off the insulin through insulin pump. Currently started on 10 units of Lantus along with medium dose sliding scale. Patient nothing by mouth for now for imaging. Once patient has imaging done,  will increase the Lantus as needed. .   Intake/Output Summary (Last 24 hours) at 4/22/2019 1107  Last data filed at 4/21/2019 1830  Gross per 24 hour   Intake 755 ml   Output --   Net 755 ml         REVIEW OF SYSTEMS:  · Constitutional: Negative for Fever, chills  · Eyes: Negative for visual changes, diplopia  · ENT: Negative for mouth sores, sore throat. · Cardiovascular: Negative for lightheadedness ,chest pain, palpitations   · Respiratory:Negative for Shortness of breath,cough or wheezing. · Gastrointestinal: Negative for nausea/vomiting, change in bowel habits, abdominal pain  · Genitourinary:Negative for change in bladder habits, dysuria, hematuria.   · Musculoskeletal: Negative for joint pain   · Neurological: Positive for headache, change in muscle strength numbness/tingling  Objective:   /79   Pulse 66   Temp 97.9 °F (36.6 °C) (Oral)   Resp 16   Ht 6' 1\" (1.854 m)   Wt tablet by mouth once. Historical Provider, MD   insulin lispro (HUMALOG) 100 UNIT/ML injection vial Take as directed 9/4/14   Rena Crain MD   ibuprofen (ADVIL;MOTRIN) 800 MG tablet Take 800 mg by mouth every 6 hours as needed for Pain. Historical Provider, MD   diphenhydrAMINE (BENADRYL) 50 MG capsule Take 50 mg by mouth nightly as needed. Historical Provider, MD   insulin lispro (HUMALOG) 100 UNIT/ML injection Inject  into the skin.  Insulin pump     Historical Provider, MD     Scheduled Meds:   insulin lispro  0-12 Units Subcutaneous TID WC    insulin lispro  0-6 Units Subcutaneous Nightly    insulin glargine  10 Units Subcutaneous Daily    sodium chloride flush  10 mL Intravenous 2 times per day    [Held by provider] aspirin  325 mg Oral Daily    atorvastatin  40 mg Oral Nightly    levETIRAcetam  750 mg Oral BID    folic acid  1 mg Oral BID    [Held by provider] enoxaparin  40 mg Subcutaneous Daily    sodium chloride flush  10 mL Intravenous BID    cetirizine  10 mg Oral Nightly     Continuous Infusions:   sodium chloride 75 mL/hr at 04/21/19 2119    Insulin Pump - insulin lispro      dextrose       PRN Meds:     sodium chloride flush 10 mL PRN   acetaminophen 650 mg Q4H PRN   sodium chloride flush 10 mL PRN   magnesium hydroxide 30 mL Daily PRN   ondansetron 4 mg Q6H PRN   glucose 15 g PRN   dextrose 12.5 g PRN   glucagon (rDNA) 1 mg PRN   dextrose 100 mL/hr PRN   iohexol 50 mL ONCE PRN             LABS:  CBC:   Recent Labs     04/20/19 2127 04/21/19  0951 04/22/19 0459   WBC 8.3 12.8* 9.3   RBC 4.61 4.43 4.11   HGB 12.9 12.4 11.5*   HCT 40.8 40.7 37.4   MCV 88.5 91.9 91.0   RDW 12.8 12.9 13.0    243 222     BMP:   Recent Labs     04/20/19 2127 04/21/19  0951 04/22/19 0459    139 132*   K 4.1 4.8 4.9    106 101   CO2 23 23 21   BUN 6 10 15   CREATININE 0.48* 0.67 0.54     PT/INR:   Recent Labs     04/20/19 2127   PROTIME 10.2   INR 1.0     FASTING LIPID PANEL:  Lab Results   Component Value Date    CHOL 134 04/21/2019    HDL 61 04/21/2019    TRIG 50 04/21/2019       ASSESSMENT:     Active Problems:    Seizures ([facility]    Type 1 diabetes mellitus on insulin therapy ([facility]    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 *         PLAN:       1. Concern for Vertebral artery dissection. Per imaging/MRI focal marrow edema and enhancement in a portion of 52 vertebral body with indeterminate lesion. Per input from neurology patient will need CT scan with contrast to look for similar lesions. Patient will further need IR guided biopsy if similar lesions are found elsewhere on imaging. Continue aspirin 394 daily, folic acid 1 mg twice a day, Lipitor 40 mg nightly. Fasting lipid panel within normal limit. Patient to work with PT OT, speech. Echo pending. Continue hydration with IV normal saline at 75 ML per hour. Telemetry monitoring. Neuro checks per protocol. Keep systolic blood pressure less than 140 and that glucose less than 180.       2. Concern for seizure. No new seizure activity this admission. Aspiration precautions. Fall precautions. EEG monitoring as needed. Continue Keppra 750 mg twice daily. Will obtain Keppra levels as needed. Neurology on board.     3. History of diabetes mellitus on insulin therapy. Patient uses insulin pump at home, 36 units of basal insulin with pre-meal insulin as per calorie count. Point-of-care glucose checks before meals and at bedtime. Medium dose sliding scale for now. Blood glucose not at goal due to patient lack of proper usage. We will continue on Lantus 10 and medium dose sliding scale for now. Will increase the Lantus as needed. Diabetic education provided. DVT prophylaxis: EPC cuffs. Diet: Gen. diet  PT/OT evaluation  Disposition: MedSurg/stepdown. Ren Estrella M.D.   Internal Medicine Resident

## 2019-04-22 NOTE — PROGRESS NOTES
Smoking Cessation - topics covered   []  Health Risks  []  Benefits of Quitting   []  Smoking Cessation  []  Patient has no history of tobacco use  []  Patient is former smoker. []  No need for tobacco cessation education. []  Booklet given  [x]  Patient verbalizes understanding. [x]  Patient denies need for tobacco cessation education. []  Unable to meet with patient today. Will follow up as able.   Tanika Stevenson  3:27 PM

## 2019-04-22 NOTE — PROGRESS NOTES
At 2114, pt blood sugar level was 262. Pt bolused with insulin pump, received 1.8 units. When reviewing morning labs, glucose level of 387 was seen. Went in to check blood sugar level of pt, blood sugar was 307 at 0544AM. Pt states she had checked her blood sugar at 0512AM, blood sugar was 412, and bolused with insulin pump 6.6 units. Pt continued to decline sliding scale at this time. On call Internal Medicine resident informed of morning blood sugars.

## 2019-04-22 NOTE — PROGRESS NOTES
Speech Language Pathology  Speech Language Pathology  [facility]    Cognitive Treatment Note    Date: 4/22/2019  Patients Name: Karina Hager  MRN: [id number]  Diagnosis:   Patient Active Problem List   Diagnosis Code    Pleurisy R09.1    Bone lesion M89.9    Seizures ([facility] R56.9    Tobacco abuse disorder Z72.0    Type 1 diabetes mellitus on insulin therapy ([facility] E10.9    Insulin pump in place Z96.41    Localization-related epilepsy ([facility] G40.109    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 Memory and mental manipulation-size: 3 words; 5/5  4 words; 8/8   Delayed recall paired words associated: set 1(color coding)-5/5   Problem Solving/Reasoning: Making word deductions: 8/9 increased to 9/9 with min verbal cues. Plan:  [x] Continue ST services    [] Discharge from ST:      Discharge recommendations: [] Inpatient Rehab   [] [place]   [] Outpatient Therapy  [] Follow up at trauma clinic   [x] Other: No therapy    Treatment completed by: Completed by Vladimir Pena,  Clinician  Co-signed by: Panfilo Summers A.CCC/SLP

## 2019-04-22 NOTE — PROGRESS NOTES
Occupational Therapy   Occupational Therapy Initial Assessment  Date: 2019   Patient Name: Marilyn Jaramillo  MRN: [id number]     : 1975    Date of Service: 2019    Discharge Recommendations: No therapy recommended at discharge. OT Equipment Recommendations  Equipment Needed: No    Assessment   Prognosis: Good  Decision Making: Low Complexity  Patient Education: Safety awareness-good return  No Skilled OT: Independent with functional mobility; Independent with ADL's;At baseline function; Safe to return home  REQUIRES OT FOLLOW UP: No  Activity Tolerance  Activity Tolerance: Patient Tolerated treatment well;Patient limited by pain  Safety Devices  Safety Devices in place: Yes  Type of devices: All fall risk precautions in place; Left in chair;Call light within reach  Restraints  Initially in place: No         Patient Diagnosis(es): The primary encounter diagnosis was Nonintractable headache, unspecified chronicity pattern, unspecified headache type. A diagnosis of Numbness was also pertinent to this visit. has a past medical history of Bronchitis, Cancer ([address] Cerebral artery occlusion with cerebral infarction ([address] Diabetes mellitus ([address] Headache(784.0), Seizures ([address] and Type II or unspecified type diabetes mellitus without mention of complication, not stated as uncontrolled. has a past surgical history that includes Hysterectomy; Tubal ligation; Knee cartilage surgery (Left); Dental surgery; and Ovary removal (Bilateral).          Restrictions  Restrictions/Precautions  Restrictions/Precautions: General Precautions  Required Braces or Orthoses?: No  Position Activity Restriction  Other position/activity restrictions: up with assist    Subjective   General  Patient assessed for rehabilitation services?: Yes  Family / Caregiver Present: Yes(Father, son)  Diagnosis: T2 lesion, no NS intervention planned as of this date, neck pain, RUE numbness  Pain Assessment  Pain Assessment: 0-10  Pain Level: 4  Pain Type: Acute pain  Pain Location: Neck  Pain Descriptors: Aching;Discomfort  Pain Frequency: Intermittent  Non-Pharmaceutical Pain Intervention(s): Ambulation/Increased Activity;Repositioned; Therapeutic presence;Distraction; Emotional support  Response to Pain Intervention: Patient Satisfied  Oxygen Therapy  SpO2: 99 %  Pulse Oximeter Device Mode: Intermittent  Pulse Oximeter Device Location: Left;Finger  O2 Device: None (Room air)  Social/Functional History  Social/Functional History  Lives With: Significant other(and son/ 3 yo grandson)  Type of Home: House  Home Layout: Two level, Performs ADL's on one level, Able to Live on Main level with bedroom/bathroom  Home Access: Stairs to enter with rails  Entrance Stairs - Number of Steps: 3  Entrance Stairs - Rails: Both  Bathroom Shower/Tub: Walk-in shower  Bathroom Toilet: Standard  Bathroom Equipment: Grab bars in shower, Shower chair  Bathroom Accessibility: Accessible  Home Equipment: Standard walker  ADL Assistance: Independent  Homemaking Assistance: Independent  Homemaking Responsibilities: No  Ambulation Assistance: Independent  Transfer Assistance: Independent  Active : No  Patient's  Info: Boyfriend performs most  Mode of Transportation: Family, Friends  Occupation: Unemployed  Leisure & Hobbies: family time/video games  IADL Comments: Independent  Additional Comments: Sig other works nights, son able to assist often     Objective   Vision: Impaired  Vision Exceptions: Wears glasses for distance  Hearing: Within functional limits    Orientation  Overall Orientation Status: Within Functional Limits     Balance  Sitting Balance: Independent  Standing Balance: Independent  Standing Balance  Time: 8 min  Activity: Pt anastasiya bedside, sinkside, and func mob around room  Functional Mobility  Functional - Mobility Device: No device  Activity: To/from bathroom  Assist Level:  Independent  Functional Mobility Comments: No LOB or safety concerns

## 2019-04-22 NOTE — PROGRESS NOTES
[facility] Diabetes Education Nurse  Reason for Educator consult --- Evaluation and Assessment of use of insulin pump (continuous subcutaneous insulin infusion) from home and plan for continuation of patient self-management of insulin pump as inpatient. Patient is known Type 1DM since young adult years and has been on insulin pump therapy for about 12 years and is very well educated and competent in using pump. However patient does not have the infusion sets with her but does have the reservoirs and insulin. Patient is willing to go back on injections of basal and bolus. . Patient currently under care of Dr. Vernia Riedel from [facility]  for pump management and last visit was 4/15/19.1-2x/d. The patient does have a glucometer at home and states testing 1-2  times a day, but was informed from last visit she does need to test more often at lest 4x/d. Patient typically corrects for blood sugar Breakfast, Lunch and Dinner if she is eating. Patient has not been feeling well and not eating so has not been checking as frequently as she should be. Reinforced importance of checking BG every 3 -4h even if not eating. Home blood glucose meter does have blue tooth communication to pump, which is the One Touch Ultra. Patient does not manually enter BG values into pump. Patient states counting carbohydrates and entering those values into pump. Currently the patient has a Medtronic MiniMed 723 pump with Humalog at the following settings:   with basal rates: 1.5 unitsUnits/hour from 12a hours to 12a hours with total basal rate over 24 h 36.0 units. CARB TO INSULIN RATIO-   !2a-12a   1 unit for every 10 grams CHO    INSULIN SENSITIVITY FACTOR-   12a -12a  40 mg /dl    TARGET BLOOD GLUCOSE-   12a-12a 100 mg/dl- 150 mg/dl   Active insulin 3 hours      Patient states current pump site was placed on 4/19/19. Patient does not extra pump supplies at bedside. Patient has all supplies except infusion sets.  Patient does not think her  will be able to bring those up. Writer explained [place] policy that patient must have extra pump supplies available at bedside to continue use of pump as an inpatient. Patient verbalizes understanding and would be fully competent if she had supplies here. Patient's pump site was visualized and is currently without edema, redness, or leaking. However patient feels that there is poor absorption at site as BG are not responding to bolus per pump so agreeable to going on Lantus and Humalog for coverage of BG and CHO eaten. Recommendations:  Patient should be started on Lantus 36 units immediately as this is her total daily basal insulin per pump. Since she is Type 1 DM , she will need that amount even if NPO to prevent DKA. She will require insulin correction of 1 unit of Humalog for every 40 mg/dl  > 150 mg/dl not to be given any sooner than 3 hours. She will need to cover CHO eaten with 1 unit for every 10 grams CHO eaten. Patient will need BG checked every 4h even if not eating for correction. If BG remain > 300, Ketones need to be checked.

## 2019-04-22 NOTE — PLAN OF CARE
Problem: Falls - Risk of:  Goal: Will remain free from falls  Description  Will remain free from falls   4/22/2019 1824 by Sandra Purvis RN  Outcome: Met This Shift  4/22/2019 1822 by Sandra Purvis RN  Reactivated  4/22/2019 1719 by Chandler Cannon  Outcome: Completed  Goal: Absence of physical injury  Description  Absence of physical injury   4/22/2019 1824 by Sandra Purvis RN  Outcome: Met This Shift  4/22/2019 1822 by Sandra Purvis RN  Reactivated  4/22/2019 1719 by Chandler Cannon  Outcome: Completed     Problem: Safety:  Goal: Free from accidental physical injury  Description  Free from accidental physical injury  Outcome: Met This Shift  Goal: Free from intentional harm  Description  Free from intentional harm  Outcome: Met This Shift

## 2019-04-22 NOTE — PROGRESS NOTES
Physical Therapy  DATE: 2019    NAME: Alex Vieira  MRN: [id number]   : 1975    Patient not seen this date for Physical Therapy due to:  [] Blood transfusion in progress  [] Hemodialysis  []  Patient Declined  [] Spine Precautions   [] Strict Bedrest  [] Surgery/ Procedure  [] Testing      [] Other        [x] PT being discontinued at this time. Patient independent. No further needs. - 19 Pt independently ambulated to the nurses station and reported she has no concerns about function or returning home. [] PT being discontinued at this time as the patient has been transferred to palliative care. No further needs.     Chiquita Amaro, Student Physical Therapist

## 2019-04-22 NOTE — PROGRESS NOTES
NEUROLOGY INPATIENT CONSULT NOTE    4/22/2019         Meenakshi Castanon is a  37 y.o. female admitted on 4/20/2019 with  Numbness [R20.0]      History is obtained mostly from the patient and the medical record and from the caregivers. Chart is reviewed and patient is examined. Briefly, this is a  37 y.o. female admitted on 4/20/2019 with neck pain, headache, numbness and weakness in right upper extremity. Patient has past medical history of seizures, type 1 diabetes on insulin pump, cervical cancer status post bilateral salpingo-oophorectomy and total hysterectomy presented with neck pain for the last 3 days. The neck pain is worsening and constant. Patient is also complaining of intermittent dizziness. Patient is also complaining of headache and out of 10 in intensity occipital region more towards left. For the last 1 day. No aggravating or relieving factors. .  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 Complaining of photophobia, phonophobia. Patient has history of migraines she is on ibuprofen. She also states that having recurrent seizures.  States that her first seizure was in February last year, sometimes she has frequent seizures every day, sometimes associated with aura of burning smell, goosebumps, palpitations, respiratory distress and stares blankly for seconds or minutes.  She also states that sometimes she has  generalized tonic-clonic seizures associated with postictal confusion.  But denies fecal incontinence, urinary incontinence. Patient was started on Keppra 750 twice a day the last admission. The patient stating that she is seizure free since then. Patient is compliant with her medications. Home medications patient is on aspirin 325 daily, Keppra 750 twice a day, Percocet, sertraline, Xanax, naproxen.          EEG 9/29/2018  These findings are indicative of mild focal cerebral dysfunction   in the right frontotemporal region but are not specific as to   etiology. These findings do place patient at risk for focal onset   seizures. There were no clinical or electrographic seizures   Recorded. Subjective   04/22/19  Patient seen and examined today  No acute issues overnight  Neck pain better still present 3 out of 10 in intensity tenderness to palpate  No complaining of headache, nausea or vomiting      No current facility-administered medications on file prior to encounter. Current Outpatient Medications on File Prior to Encounter   Medication Sig Dispense Refill    KEPPRA 250 MG tablet Take 3 tablets by mouth 2 times daily 180 tablet 3    naproxen (NAPROSYN) 375 MG tablet Take 1 tablet by mouth 2 times daily 30 tablet 0    aspirin (ECPIRIN) 325 MG EC tablet Take 1 tablet by mouth daily 30 tablet 3    atorvastatin (LIPITOR) 40 MG tablet Take 1 tablet by mouth nightly 30 tablet 3    famotidine (PEPCID) 20 MG tablet Take 1 tablet by mouth 2 times daily 60 tablet 3    ALPRAZolam (XANAX) 0.25 MG tablet Take 0.25 mg by mouth nightly as needed for Sleep. Paula Goodwin sertraline (ZOLOFT) 100 MG tablet Take 200 mg by mouth nightly       ondansetron (ZOFRAN) 4 MG tablet Take 1 tablet by mouth every 4 hours as needed for Nausea or Vomiting 15 tablet 0    albuterol sulfate  (90 BASE) MCG/ACT inhaler Inhale 2 puffs into the lungs every 6 hours as needed for Wheezing      oxyCODONE-acetaminophen (PERCOCET) 5-325 MG per tablet Take 1 tablet by mouth once.  insulin lispro (HUMALOG) 100 UNIT/ML injection vial Take as directed 1 vial 3    ibuprofen (ADVIL;MOTRIN) 800 MG tablet Take 800 mg by mouth every 6 hours as needed for Pain.  diphenhydrAMINE (BENADRYL) 50 MG capsule Take 50 mg by mouth nightly as needed.  insulin lispro (HUMALOG) 100 UNIT/ML injection Inject  into the skin.  Insulin pump        Allergies: [facility] NOVAK Suhas Medina is allergic to bee venom; other; and tape [adhesive tape]. Past Medical History:   Diagnosis Date    Bronchitis     with RAD (seen in [facility] ER 3 days ago)    Cancer [facility])     cervical cancer at age 22    Cerebral artery occlusion with cerebral infarction [facility])     January 2019    Diabetes mellitus ([facility]     Headache(784.0)     Migraines    Seizures ([facility]     Type II or unspecified type diabetes mellitus without mention of complication, not stated as uncontrolled        Past Surgical History:   Procedure Laterality Date    DENTAL SURGERY      x4    HYSTERECTOMY      KNEE CARTILAGE SURGERY Left     OVARY REMOVAL Bilateral     TUBAL LIGATION       Social History: Lobo Pappas  reports that she has been smoking cigarettes. She has a 6.25 pack-year smoking history. She has never used smokeless tobacco. She reports that she does not drink alcohol or use drugs.     Family History   Problem Relation Age of Onset    Cancer Father     Stroke Maternal Grandmother     Cancer Paternal Grandfather        Current Medications:     insulin lispro  0-12 Units Subcutaneous TID WC    insulin lispro  0-6 Units Subcutaneous Nightly    insulin glargine  10 Units Subcutaneous Daily    sodium chloride flush  10 mL Intravenous 2 times per day    [Held by provider] aspirin  325 mg Oral Daily    atorvastatin  40 mg Oral Nightly    levETIRAcetam  750 mg Oral BID    folic acid  1 mg Oral BID    [Held by provider] enoxaparin  40 mg Subcutaneous Daily    sodium chloride flush  10 mL Intravenous BID    cetirizine  10 mg Oral Nightly     PRN Meds include:     ROS:   Constitutional Negative for fever and chills   HEENT Negative for ear discharge, ear pain, nosebleed   Eyes Negative for photophobia, pain and discharge   Respiratory Negative for hemoptysis and sputum   Cardiovascular Negative for orthopnea, claudication and PND   Gastrointestinal Negative for abdominal pain, diarrhea, blood in stool Musculoskeletal Negative for joint pain, negative for myalgia   Skin Negative for rash or itching   Endo/heme/allergies Negative for polydipsia, environmental allergy   Psychiatric Negative for suicidal ideation. Patient is not anxious           Objective:   /79   Pulse 66   Temp 97.9 °F (36.6 °C) (Oral)   Resp 16   Ht 6' 1\" (1.854 m)   Wt 214 lb (97.1 kg)   SpO2 99%   BMI 28.23 kg/m²     Blood pressure range: Systolic (01DAW), ROQ:848 , Min:93 , HFN:040   ; Diastolic (47DIZ), DPQ:66, Min:53, Max:83                     EXAMINATION:  GENERAL    SKIN  Appears comfortable and in no distress    No gross lesions, rashes, bruising or bleeding on exposed skin area   HEENT NC/ AT  Eyes:no icterus, redness, pupils equal and reactive, extraocular eye movements intact, conjunctiva clear  Ear: normal external ear, no discharge, hearing intact  Nose:  no drainage noted  Mouth: mucous membranes moist   NECK  LUNGS  Supple, tenderness noted back of the neck. and no bruits heard  Clear to auscultation,b/l   Cardiovascular  Abdomen  S1, S2 heard; radial pulse intact,RRR  Soft,non-tender,no organomegaly,BS+   MENTAL STATUS:  Alert, oriented, intact memory, no confusion, normal speech, normal language, no hallucination or delusion   CRANIAL NERVES: II     -       Pupils reactive b/l visual acuity: 20/30 OU; Fundus exam: intact venous pulsations;  Visual fields intact to confrontation  III,IV,VI -  EOMs full, no afferent defect, no                      MITA, no ptosis  V     -     Normal facial sensation  VII    -     Normal facial symmetry  VIII   -     Intact hearing  IX,X -     Symmetrical palate  XI    -     Symmetrical shoulder shrug  XII   -     Midline tongue, no atrophy    MOTOR FUNCTION:  Bulk R side:Normal            L side:Normal  Tone  R side:Normal            L side:Normal   Power 5/5 B/L Upper and LE  5/5 lower extremties;      no involuntary movements, no tremor     SENSORY FUNCTION:                      Extremities: Touch     R side:Normal                 L side:Normal     Pain        R side:Normal                  L side:Normal  Vibration  R side:Normal                  L side:Normal    Proprioception    R side:Normal                             L side:Normal    Peripheral pulses palpable, no pedal edema or calf pain with palpation   CEREBELLAR FUNCTION:  Heel to Shin:     Right side:  normal                             Left side:  normal    Finger to Nose:   Right:  normal                              Left:  normal            REFLEX FUNCTION:  Symmetric, no perverted reflex,      Right Bicep:  2+  Left Bicep:  2+  Right Knee:  2+  Left Knee:  2+    Babinski sign   Right side:Downgoing                          Left side   :Downgoing   STATION and GAIT  Not tested           Data:    Lab Results:     Lab Results   Component Value Date    CHOL 134 04/21/2019    LDLCHOLESTEROL 63 04/21/2019    HDL 61 04/21/2019    TRIG 50 04/21/2019    ALT 9 01/15/2019    AST 19 01/15/2019    TSH 1.63 01/17/2017    INR 1.0 04/20/2019    LABA1C 10.6 (H) 01/16/2019    LABMICR 5 11/01/2016    WBWOCGRZ21 518 04/21/2019     Hematology:  Recent Labs     04/20/19 2127 04/21/19  0951 04/22/19  0459   WBC 8.3 12.8* 9.3   HGB 12.9 12.4 11.5*   HCT 40.8 40.7 37.4    243 222   INR 1.0  --   --      Chemistry:  Recent Labs     04/20/19 2127 04/21/19  0951 04/22/19  0459    139 132*   K 4.1 4.8 4.9    106 101   CO2 23 23 21   GLUCOSE 146* 311* 387*   BUN 6 10 15   CREATININE 0.48* 0.67 0.54   MG 2.2  --   --    CALCIUM 9.0 8.6 8.2*     Recent Labs     04/21/19  0951   CHOL 134   HDL 61   LDLCHOLESTEROL 63   CHOLHDLRATIO 2.2   TRIG 50   VLDL NOT REPORTED       No results found for: PHENYTOIN, PHENYTOIN, VALPROATE, CBMZ          Impression and Plan:      Ms. Ede Linda is a 37 y.o. 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  - CTH WO    - CTA H/N - no major stenosis or dissection     - MRI Brain WO contrast - unremarkable                            - MRI Cervical spine - Focal marrow edema and enhancement in a portion of the T2 vertebral body                           - IR consulted for possible drainage     -  daily    - Folic acid 1mg BID    - Lipitor 40mg nightly     - PT, OT, Speech eval                           -  B12 levels WNL    - Telemetry     - Neuro checks per protocol   - We recommend SBP less than 140   - Blood glucose goal less than 180   - Please avoid dextrose containing solution               - Caution in using NSAID's. Discussed with patient and Nurse. Will follow with you. Thank you for consultation.          Madonna Paget MD Pager: [phone]  Electronically signed 4/22/2019 at 9:56 AM

## 2019-04-23 VITALS
WEIGHT: 214 LBS | DIASTOLIC BLOOD PRESSURE: 65 MMHG | HEART RATE: 74 BPM | TEMPERATURE: 98.6 F | BODY MASS INDEX: 28.99 KG/M2 | HEIGHT: 72 IN | OXYGEN SATURATION: 95 % | RESPIRATION RATE: 17 BRPM | SYSTOLIC BLOOD PRESSURE: 110 MMHG

## 2019-04-23 LAB
ABSOLUTE EOS #: 0.1 K/UL (ref 0–0.44)
ABSOLUTE IMMATURE GRANULOCYTE: 0.03 K/UL (ref 0–0.3)
ABSOLUTE LYMPH #: 2.86 K/UL (ref 1.1–3.7)
ABSOLUTE MONO #: 0.73 K/UL (ref 0.1–1.2)
ANION GAP SERPL CALCULATED.3IONS-SCNC: 9 MMOL/L (ref 9–17)
BASOPHILS # BLD: 0 % (ref 0–2)
BASOPHILS ABSOLUTE: 0.04 K/UL (ref 0–0.2)
BUN BLDV-MCNC: 15 MG/DL (ref 6–20)
BUN/CREAT BLD: ABNORMAL (ref 9–20)
CALCIUM SERPL-MCNC: 8.4 MG/DL (ref 8.6–10.4)
CHLORIDE BLD-SCNC: 104 MMOL/L (ref 98–107)
CO2: 23 MMOL/L (ref 20–31)
CREAT SERPL-MCNC: 0.72 MG/DL (ref 0.5–0.9)
DIFFERENTIAL TYPE: ABNORMAL
EOSINOPHILS RELATIVE PERCENT: 1 % (ref 1–4)
GFR AFRICAN AMERICAN: >60 ML/MIN
GFR NON-AFRICAN AMERICAN: >60 ML/MIN
GFR SERPL CREATININE-BSD FRML MDRD: ABNORMAL ML/MIN/{1.73_M2}
GFR SERPL CREATININE-BSD FRML MDRD: ABNORMAL ML/MIN/{1.73_M2}
GLUCOSE BLD-MCNC: 273 MG/DL (ref 65–105)
GLUCOSE BLD-MCNC: 281 MG/DL (ref 65–105)
GLUCOSE BLD-MCNC: 314 MG/DL (ref 70–99)
GLUCOSE BLD-MCNC: 326 MG/DL (ref 65–105)
GLUCOSE BLD-MCNC: 378 MG/DL (ref 65–105)
GLUCOSE BLD-MCNC: 398 MG/DL (ref 65–105)
HCT VFR BLD CALC: 36.9 % (ref 36.3–47.1)
HEMOGLOBIN: 11.8 G/DL (ref 11.9–15.1)
IMMATURE GRANULOCYTES: 0 %
LYMPHOCYTES # BLD: 32 % (ref 24–43)
MCH RBC QN AUTO: 28.6 PG (ref 25.2–33.5)
MCHC RBC AUTO-ENTMCNC: 32 G/DL (ref 28.4–34.8)
MCV RBC AUTO: 89.6 FL (ref 82.6–102.9)
MONOCYTES # BLD: 8 % (ref 3–12)
NRBC AUTOMATED: 0 PER 100 WBC
PDW BLD-RTO: 13 % (ref 11.8–14.4)
PLATELET # BLD: 232 K/UL (ref 138–453)
PLATELET ESTIMATE: ABNORMAL
PMV BLD AUTO: 10.8 FL (ref 8.1–13.5)
POTASSIUM SERPL-SCNC: 4.6 MMOL/L (ref 3.7–5.3)
RBC # BLD: 4.12 M/UL (ref 3.95–5.11)
RBC # BLD: ABNORMAL 10*6/UL
SEG NEUTROPHILS: 59 % (ref 36–65)
SEGMENTED NEUTROPHILS ABSOLUTE COUNT: 5.13 K/UL (ref 1.5–8.1)
SODIUM BLD-SCNC: 136 MMOL/L (ref 135–144)
WBC # BLD: 8.9 K/UL (ref 3.5–11.3)
WBC # BLD: ABNORMAL 10*3/UL

## 2019-04-23 PROCEDURE — 36415 COLL VENOUS BLD VENIPUNCTURE: CPT

## 2019-04-23 PROCEDURE — 99223 1ST HOSP IP/OBS HIGH 75: CPT | Performed by: INTERNAL MEDICINE

## 2019-04-23 PROCEDURE — 82947 ASSAY GLUCOSE BLOOD QUANT: CPT

## 2019-04-23 PROCEDURE — 80048 BASIC METABOLIC PNL TOTAL CA: CPT

## 2019-04-23 PROCEDURE — 85025 COMPLETE CBC W/AUTO DIFF WBC: CPT

## 2019-04-23 PROCEDURE — 97127 HC SP THER IVNTJ W/FOCUS COG FUNCJ: CPT

## 2019-04-23 PROCEDURE — 6370000000 HC RX 637 (ALT 250 FOR IP): Performed by: STUDENT IN AN ORGANIZED HEALTH CARE EDUCATION/TRAINING PROGRAM

## 2019-04-23 PROCEDURE — G0378 HOSPITAL OBSERVATION PER HR: HCPCS

## 2019-04-23 RX ORDER — INSULIN GLARGINE 100 [IU]/ML
30 INJECTION, SOLUTION SUBCUTANEOUS DAILY
Qty: 1 VIAL | Refills: 3 | Status: SHIPPED | OUTPATIENT
Start: 2019-04-24

## 2019-04-23 RX ORDER — INSULIN GLARGINE 100 [IU]/ML
22 INJECTION, SOLUTION SUBCUTANEOUS DAILY
Qty: 1 VIAL | Refills: 3 | Status: SHIPPED | OUTPATIENT
Start: 2019-04-24 | End: 2019-04-23

## 2019-04-23 RX ORDER — INSULIN GLARGINE 100 [IU]/ML
30 INJECTION, SOLUTION SUBCUTANEOUS DAILY
Qty: 1 VIAL | Refills: 3 | Status: SHIPPED | OUTPATIENT
Start: 2019-04-24 | End: 2019-04-23

## 2019-04-23 RX ORDER — INSULIN GLARGINE 100 [IU]/ML
22 INJECTION, SOLUTION SUBCUTANEOUS DAILY
Status: DISCONTINUED | OUTPATIENT
Start: 2019-04-23 | End: 2019-04-23 | Stop reason: HOSPADM

## 2019-04-23 RX ADMIN — INSULIN GLARGINE 22 UNITS: 100 INJECTION, SOLUTION SUBCUTANEOUS at 09:13

## 2019-04-23 RX ADMIN — Medication: at 08:25

## 2019-04-23 RX ADMIN — FOLIC ACID 1 MG: 1 TABLET ORAL at 08:22

## 2019-04-23 RX ADMIN — LEVETIRACETAM 750 MG: 500 TABLET, FILM COATED ORAL at 08:22

## 2019-04-23 RX ADMIN — INSULIN LISPRO 12 UNITS: 100 INJECTION, SOLUTION INTRAVENOUS; SUBCUTANEOUS at 13:11

## 2019-04-23 RX ADMIN — INSULIN LISPRO 15 UNITS: 100 INJECTION, SOLUTION INTRAVENOUS; SUBCUTANEOUS at 09:12

## 2019-04-23 ASSESSMENT — PAIN SCALES - GENERAL: PAINLEVEL_OUTOF10: 0

## 2019-04-23 NOTE — DISCHARGE INSTR - COC
INTERVENTION:[id number]}  Weight Bearing Status/Restrictions: [facility] Weight Bearin}  Other Medical Equipment (for information only, NOT a DME order):  {EQUIPMENT:898456709}  Other Treatments: ***    Patient's personal belongings (please select all that are sent with patient):  {CHP DME Belongings:379337795}    RN SIGNATURE:  {Esignature:[id number]}    CASE MANAGEMENT/SOCIAL WORK SECTION    Inpatient Status Date: ***    Readmission Risk Assessment Score:  Readmission Risk              Risk of Unplanned Readmission:        15           Discharging to Facility/ Agency   · Name:   · Address:  · Phone:  · Fax:    Dialysis Facility (if applicable)   · Name:  · Address:  · Dialysis Schedule:  · Phone:  · Fax:    / signature: {Esignature:[id number]}    PHYSICIAN SECTION    Prognosis: {Prognosis:7372358940}    Condition at Discharge: [facility] Patient Condition:026065644}    Rehab Potential (if transferring to Rehab): {Prognosis:7423396100}    Recommended Labs or Other Treatments After Discharge: ***    Physician Certification: I certify the above information and transfer of Ede Linda  is necessary for the continuing treatment of the diagnosis listed and that she requires {Admit to Appropriate Level of Care:47861} for {GREATER/LESS:157476074} 30 days.      Update Admission H&P: {CHP DME Changes in ITSXZ:024109172}    PHYSICIAN SIGNATURE:  {Esignature:[id number]}

## 2019-04-23 NOTE — PROGRESS NOTES
filed at 4/22/2019 1800  Gross per 24 hour   Intake 1312 ml   Output --   Net 1312 ml       · General appearance: awake, alert, cooperative  · HEENT: Head: Normocephalic, no lesions, without obvious abnormality. · Lungs: clear to auscultation bilaterally  · Heart: regular rate and rhythm, S1, S2 normal, no murmur  · Abdomen: soft, non-tender; bowel sounds normal; no masses,  no organomegaly  · Extremities: extremities normal, atraumatic, no cyanosis or edema  · Neurological:  Awake, alert, oriented to name, place and time. Cranial nerves II-XII are grossly intact. Reflexes normal and symmetric. Sensation grossly normal  · Eye no icterus no redness    Medications:   MEDICATIONS:  Prior to Admission medications    Medication Sig Start Date End Date Taking? Authorizing Provider   KEPPRA 250 MG tablet Take 3 tablets by mouth 2 times daily 2/8/19 3/10/19  Ricco Eric MD   naproxen (NAPROSYN) 375 MG tablet Take 1 tablet by mouth 2 times daily 1/21/19   Saranya Pollack MD   aspirin (ECPIRIN) 325 MG EC tablet Take 1 tablet by mouth daily 1/17/19   Kristina Monge MD   atorvastatin (LIPITOR) 40 MG tablet Take 1 tablet by mouth nightly 1/17/19   Kristina Monge MD   famotidine (PEPCID) 20 MG tablet Take 1 tablet by mouth 2 times daily 1/17/19   Kristina Monge MD   ALPRAZolam Jeraldene Halsted) 0.25 MG tablet Take 0.25 mg by mouth nightly as needed for Sleep. Romansh Justice Historical Provider, MD   sertraline (ZOLOFT) 100 MG tablet Take 200 mg by mouth nightly     Historical Provider, MD   ondansetron (ZOFRAN) 4 MG tablet Take 1 tablet by mouth every 4 hours as needed for Nausea or Vomiting 2/15/18   Georges Styles MD   albuterol sulfate  (90 BASE) MCG/ACT inhaler Inhale 2 puffs into the lungs every 6 hours as needed for Wheezing    Historical Provider, MD   oxyCODONE-acetaminophen (PERCOCET) 5-325 MG per tablet Take 1 tablet by mouth once.     Historical Provider, MD   insulin lispro (HUMALOG) 100 UNIT/ML injection vial Take as directed 9/4/14   Ivis Fox MD   ibuprofen (ADVIL;MOTRIN) 800 MG tablet Take 800 mg by mouth every 6 hours as needed for Pain. Historical Provider, MD   diphenhydrAMINE (BENADRYL) 50 MG capsule Take 50 mg by mouth nightly as needed. Historical Provider, MD   insulin lispro (HUMALOG) 100 UNIT/ML injection Inject  into the skin.  Insulin pump     Historical Provider, MD     Scheduled Meds:   insulin glargine  22 Units Subcutaneous Daily    HYDROCERIN   Topical BID    insulin lispro  0-18 Units Subcutaneous TID WC    insulin lispro  0-9 Units Subcutaneous Nightly    sodium chloride flush  10 mL Intravenous 2 times per day    [Held by provider] aspirin  325 mg Oral Daily    atorvastatin  40 mg Oral Nightly    levETIRAcetam  750 mg Oral BID    folic acid  1 mg Oral BID    [Held by provider] enoxaparin  40 mg Subcutaneous Daily    sodium chloride flush  10 mL Intravenous BID    cetirizine  10 mg Oral Nightly     Continuous Infusions:   sodium chloride 75 mL/hr at 04/22/19 1118    Insulin Pump - insulin lispro      dextrose       PRN Meds:     sodium chloride flush 10 mL PRN   acetaminophen 650 mg Q4H PRN   sodium chloride flush 10 mL PRN   magnesium hydroxide 30 mL Daily PRN   ondansetron 4 mg Q6H PRN   glucose 15 g PRN   dextrose 12.5 g PRN   glucagon (rDNA) 1 mg PRN   dextrose 100 mL/hr PRN             LABS:  CBC:   Recent Labs     04/21/19  0951 04/22/19  0459 04/23/19  0434   WBC 12.8* 9.3 8.9   RBC 4.43 4.11 4.12   HGB 12.4 11.5* 11.8*   HCT 40.7 37.4 36.9   MCV 91.9 91.0 89.6   RDW 12.9 13.0 13.0    222 232     BMP:   Recent Labs     04/21/19  0951 04/22/19  0459 04/23/19  0434    132* 136   K 4.8 4.9 4.6    101 104   CO2 23 21 23   BUN 10 15 15   CREATININE 0.67 0.54 0.72     PT/INR:   Recent Labs     04/20/19 2127   PROTIME 10.2   INR 1.0     FASTING LIPID PANEL:  Lab Results   Component Value Date    CHOL 134 04/21/2019    HDL 61 04/21/2019    TRIG 50 04/21/2019 ASSESSMENT:     Active Problems:    Seizures ([facility]    Type 1 diabetes mellitus on insulin therapy ([facility]    Vertebral artery dissection (HCC)    History of seizure    Nonintractable headache    Numbness    Neck pain on left side    Bone marrow edema  Resolved Problems:    * No resolved hospital problems. *         PLAN:     1. Concern for Vertebral artery dissection. hemangioma detected per imaging the T2 spine and right second rib. Will confirm with neurology regarding any IR guided biopsy/procedure. . Continue aspirin 541 daily, folic acid 1 mg twice a day, Lipitor 40 mg nightly. Fasting lipid panel within normal limit. Patient to work with PT OT, speech. Will discontinue hydration as patient tolerating oral well. Telemetry monitoring. Neuro checks per protocol. Keep systolic blood pressure less than 140 and that glucose less than 180.       2. Concern for seizure. No new seizure activity this admission. Aspiration precautions. Fall precautions. Continue Keppra 750 mg twice daily. neurology signed off.     3. History of diabetes mellitus on insulin therapy. Patient agreeable to staff management of blood sugars. Lantus increased to 22 units with HD ISS. POC glucose checks before meals and at bedtime . Diabetic education provided. Patient advised to follow-up with endocrinologist for management of blood sugars via home insulin pump. Patient has a right upper nodule measuring 6 mm in the lung. Given the patient's smoking history, recommended to follow-up with low-dose CT at 12 months or before. Follow-up with pulmonology as outpatient. Patient will require outpatient sleep study also. DVT prophylaxis: EPC cuffs. Diet: Gen. diet  PT/OT evaluation  Disposition: MedSurg/stepdown. Rima Collins M.D.   Internal Medicine Resident , PGY-1  [address] [place] [address]  04/23/19   Attending Physician Statement  I have discussed the care of Rell Be, including pertinent history and exam findings,  with the resident. I have seen and examined the patient and the key elements of all parts of the encounter have been performed by me. I agree with the assessment, plan and orders as documented by the resident with additions . CC Neck PAIN  Likely migrains. No neuro deficit. Can go home    Treatment plan Discussed with nursing staff in detail , all questions answered . Electronically signed by Yara Cole MD on   4/23/19 at 3:42 PM    Please note that this chart was generated using voice recognition Dragon dictation software. Although every effort was made to ensure the accuracy of this automated transcription, some errors in transcription may have occurred.

## 2019-04-23 NOTE — PROGRESS NOTES
Speech Language Pathology  Speech Language Pathology  [address] [facility]    Cognitive Treatment Note    Date: 2019  Patients Name: Stephani Haro  MRN: [id number]  Diagnosis:   Patient Active Problem List   Diagnosis Code    Pleurisy R09.1    Bone lesion M89.9    Seizures ([facility] R56.9    Tobacco abuse disorder Z72.0    Type 1 diabetes mellitus on insulin therapy ([facility] E10.9    Insulin pump in place Z96.41    Localization-related epilepsy ([facility] G40.109    Vertebral artery dissection ([facility] I77.74    Acute ischemic vertebrobasilar artery cerebellar stroke ([facility] 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 Problem Solving/Reasoning: Making word deductions: 5/5    Plan:  [x] Continue ST services    [] Discharge from :      Discharge recommendations: [] Inpatient Rehab   [] [place]   [] Outpatient Therapy  [] Follow up at trauma clinic   [x] Other: No therapy     Treatment completed by: Completed by Oswald Blair,  Clinician  Co-signed by: Martha Garcia A.CCC/SLP

## 2019-04-23 NOTE — PROGRESS NOTES
Pt blood sugar sent to day resident, [phone] at 1925. RN sent perfect serve to Dr. Claude Closs   7:49 pm: looking for clarification on bolus, pt has a diet and has been eating, drinking, voiding appropriately. also, do you want me to give the meal insulin and HS? because one is ordered for 2000, and the other at 2100. please clarify plan. Thanks  Dr. Claude Closs called unit 8:09pm.    Spoke with RN, stated to give lantus, check blood sugar in 2 hours and then follow up with resident, she would advise RN to utilize sliding scale at that time. RN sent perfect serve to Dr. Claude Closs   8:51 pm: spoke with pharmacy, lantus onset is 3-4 hours. still waiting on lantus from pharmacy, pt still has not eaten dinner. do you want me to still wait to give sliding scale? Pt blood sugar taken at 2114, was 295, pt received 10 units of lantus    RN sent perfect serve to Dr. Claude Closs   10:53 pm: pt states she could tell BS was high, wasn't feeling well. . Would you like sliding scale at this time? also, pt requesting home Zoloft reordered. thanks   11:24 pm: please advise  Call received from Dr. Claude Closs at 11:29pm   Spoke with RN, was told to utilize sliding scale at this time. RN asked when resident would like blood sugar rechecked, RN asked if 2AM is an appropriate time, was questioned as to how often RN had been checking blood sugar. Stated it was okay to recheck at 2AM.    Pt blood sugar taken at 2333, was 323, pt received 6 units of humalog    RN sent perfect serve to Dr. Claude Closs    2:31 am: pt blood sugar 273   Dr. Claude Closs at 2:32 am: When was last insulin given   RN replied at 2:33 am:6 units of Humalog at 2333, 10 units of lantus at 2114   Dr. Avtar Barron at 2:34 am: \"Ok, no action needed.  Thanks\"  RN sent perfect serve to Dr. Claude Closs    4:37 am: just fyi when lab was here to draw pt, I spot checked her sugar, is currently 281

## 2019-04-30 RX ORDER — LEVETIRACETAM 250 MG/1
TABLET ORAL
Qty: 180 TABLET | Refills: 0 | Status: SHIPPED | OUTPATIENT
Start: 2019-04-30 | End: 2019-09-04

## 2019-04-30 ASSESSMENT — ENCOUNTER SYMPTOMS
GASTROINTESTINAL NEGATIVE: 1
RESPIRATORY NEGATIVE: 1

## 2019-04-30 NOTE — DISCHARGE SUMMARY
[address] [place] [address]     Department of Internal Medicine - Staff Internal Medicine Service    INPATIENT DISCHARGE SUMMARY      PATIENT IDENTIFICATION:  NAME:  Meenakshi Castanon   :   1975  MRN:    [id number]     Acct:    [de-identified]   Admit Date:  2019  Discharge date:  2019  Attending Provider: Dr. Larose Bud:   Meenakshi Castanon is a 37 y.o. female who presented with   Chief Complaint   Patient presents with    Numbness     R arm and bilateral finger tips, started 6 hours ago    Neck Pain     x3 days, getting worse, denies injury         DIAGNOSES:  Primary:   Numbness [R20.0]    Secondary: Active Hospital Problems    Diagnosis Date Noted    Neck pain on left side [M54.2]     Bone marrow edema [D75.89]     Numbness [R20.0] 2019    Nonintractable headache [R51]     Vertebral artery dissection ([facility] [I77.74] 2019    History of seizure [Z87.898]     Type 1 diabetes mellitus on insulin therapy ([facility] [E10.9] 2018    Seizures ([facility] [R56.9] 2018       TREATMENT:  Brief Inpatient Course: Patient admitted with diagnosis of concern for vertebral artery dissection given her past medical hx of the same. Per imaging no new dissection. Also had hx of seizures and complicated migraines. Treated with ASA, Lipitor and  fluids. Neurology was following along. Patient also given anti seizure medication. Also had uncontrolled blood sugars due to poor knowledge and usage insulin pump. Initially declined staff management of blood glucose levels. Later on agreed as the sugars were running uncontrolled. Blood sugars were controlled later on. Patient was provided diabetic education and was counseled regarding regular follow up with the endocrinologist.Patient also had a right upper nodule measuring 6 mm in the lung.   Given the patient's smoking history, recommended to follow-up with low-dose CT at 12 months or before. Follow-up with pulmonology as outpatient. Patient will require outpatient sleep study also. Patient was deemed in a stable condition for discharge. Consults:   neurology    Procedures:  none    Any Hospital Acquired Infections: none     PATIENT'S DISCHARGE CONDITION:    Patient was deemed to be stable for discharge. PATIENT/FAMILY INSTRUCTIONS:   Discharge Medication List as of 4/23/2019  2:42 PM      CONTINUE these medications which have CHANGED    Details   insulin lispro (HUMALOG KWIKPEN) 100 UNIT/ML pen <150 mg /dl 0 Units  151-200 2 Units  201-250 4 Units  251-300 6 Units  301-350 8 Units  351-400 10 Units  >400 12 Units, Disp-3 pen, R-1Print      insulin glargine (LANTUS) 100 UNIT/ML injection vial Inject 30 Units into the skin daily, Disp-1 vial, R-3Print         CONTINUE these medications which have NOT CHANGED    Details   KEPPRA 250 MG tablet Take 3 tablets by mouth 2 times daily, Disp-180 tablet, R-3, DAWPatient needs 250 mg tab as she cannot swallow 500 mg tab. Take 3 tabs of 250 mg two times a day. Normal      aspirin (ECPIRIN) 325 MG EC tablet Take 1 tablet by mouth daily, Disp-30 tablet, R-3Normal      atorvastatin (LIPITOR) 40 MG tablet Take 1 tablet by mouth nightly, Disp-30 tablet, R-3Normal      famotidine (PEPCID) 20 MG tablet Take 1 tablet by mouth 2 times daily, Disp-60 tablet, R-3Normal      ALPRAZolam (XANAX) 0.25 MG tablet Take 0.25 mg by mouth nightly as needed for Sleep. [facility] Historical Med      sertraline (ZOLOFT) 100 MG tablet Take 200 mg by mouth nightly Historical Med      ondansetron (ZOFRAN) 4 MG tablet Take 1 tablet by mouth every 4 hours as needed for Nausea or Vomiting, Disp-15 tablet, R-0Print      albuterol sulfate  (90 BASE) MCG/ACT inhaler Inhale 2 puffs into the lungs every 6 hours as needed for Wheezing      oxyCODONE-acetaminophen (PERCOCET) 5-325 MG per tablet Take 1 tablet by mouth once.       ibuprofen (ADVIL;MOTRIN) 800 MG tablet Take 800 mg by mouth

## 2019-04-30 NOTE — TELEPHONE ENCOUNTER
E-scribe requesting refill for Keppra 250 mg tab. Please review and e-scribe if applicable.      Last Visit Date: 02/08/19    Next Visit Date:  Future Appointments   Date Time Provider Darlin Soto   5/21/2019 12:50 PM Katia Bennett MD Neuro [facility]   6/14/2019  1:50 PM Katia Bennett MD [facility]

## 2019-09-02 DIAGNOSIS — G40.109 LOCALIZATION-RELATED EPILEPSY (HCC): ICD-10-CM

## 2019-09-04 RX ORDER — LEVETIRACETAM 250 MG/1
TABLET ORAL
Qty: 180 TABLET | Refills: 2 | Status: SHIPPED | OUTPATIENT
Start: 2019-09-04 | End: 2019-12-13 | Stop reason: SDUPTHER

## 2019-12-13 DIAGNOSIS — G40.109 LOCALIZATION-RELATED EPILEPSY (HCC): ICD-10-CM

## 2019-12-13 RX ORDER — LEVETIRACETAM 250 MG/1
TABLET ORAL
Qty: 180 TABLET | Refills: 1 | Status: SHIPPED | OUTPATIENT
Start: 2019-12-13 | End: 2020-02-17

## 2020-02-17 RX ORDER — LEVETIRACETAM 250 MG/1
TABLET ORAL
Qty: 180 TABLET | Refills: 0 | Status: SHIPPED | OUTPATIENT
Start: 2020-02-17 | End: 2020-03-26

## 2020-02-17 NOTE — TELEPHONE ENCOUNTER
Patient calling for refill of levETIRAcetam (KEPPRA) 250 MG      Date of last fill: 12.13.2019    Date of next follow up appointment: N/A    Pt notified response time for refills is 24-48 hours

## 2020-03-26 RX ORDER — LEVETIRACETAM 250 MG/1
TABLET ORAL
Qty: 180 TABLET | Refills: 0 | Status: SHIPPED | OUTPATIENT
Start: 2020-03-26 | End: 2020-05-04

## 2020-03-26 NOTE — TELEPHONE ENCOUNTER
Patient calling for refill of levETIRAcetam (KEPPRA) 250 MG     Date of last fill: 2.17.2020    Date of next follow up appointment: N/A    Pt notified response time for refills is 24-48 hours

## 2020-04-27 VITALS — WEIGHT: 212 LBS | BODY MASS INDEX: 27.97 KG/M2

## 2020-04-28 ENCOUNTER — TELEMEDICINE (OUTPATIENT)
Dept: NEUROLOGY | Age: 45
End: 2020-04-28
Payer: COMMERCIAL

## 2020-04-28 PROCEDURE — G8427 DOCREV CUR MEDS BY ELIG CLIN: HCPCS | Performed by: STUDENT IN AN ORGANIZED HEALTH CARE EDUCATION/TRAINING PROGRAM

## 2020-04-28 PROCEDURE — G8419 CALC BMI OUT NRM PARAM NOF/U: HCPCS | Performed by: STUDENT IN AN ORGANIZED HEALTH CARE EDUCATION/TRAINING PROGRAM

## 2020-04-28 PROCEDURE — 99214 OFFICE O/P EST MOD 30 MIN: CPT | Performed by: STUDENT IN AN ORGANIZED HEALTH CARE EDUCATION/TRAINING PROGRAM

## 2020-04-28 PROCEDURE — 4004F PT TOBACCO SCREEN RCVD TLK: CPT | Performed by: STUDENT IN AN ORGANIZED HEALTH CARE EDUCATION/TRAINING PROGRAM

## 2020-04-28 ASSESSMENT — ENCOUNTER SYMPTOMS
SHORTNESS OF BREATH: 0
CONSTIPATION: 0
SORE THROAT: 0
EYE PAIN: 0
EYE REDNESS: 0
ABDOMINAL PAIN: 0
PHOTOPHOBIA: 0
COUGH: 0
EYE DISCHARGE: 0
DIARRHEA: 0
VOMITING: 0
SINUS PAIN: 0
NAUSEA: 0

## 2020-04-28 NOTE — PROGRESS NOTES
[address] [place] [address], [facility] [address], [facility], [address] [facility], [address] [place] [address]  P: [phone]  F: [phone]       TELEHEALTH VISIT        Pt Name: Scottie Ledezma  MRN: [id number]  YOB: 1975  Date of evaluation: 4/28/2020  Primary Care Physician: Danita Preciado PA-C  Reason for Evaluation: TELEHEALTH EVALUATION -- Audio/Visual (During [facility] public health emergency) and follow up    Interval History 4/28/20  Scottie Ledezma is a 40 y.o.  female  was seen on Tele health for follow up  Patient was last seen in the clinic on 2/8/19  Since last visit denies any recurrence of seizures or seizure-like activity, she is compliant with her medications, taking Keppra 750 mg twice a day, she cannot swallow big pills, taking 250 mg tablets 3 tablets twice a day. Denies any side effects on the medication  She is also taking aspirin 325 mg and Lipitor, she was supposed to follow-up with neuro endovascular Dr. Azul Noel, never followed up since then. Denies any other new neurologic concerns during this visit. Denies any headaches or vision changes or speech difficulty or focal tingling numbness or muscle weakness.         PAST MEDICAL HISTORY:         Diagnosis Date    Bronchitis     with RAD (seen in [place] ER 3 days ago)    Cancer [facility])     cervical cancer at age 22    Cerebral artery occlusion with cerebral infarction [facility])     January 2019    Diabetes mellitus ([address]     Headache(784.0)     Migraines    Seizures ([address]     Type II or unspecified type diabetes mellitus without mention of complication, not stated as uncontrolled         PAST SURGICAL HISTORY:         Procedure Laterality Date    DENTAL SURGERY      x4    HYSTERECTOMY      KNEE CARTILAGE SURGERY Left     OVARY REMOVAL Bilateral     TUBAL LIGATION          SOCIAL HISTORY:     Social History     Socioeconomic History    Marital status:      Spouse name: Not on file    Number of children: Not on file    Years of education: Not on file   [facility] seizures. Negative for dizziness, tremors, syncope, facial asymmetry, speech difficulty, weakness, light-headedness, numbness and headaches. Hematological: Does not bruise/bleed easily. Psychiatric/Behavioral: Positive for decreased concentration and dysphoric mood. Negative for agitation, behavioral problems and hallucinations. PHYSICAL EXAMINATION:                                         .                                                                                                    General appearance: Patient is a well-built and well-nourished, in no acute cardiorespiratory distress. HEENT:  Normocephalic and atraumatic. Neurologic exam:    Mental status: Patient is alert, awake, and oriented to self, place, and time. Able to follow one-step as well as complex commands. Was able to recall major news events well. Higher intellectual functions seem to be with in normal limits. Cranial nerve exam:   Pupils are round. Extraocular movements are intact. Face appears symmetric. Intact hearing   Tongue protrudes midline. Shoulder shrug is intact bilaterally. Motor exam:    Able to move both upper and lower extremities equally well against gravity. No pronator drift noted. Deep tendon reflexes:  Unable to perform deep tendon reflexes. Sensory exam:    Unable to test    Cerebellar exam:    Reveals intact finger-nose-finger testing. No visible abnormal involuntary movements. Gait exam:   Patient is able to stand up with arms crossed, able to do heel walk and toe walk. TESTS AND IMAGING     MRI brain with contrast: 1/16/19  Impression   No acute abnormality.  No abnormal enhancement.         CTA head and neck 1/15/2019  Impression   Left vertebral artery, V4 segment, focal dissection without significant   stenosis.       No acute intracranial arterial abnormality.         2-D Echo 1/16/2019  Summary  Left ventricle is normal in size.  Global left ventricular

## 2020-05-01 NOTE — TELEPHONE ENCOUNTER
E-scribe requesting refill for Keppra. Please review and e-scribe if applicable.      Last Visit Date: 4/28/2020     Next Visit Date:  No future appointments.

## 2020-05-04 ENCOUNTER — HOSPITAL ENCOUNTER (EMERGENCY)
Age: 45
Discharge: HOME | End: 2020-05-04
Attending: EMERGENCY MEDICINE
Payer: COMMERCIAL

## 2020-05-04 ENCOUNTER — TELEPHONE (OUTPATIENT)
Dept: NEUROLOGY | Age: 45
End: 2020-05-04

## 2020-05-04 VITALS
DIASTOLIC BLOOD PRESSURE: 90 MMHG | SYSTOLIC BLOOD PRESSURE: 147 MMHG | HEART RATE: 83 BPM | TEMPERATURE: 98.6 F | OXYGEN SATURATION: 96 % | RESPIRATION RATE: 20 BRPM

## 2020-05-04 LAB
-: NORMAL
ABSOLUTE EOS #: 0.08 K/UL (ref 0–0.44)
ABSOLUTE IMMATURE GRANULOCYTE: 0.03 K/UL (ref 0–0.3)
ABSOLUTE LYMPH #: 1.18 K/UL (ref 1.1–3.7)
ABSOLUTE MONO #: 0.34 K/UL (ref 0.1–1.2)
ALBUMIN SERPL-MCNC: 4 G/DL (ref 3.5–5.2)
ALBUMIN/GLOBULIN RATIO: 1.3 (ref 1–2.5)
ALP BLD-CCNC: 84 U/L (ref 35–104)
ALT SERPL-CCNC: 15 U/L (ref 5–33)
AMORPHOUS: NORMAL
ANION GAP SERPL CALCULATED.3IONS-SCNC: 14 MMOL/L (ref 9–17)
AST SERPL-CCNC: 25 U/L
BACTERIA: NORMAL
BASOPHILS # BLD: 1 % (ref 0–2)
BASOPHILS ABSOLUTE: 0.04 K/UL (ref 0–0.2)
BILIRUB SERPL-MCNC: 0.25 MG/DL (ref 0.3–1.2)
BILIRUBIN URINE: NEGATIVE
BUN BLDV-MCNC: 12 MG/DL (ref 6–20)
BUN/CREAT BLD: 19 (ref 9–20)
CALCIUM SERPL-MCNC: 9.2 MG/DL (ref 8.6–10.4)
CASTS UA: NORMAL /LPF
CHLORIDE BLD-SCNC: 101 MMOL/L (ref 98–107)
CO2: 23 MMOL/L (ref 20–31)
COLOR: YELLOW
COMMENT UA: ABNORMAL
CREAT SERPL-MCNC: 0.63 MG/DL (ref 0.5–0.9)
CRYSTALS, UA: NORMAL /HPF
DIFFERENTIAL TYPE: ABNORMAL
EOSINOPHILS RELATIVE PERCENT: 1 % (ref 1–4)
EPITHELIAL CELLS UA: NORMAL /HPF (ref 0–25)
GFR AFRICAN AMERICAN: >60 ML/MIN
GFR NON-AFRICAN AMERICAN: >60 ML/MIN
GFR SERPL CREATININE-BSD FRML MDRD: ABNORMAL ML/MIN/{1.73_M2}
GFR SERPL CREATININE-BSD FRML MDRD: ABNORMAL ML/MIN/{1.73_M2}
GLUCOSE BLD-MCNC: 301 MG/DL (ref 70–99)
GLUCOSE URINE: ABNORMAL
HCT VFR BLD CALC: 42.4 % (ref 36.3–47.1)
HEMOGLOBIN: 13.6 G/DL (ref 11.9–15.1)
IMMATURE GRANULOCYTES: 0 %
KETONES, URINE: NEGATIVE
LEUKOCYTE ESTERASE, URINE: NEGATIVE
LYMPHOCYTES # BLD: 15 % (ref 24–43)
MCH RBC QN AUTO: 27.9 PG (ref 25.2–33.5)
MCHC RBC AUTO-ENTMCNC: 32.1 G/DL (ref 28.4–34.8)
MCV RBC AUTO: 86.9 FL (ref 82.6–102.9)
MONOCYTES # BLD: 4 % (ref 3–12)
MUCUS: NORMAL
NITRITE, URINE: NEGATIVE
NRBC AUTOMATED: 0 PER 100 WBC
OTHER OBSERVATIONS UA: NORMAL
PDW BLD-RTO: 12.9 % (ref 11.8–14.4)
PH UA: 5.5 (ref 5–9)
PLATELET # BLD: 281 K/UL (ref 138–453)
PLATELET ESTIMATE: ABNORMAL
PMV BLD AUTO: 9.9 FL (ref 8.1–13.5)
POTASSIUM SERPL-SCNC: 4.4 MMOL/L (ref 3.7–5.3)
PROTEIN UA: NEGATIVE
RBC # BLD: 4.88 M/UL (ref 3.95–5.11)
RBC # BLD: ABNORMAL 10*6/UL
RBC UA: NORMAL /HPF (ref 0–2)
RENAL EPITHELIAL, UA: NORMAL /HPF
SEG NEUTROPHILS: 79 % (ref 36–65)
SEGMENTED NEUTROPHILS ABSOLUTE COUNT: 6.34 K/UL (ref 1.5–8.1)
SODIUM BLD-SCNC: 138 MMOL/L (ref 135–144)
SPECIFIC GRAVITY UA: 1.02 (ref 1.01–1.02)
TOTAL PROTEIN: 7 G/DL (ref 6.4–8.3)
TRICHOMONAS: NORMAL
TURBIDITY: CLEAR
URINE HGB: NEGATIVE
UROBILINOGEN, URINE: NORMAL
WBC # BLD: 8 K/UL (ref 3.5–11.3)
WBC # BLD: ABNORMAL 10*3/UL
WBC UA: NORMAL /HPF (ref 0–5)
YEAST: NORMAL

## 2020-05-04 PROCEDURE — 6360000002 HC RX W HCPCS: Performed by: EMERGENCY MEDICINE

## 2020-05-04 PROCEDURE — 96375 TX/PRO/DX INJ NEW DRUG ADDON: CPT

## 2020-05-04 PROCEDURE — 2580000003 HC RX 258: Performed by: EMERGENCY MEDICINE

## 2020-05-04 PROCEDURE — 99284 EMERGENCY DEPT VISIT MOD MDM: CPT

## 2020-05-04 PROCEDURE — 80053 COMPREHEN METABOLIC PANEL: CPT

## 2020-05-04 PROCEDURE — 81001 URINALYSIS AUTO W/SCOPE: CPT

## 2020-05-04 PROCEDURE — 96365 THER/PROPH/DIAG IV INF INIT: CPT

## 2020-05-04 PROCEDURE — 93005 ELECTROCARDIOGRAM TRACING: CPT | Performed by: EMERGENCY MEDICINE

## 2020-05-04 PROCEDURE — 85025 COMPLETE CBC W/AUTO DIFF WBC: CPT

## 2020-05-04 PROCEDURE — 6370000000 HC RX 637 (ALT 250 FOR IP): Performed by: EMERGENCY MEDICINE

## 2020-05-04 RX ORDER — LEVETIRACETAM 250 MG/1
TABLET ORAL
Qty: 180 TABLET | Refills: 0 | Status: SHIPPED | OUTPATIENT
Start: 2020-05-04 | End: 2020-05-07

## 2020-05-04 RX ORDER — ONDANSETRON 2 MG/ML
4 INJECTION INTRAMUSCULAR; INTRAVENOUS ONCE
Status: COMPLETED | OUTPATIENT
Start: 2020-05-04 | End: 2020-05-04

## 2020-05-04 RX ORDER — ACETAMINOPHEN 500 MG
1000 TABLET ORAL ONCE
Status: COMPLETED | OUTPATIENT
Start: 2020-05-04 | End: 2020-05-04

## 2020-05-04 RX ADMIN — LEVETIRACETAM 750 MG: 100 INJECTION, SOLUTION INTRAVENOUS at 11:54

## 2020-05-04 RX ADMIN — ONDANSETRON 4 MG: 2 INJECTION INTRAMUSCULAR; INTRAVENOUS at 11:19

## 2020-05-04 RX ADMIN — ACETAMINOPHEN 1000 MG: 500 TABLET, FILM COATED ORAL at 13:33

## 2020-05-04 ASSESSMENT — ENCOUNTER SYMPTOMS
SHORTNESS OF BREATH: 0
EYE PAIN: 0
ABDOMINAL PAIN: 0

## 2020-05-04 ASSESSMENT — PAIN SCALES - GENERAL
PAINLEVEL_OUTOF10: 10
PAINLEVEL_OUTOF10: 6

## 2020-05-04 ASSESSMENT — PAIN DESCRIPTION - PAIN TYPE: TYPE: ACUTE PAIN

## 2020-05-04 ASSESSMENT — PAIN DESCRIPTION - DESCRIPTORS: DESCRIPTORS: HEADACHE

## 2020-05-04 NOTE — ED PROVIDER NOTES
[facility] ED  eMERGENCY dEPARTMENT eNCOUnter      Pt Name: Nilda Vargas  MRN: [id number]  [place] 1975  Date of evaluation: 5/4/2020  Provider: Alexandria Price MD    [address] [place] [address]     Chief Complaint   Patient presents with    Seizures     had seizure lasted about 1-2 min has not had Keppra since Friday. Had video appt with dr and they were to call in medicine and they did not do it. HISTORY OF PRESENT ILLNESS    Nilda Vargas is a 40 y.o. female who presents to the emergency department for evaluation of seizure. Patient states she has history of seizures. She states she had 1 today lasted approximate 1 to 2 minutes according to EMS. No medications were given. Patient states she has been out of her Keppra for the past 3 days. She states she was unable to get a due to prior authorization needed. Patient saw her neurologist 3 days ago and had this refilled but unable to get it. Patient states she feels at her baseline on arrival.  EMS states she did have a postictal period where she \"seemed out of it\".           PAST MEDICAL HISTORY     Past Medical History:   Diagnosis Date    Bronchitis     with RAD (seen in [place] ER 3 days ago)    Cancer [facility])     cervical cancer at age 22    Cerebral artery occlusion with cerebral infarction [facility])     January 2019    Diabetes mellitus ([address]     Headache(784.0)     Migraines    [facility]ures [facility]     Type II or unspecified type diabetes mellitus without mention of complication, not stated as uncontrolled        SURGICAL HISTORY       Past Surgical History:   Procedure Laterality Date    DENTAL SURGERY      x4    HYSTERECTOMY      KNEE CARTILAGE SURGERY Left     OVARY REMOVAL Bilateral     TUBAL LIGATION         CURRENT MEDICATIONS       Discharge Medication List as of 5/4/2020  1:18 PM      CONTINUE these medications which have NOT CHANGED    Details   levETIRAcetam (KEPPRA) 250 MG tablet TAKE THREE TABLETS BY MOUTH TWICE A DAY, Disp-180 Inability: None    Transportation needs     Medical: None     Non-medical: None   Tobacco Use    Smoking status: Current Every Day Smoker     Packs/day: 0.25     Years: 25.00     Pack years: 6.25     Types: Cigarettes    Smokeless tobacco: Never Used    Tobacco comment: 5 packs a month   Substance and Sexual Activity    Alcohol use: No     Comment: Occ    Drug use: No    Sexual activity: Yes   Lifestyle    Physical activity     Days per week: None     Minutes per session: None    Stress: None   Relationships    Social connections     Talks on phone: None     Gets together: None     Attends Ten[employer] service: None     Active member of club or organization: None     Attends meetings of clubs or organizations: None     Relationship status: None    Intimate partner violence     Fear of current or ex partner: None     Emotionally abused: None     Physically abused: None     Forced sexual activity: None   Other Topics Concern    None   Social History Narrative    None       REVIEW OF SYSTEMS       Review of Systems   Constitutional: Negative for fever. HENT: Negative for congestion. Eyes: Negative for pain. Respiratory: Negative for shortness of breath. Cardiovascular: Negative for chest pain. Gastrointestinal: Negative for abdominal pain. Genitourinary: Negative for dysuria. Skin: Negative for rash. Allergic/Immunologic: Negative for immunocompromised state. Neurological: Positive for seizures. PHYSICAL EXAM    (up to 7 for level 4, 8 or more for level 5)     ED Triage Vitals   BP Temp Temp Source Pulse Resp SpO2 Height Weight   05/04/20 1045 05/04/20 1053 05/04/20 1053 05/04/20 1045 05/04/20 1045 05/04/20 1045 -- --   (!) 145/90 98.6 °F (37 °C) Oral 95 20 96 %         Physical Exam  Vitals signs and nursing note reviewed. Constitutional:       General: She is not in acute distress. Appearance: She is well-developed. HENT:      Head: Normocephalic and atraumatic.    Eyes:

## 2020-05-04 NOTE — ED NOTES
After given tylenol I started discharge preparation. Patient was talking, laughing, and able to walk and carry on full conversation without difficulty or any signes and symptoms of grimacing, moaning, or any other pain.      Reji Love RN  05/04/20 [id number]

## 2020-05-04 NOTE — TELEPHONE ENCOUNTER
Pt states that she has out of her Keppra since the end of last week. Since she has been out, she has been experiencing some shakes and vomiting. Please review and advise.

## 2020-05-05 LAB
EKG ATRIAL RATE: 100 BPM
EKG P AXIS: 58 DEGREES
EKG P-R INTERVAL: 162 MS
EKG Q-T INTERVAL: 348 MS
EKG QRS DURATION: 82 MS
EKG QTC CALCULATION (BAZETT): 448 MS
EKG R AXIS: 73 DEGREES
EKG T AXIS: 23 DEGREES
EKG VENTRICULAR RATE: 100 BPM

## 2020-05-05 PROCEDURE — 93010 ELECTROCARDIOGRAM REPORT: CPT | Performed by: INTERNAL MEDICINE

## 2020-05-06 RX ORDER — LEVETIRACETAM 250 MG/1
750 TABLET ORAL 2 TIMES DAILY
Qty: 180 TABLET | Refills: 3 | Status: SHIPPED | OUTPATIENT
Start: 2020-05-06 | End: 2020-05-07

## 2020-05-06 RX ORDER — LEVETIRACETAM 250 MG/1
250 TABLET ORAL 2 TIMES DAILY
Qty: 180 TABLET | Refills: 0 | Status: CANCELLED | OUTPATIENT
Start: 2020-05-06

## 2020-05-07 ENCOUNTER — HOSPITAL ENCOUNTER (EMERGENCY)
Age: 45
Discharge: HOME | End: 2020-05-07
Attending: EMERGENCY MEDICINE
Payer: COMMERCIAL

## 2020-05-07 ENCOUNTER — APPOINTMENT (OUTPATIENT)
Dept: CT IMAGING | Age: 45
End: 2020-05-07
Payer: COMMERCIAL

## 2020-05-07 VITALS
HEART RATE: 80 BPM | DIASTOLIC BLOOD PRESSURE: 89 MMHG | RESPIRATION RATE: 20 BRPM | HEIGHT: 72 IN | BODY MASS INDEX: 28.71 KG/M2 | SYSTOLIC BLOOD PRESSURE: 137 MMHG | WEIGHT: 212 LBS | OXYGEN SATURATION: 98 % | TEMPERATURE: 99.1 F

## 2020-05-07 LAB
% CKMB: 0.9 % (ref 0–3)
ABSOLUTE EOS #: 0.12 K/UL (ref 0–0.44)
ABSOLUTE IMMATURE GRANULOCYTE: <0.03 K/UL (ref 0–0.3)
ABSOLUTE LYMPH #: 1.83 K/UL (ref 1.1–3.7)
ABSOLUTE MONO #: 0.57 K/UL (ref 0.1–1.2)
ANION GAP SERPL CALCULATED.3IONS-SCNC: 16 MMOL/L (ref 9–17)
BASOPHILS # BLD: 1 % (ref 0–2)
BASOPHILS ABSOLUTE: 0.05 K/UL (ref 0–0.2)
BUN BLDV-MCNC: 12 MG/DL (ref 6–20)
BUN/CREAT BLD: ABNORMAL (ref 9–20)
CALCIUM SERPL-MCNC: 9.6 MG/DL (ref 8.6–10.4)
CHLORIDE BLD-SCNC: 99 MMOL/L (ref 98–107)
CK MB: <1 NG/ML
CKMB INTERPRETATION: ABNORMAL
CO2: 22 MMOL/L (ref 20–31)
CREAT SERPL-MCNC: 0.57 MG/DL (ref 0.5–0.9)
DIFFERENTIAL TYPE: ABNORMAL
EOSINOPHILS RELATIVE PERCENT: 1 % (ref 1–4)
GFR AFRICAN AMERICAN: >60 ML/MIN
GFR NON-AFRICAN AMERICAN: >60 ML/MIN
GFR SERPL CREATININE-BSD FRML MDRD: ABNORMAL ML/MIN/{1.73_M2}
GFR SERPL CREATININE-BSD FRML MDRD: ABNORMAL ML/MIN/{1.73_M2}
GLUCOSE BLD-MCNC: 189 MG/DL (ref 70–99)
HCT VFR BLD CALC: 44.9 % (ref 36.3–47.1)
HEMOGLOBIN: 14.5 G/DL (ref 11.9–15.1)
IMMATURE GRANULOCYTES: 0 %
INR BLD: 0.9
KEPPRA: 4 UG/ML
LYMPHOCYTES # BLD: 21 % (ref 24–43)
MCH RBC QN AUTO: 27.4 PG (ref 25.2–33.5)
MCHC RBC AUTO-ENTMCNC: 32.3 G/DL (ref 28.4–34.8)
MCV RBC AUTO: 84.7 FL (ref 82.6–102.9)
MONOCYTES # BLD: 7 % (ref 3–12)
MYOGLOBIN: <21 NG/ML (ref 25–58)
NRBC AUTOMATED: 0 PER 100 WBC
PARTIAL THROMBOPLASTIN TIME: 26.4 SEC (ref 20.5–30.5)
PDW BLD-RTO: 12.9 % (ref 11.8–14.4)
PLATELET # BLD: 334 K/UL (ref 138–453)
PLATELET ESTIMATE: ABNORMAL
PMV BLD AUTO: 10.3 FL (ref 8.1–13.5)
POTASSIUM SERPL-SCNC: 4.9 MMOL/L (ref 3.7–5.3)
PROTHROMBIN TIME: 9.4 SEC (ref 9–12)
RBC # BLD: 5.3 M/UL (ref 3.95–5.11)
RBC # BLD: ABNORMAL 10*6/UL
SEG NEUTROPHILS: 70 % (ref 36–65)
SEGMENTED NEUTROPHILS ABSOLUTE COUNT: 6.08 K/UL (ref 1.5–8.1)
SODIUM BLD-SCNC: 137 MMOL/L (ref 135–144)
TOTAL CK: 116 U/L (ref 26–192)
TROPONIN INTERP: ABNORMAL
TROPONIN T: ABNORMAL NG/ML
TROPONIN, HIGH SENSITIVITY: <6 NG/L (ref 0–14)
WBC # BLD: 8.7 K/UL (ref 3.5–11.3)
WBC # BLD: ABNORMAL 10*3/UL

## 2020-05-07 PROCEDURE — 70450 CT HEAD/BRAIN W/O DYE: CPT

## 2020-05-07 PROCEDURE — 85730 THROMBOPLASTIN TIME PARTIAL: CPT

## 2020-05-07 PROCEDURE — 85025 COMPLETE CBC W/AUTO DIFF WBC: CPT

## 2020-05-07 PROCEDURE — 84484 ASSAY OF TROPONIN QUANT: CPT

## 2020-05-07 PROCEDURE — 80048 BASIC METABOLIC PNL TOTAL CA: CPT

## 2020-05-07 PROCEDURE — 6360000004 HC RX CONTRAST MEDICATION: Performed by: EMERGENCY MEDICINE

## 2020-05-07 PROCEDURE — 96365 THER/PROPH/DIAG IV INF INIT: CPT

## 2020-05-07 PROCEDURE — 70498 CT ANGIOGRAPHY NECK: CPT

## 2020-05-07 PROCEDURE — 83874 ASSAY OF MYOGLOBIN: CPT

## 2020-05-07 PROCEDURE — 82550 ASSAY OF CK (CPK): CPT

## 2020-05-07 PROCEDURE — 80177 DRUG SCRN QUAN LEVETIRACETAM: CPT

## 2020-05-07 PROCEDURE — 99284 EMERGENCY DEPT VISIT MOD MDM: CPT

## 2020-05-07 PROCEDURE — 85610 PROTHROMBIN TIME: CPT

## 2020-05-07 PROCEDURE — 82553 CREATINE MB FRACTION: CPT

## 2020-05-07 PROCEDURE — 6360000002 HC RX W HCPCS: Performed by: STUDENT IN AN ORGANIZED HEALTH CARE EDUCATION/TRAINING PROGRAM

## 2020-05-07 RX ORDER — LEVETIRACETAM 5 MG/ML
500 INJECTION INTRAVASCULAR ONCE
Status: COMPLETED | OUTPATIENT
Start: 2020-05-07 | End: 2020-05-07

## 2020-05-07 RX ORDER — LEVETIRACETAM 750 MG/1
750 TABLET ORAL 2 TIMES DAILY
Qty: 60 TABLET | Refills: 3 | Status: SHIPPED | OUTPATIENT
Start: 2020-05-07 | End: 2020-05-12

## 2020-05-07 RX ADMIN — IOHEXOL 90 ML: 350 INJECTION, SOLUTION INTRAVENOUS at 17:31

## 2020-05-07 RX ADMIN — LEVETIRACETAM 500 MG: 5 INJECTION INTRAVENOUS at 18:10

## 2020-05-07 ASSESSMENT — ENCOUNTER SYMPTOMS
SHORTNESS OF BREATH: 0
EYE REDNESS: 0
ABDOMINAL PAIN: 0
CHEST TIGHTNESS: 0
EYE DISCHARGE: 0

## 2020-05-07 ASSESSMENT — PAIN DESCRIPTION - ORIENTATION: ORIENTATION: LEFT

## 2020-05-07 ASSESSMENT — PAIN DESCRIPTION - DESCRIPTORS: DESCRIPTORS: ACHING

## 2020-05-07 ASSESSMENT — PAIN SCALES - GENERAL: PAINLEVEL_OUTOF10: 8

## 2020-05-07 ASSESSMENT — PAIN DESCRIPTION - LOCATION: LOCATION: NECK

## 2020-05-07 NOTE — ED NOTES
Pt updated on POC. Waiting for CT scan when available. Will continue to monitor.      Tim Hager RN  05/07/20 [id number]

## 2020-05-07 NOTE — ED PROVIDER NOTES
[address]  ED  Emergency Department Encounter  Emergency Medicine Resident     Pt Name: Rocio Bolanos  MRN: [id number]  [place] 1975  Date of evaluation: 5/7/20  PCP:  Omi Clemons PA-C    CHIEF COMPLAINT       Chief Complaint   Patient presents with    Neck Pain       HISTORY OFPRESENT ILLNESS  (Location/Symptom, Timing/Onset, Context/Setting, Quality, Duration, Modifying Maudine Voodoo.)      Rocio Bolanos is a 40 y.o. female who presents with left sided neck pain. 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 Did not receive stents or any other neuro endovascular intervention. Has been following with Dr. Suzanne Pitt. States she had a seizure 3 days ago and was seen at [facility]. Liang Manzanola with Keppra had no arm numbness and tingling then and was discharged. States she developed worsening left-sided neck pain as well as numbness and tingling of the third fourth and fifth fingers and right foot sensory changes. States her neck pain is an 8 out of 10. Sharp. Nonradiating. Worse with movement. Does not take any anticoagulants or antiplatelets. PAST MEDICAL / SURGICAL / SOCIAL / FAMILY HISTORY      has a past medical history of Bronchitis, Cancer ([address] Cerebral artery occlusion with cerebral infarction ([address] Diabetes mellitus ([address] Headache(784.0), Seizures ([address] and Type II or unspecified type diabetes mellitus without mention of complication, not stated as uncontrolled. has a past surgical history that includes Hysterectomy; Tubal ligation; Knee cartilage surgery (Left); Dental surgery; and Ovary removal (Bilateral).     Social History     Socioeconomic History    Marital status:      Spouse name: Not on file    Number of children: Not on file    Years of education: Not on file    Highest education level: Not on file   Occupational History    Not on file   Social Needs    Financial resource strain: Not on 4/24/19   Quan Wood MD   aspirin (ECPIRIN) 325 MG EC tablet Take 1 tablet by mouth daily 1/17/19   Beulah Rios MD   atorvastatin (LIPITOR) 40 MG tablet Take 1 tablet by mouth nightly  Patient not taking: Reported on 4/27/2020 1/17/19   Beulah Rios MD   famotidine (PEPCID) 20 MG tablet Take 1 tablet by mouth 2 times daily  Patient not taking: Reported on 4/27/2020 1/17/19   Beulah Rios MD   ALPRAZolam Tivis Labrum) 0.25 MG tablet Take 0.25 mg by mouth nightly as needed for Sleep. Stephanie Valladares Historical Provider, MD   sertraline (ZOLOFT) 100 MG tablet Take 200 mg by mouth nightly     Historical Provider, MD   ondansetron (ZOFRAN) 4 MG tablet Take 1 tablet by mouth every 4 hours as needed for Nausea or Vomiting 2/15/18   Max Hogan MD   albuterol sulfate  (90 BASE) MCG/ACT inhaler Inhale 2 puffs into the lungs every 6 hours as needed for Wheezing    Historical Provider, MD   oxyCODONE-acetaminophen (PERCOCET) 5-325 MG per tablet Take 1 tablet by mouth once. Takes only as needed    Historical Provider, MD   ibuprofen (ADVIL;MOTRIN) 800 MG tablet Take 800 mg by mouth every 6 hours as needed for Pain. Historical Provider, MD   diphenhydrAMINE (BENADRYL) 50 MG capsule Take 50 mg by mouth nightly as needed. Historical Provider, MD       REVIEW OF SYSTEMS    (2-9 systems for level 4, 10 or more for level 5)      Review of Systems   Constitutional: Negative for chills and fever. Eyes: Negative for discharge and redness. Respiratory: Negative for chest tightness and shortness of breath. Cardiovascular: Negative for chest pain. Gastrointestinal: Negative for abdominal pain. Genitourinary: Negative for dysuria and flank pain. Musculoskeletal: Positive for neck pain. Negative for myalgias. Skin: Negative for rash. Allergic/Immunologic: Negative for environmental allergies. Neurological: Positive for numbness. Psychiatric/Behavioral: Negative for agitation and confusion.        PHYSICAL EXAM   (up to 7 for level 4, 8 or more for level 5)     INITIAL VITALS:    height is 6' 1\" (1.854 m) and weight is 212 lb (96.2 kg). Her oral temperature is 99.1 °F (37.3 °C). Her blood pressure is 137/89 and her pulse is 80. Her respiration is 20 and oxygen saturation is 98%. Physical Exam  Vitals signs and nursing note reviewed. Constitutional:       Appearance: She is well-developed. HENT:      Head: Normocephalic and atraumatic. Nose: Nose normal.      Mouth/Throat:      Mouth: Mucous membranes are moist.   Eyes:      General: No scleral icterus. Conjunctiva/sclera: Conjunctivae normal.      Pupils: Pupils are equal, round, and reactive to light. Neck:      Musculoskeletal: Neck supple. Trachea: No tracheal deviation. Cardiovascular:      Rate and Rhythm: Normal rate and regular rhythm. Heart sounds: Normal heart sounds. No murmur. No friction rub. No gallop. Pulmonary:      Effort: Pulmonary effort is normal. No respiratory distress. Breath sounds: Normal breath sounds. No wheezing or rales. Abdominal:      General: Bowel sounds are normal. There is no distension. Palpations: Abdomen is soft. There is no mass. Tenderness: There is no abdominal tenderness. There is no guarding or rebound. Musculoskeletal: Normal range of motion. Comments: Tender to palpation over left paraspinal cervical spine   Skin:     General: Skin is warm and dry. Findings: No erythema or rash. Neurological:      Mental Status: She is alert and oriented to person, place, and time.       Comments: No facial asymmetry, tongue midline on protrusion, no aphasia, no dysarthria, EOMI, PERRLA; 5/5 strength in upper and lower extremities b/l; left-sided upper extremity sensory changes in the ulnar distribution, right foot sensory dullness when compared to left   Psychiatric:         Behavior: Behavior normal.         DIFFERENTIAL  DIAGNOSIS     PLAN (Ruben Rho / Mike Burcasey / EKG):  Orders Placed This

## 2020-05-07 NOTE — ED NOTES
Pt resting on cot in NAD. Pt on full monitor. Will continue to monitor.      Gladys Rocha RN  05/07/20 6701

## 2020-05-07 NOTE — ED PROVIDER NOTES
[facility] [address] ED  Emergency Department  Emergency Medicine Resident Sign-out     Care of Salomon Shay was assumed from Dr. Brittany Khan and is being seen for Neck Pain  . The patient's initial evaluation and plan have been discussed with the prior provider who initially evaluated the patient. EMERGENCY DEPARTMENT COURSE / MEDICAL DECISION MAKING:       MEDICATIONS GIVEN:  Orders Placed This Encounter   Medications    levetiracetam (KEPPRA) 500 mg/100 mL IVPB    iohexol (OMNIPAQUE 350) solution 90 mL       LABS / RADIOLOGY:     Labs Reviewed   STROKE PANEL - Abnormal; Notable for the following components:       Result Value    RBC 5.30 (*)     Seg Neutrophils 70 (*)     Lymphocytes 21 (*)     Myoglobin <21 (*)     Glucose 189 (*)     All other components within normal limits   LEVETIRACETAM LEVEL       No results found. RECENT VITALS:     Temp: 99.1 °F (37.3 °C),  Pulse: 80, Resp: 20, BP: 137/89, SpO2: 98 %    This patient is a 40 y.o. Female with Neck pain, reported hxx of vertebral artery dissection V4 segment with no intervention. Had a seizure 3 days prior and evaluated at [facility]. Patient noted gradual worsening of left neck pain similar to previous episode of dissection. Right sided neck pain and tingling to the arm. Patient has not been taking her keppra. Loaded with keppra. F/u CTH and CTA head and neck. Ct Head Wo Contrast    Result Date: 5/7/2020  EXAMINATION: CT OF THE HEAD WITHOUT CONTRAST,  5/7/2020 5:16 pm TECHNIQUE: CT of the head was performed without the administration of intravenous contrast. Dose modulation, iterative reconstruction, and/or weight based adjustment of the mA/kV was utilized to reduce the radiation dose to as low as reasonably achievable. COMPARISON: Prior studies most recent 01/15/2019 and MRI of the brain 01/16/2019.  HISTORY: ORDERING SYSTEM PROVIDED HISTORY: Left upper extremity numbness TECHNOLOGIST PROVIDED HISTORY: Left upper extremity The lung apices demonstrate mild atelectasis. Minimal interlobular septal thickening suggests component of mild edema. Right upper lobe nodule 5 mm in size similar prior exam.. No cervical or superior mediastinal lymphadenopathy. The larynx and pharynx are unremarkable. No acute abnormality of the salivary and thyroid glands. BONES: No acute osseous abnormality. CTA HEAD: ANTERIOR CIRCULATION: No significant stenosis of the intracranial internal carotid, anterior cerebral, or middle cerebral arteries. No aneurysm. POSTERIOR CIRCULATION: No significant stenosis of the vertebral, basilar, or posterior cerebral arteries. No aneurysm. OTHER: No dural venous sinus thrombosis on this non-dedicated study. BRAIN: No mass effect or midline shift. No extra-axial fluid collection. The gray-white differentiation is maintained. No evidence of hemodynamic stenosis or occlusion involving intracranial or cervical arterial circulation. 5 mm right upper lobe nodule, unchanged Fleischner Society guidelines for follow-up and management of incidentally detected pulmonary nodules: Single Solid Nodule: Nodule size less than 6 mm In a low-risk patient, no routine follow-up. In a high-risk patient, optional CT at 12 months. Nodule size equals 6-8 mm In a low-risk patient, CT at 6-12 months, then consider CT at 18-24 months. In a high-risk patient, CT at 6-12 months, then CT at 18-24 months. Nodule size greater than 8 mm In a low-risk patient, consider CT at 3 months, PET/CT, or tissue sampling. In a high-risk patient, consider CT at 3 months, PET/CT, or tissue sampling. Multiple Solid Nodules: Nodule size less than 6 mm In a low-risk patient, no routine follow-up. In a high-risk patient, optional CT at 12 months. Nodule size equals 6-8 mm In a low-risk patient, CT at 3-6 months, then consider CT at 18-24 months. In a high-risk patient, CT at 3-6 months, then CT at 18-24 months.  Nodule size greater than 8 mm In a low-risk patient,

## 2020-05-07 NOTE — ED PROVIDER NOTES
intact. Plan:  CT/CTA  Likely neuro consult  Give keppra dose if not supratherapeutic    This patient was signed out to Dr. Ty Chicas. Please see their note for the remainder of this patient's care.          Adelita Yeung MD   Attending Emergency  Physician             Adelita Yeung MD  05/07/20 9024

## 2020-05-12 ENCOUNTER — TELEPHONE (OUTPATIENT)
Dept: NEUROSURGERY | Age: 45
End: 2020-05-12

## 2020-05-12 RX ORDER — LEVETIRACETAM 250 MG/1
250 TABLET ORAL 2 TIMES DAILY
Qty: 60 TABLET | Refills: 3 | Status: SHIPPED | OUTPATIENT
Start: 2020-05-12 | End: 2020-08-31 | Stop reason: SDUPTHER

## 2020-05-12 RX ORDER — LEVETIRACETAM 500 MG/1
500 TABLET ORAL 2 TIMES DAILY
Qty: 60 TABLET | Refills: 3 | Status: SHIPPED | OUTPATIENT
Start: 2020-05-12 | End: 2020-08-11

## 2020-05-12 RX ORDER — LEVETIRACETAM 250 MG/1
250 TABLET ORAL 2 TIMES DAILY
Qty: 60 TABLET | Refills: 3 | Status: SHIPPED
Start: 2020-05-12 | End: 2020-05-12 | Stop reason: CLARIF

## 2020-05-12 NOTE — TELEPHONE ENCOUNTER
Spoke with pt regarding Keppra. Pt states she is having a hard time taking the 750. Pt was wondering if a 500mg and a 250mg work better than a 750mg tab. Also wants to know if Keppra can come in a SubQ version. Pt is Type 1 and has to do insulin shots and states that would be much better. Please advise.

## 2020-06-05 ENCOUNTER — HOSPITAL ENCOUNTER (OUTPATIENT)
Age: 45
Discharge: HOME | End: 2020-06-05
Payer: COMMERCIAL

## 2020-06-05 LAB
ABSOLUTE EOS #: 0.07 K/UL (ref 0–0.44)
ABSOLUTE IMMATURE GRANULOCYTE: 0.03 K/UL (ref 0–0.3)
ABSOLUTE LYMPH #: 2.62 K/UL (ref 1.1–3.7)
ABSOLUTE MONO #: 0.55 K/UL (ref 0.1–1.2)
BASOPHILS # BLD: 0 % (ref 0–2)
BASOPHILS ABSOLUTE: 0.04 K/UL (ref 0–0.2)
CHOLESTEROL/HDL RATIO: 2.4
CHOLESTEROL: 136 MG/DL
DIFFERENTIAL TYPE: ABNORMAL
EOSINOPHILS RELATIVE PERCENT: 1 % (ref 1–4)
ESTIMATED AVERAGE GLUCOSE: 252 MG/DL
HBA1C MFR BLD: 10.4 % (ref 4.8–5.9)
HCT VFR BLD CALC: 44.9 % (ref 36.3–47.1)
HDLC SERPL-MCNC: 57 MG/DL
HEMOGLOBIN: 14 G/DL (ref 11.9–15.1)
IMMATURE GRANULOCYTES: 0 %
LDL CHOLESTEROL: 66 MG/DL (ref 0–130)
LYMPHOCYTES # BLD: 28 % (ref 24–43)
MCH RBC QN AUTO: 27.1 PG (ref 25.2–33.5)
MCHC RBC AUTO-ENTMCNC: 31.2 G/DL (ref 28.4–34.8)
MCV RBC AUTO: 87 FL (ref 82.6–102.9)
MONOCYTES # BLD: 6 % (ref 3–12)
NRBC AUTOMATED: 0 PER 100 WBC
PDW BLD-RTO: 12.6 % (ref 11.8–14.4)
PLATELET # BLD: 285 K/UL (ref 138–453)
PLATELET ESTIMATE: ABNORMAL
PMV BLD AUTO: 10.5 FL (ref 8.1–13.5)
RBC # BLD: 5.16 M/UL (ref 3.95–5.11)
RBC # BLD: ABNORMAL 10*6/UL
SEG NEUTROPHILS: 65 % (ref 36–65)
SEGMENTED NEUTROPHILS ABSOLUTE COUNT: 6.2 K/UL (ref 1.5–8.1)
TRIGL SERPL-MCNC: 66 MG/DL
VLDLC SERPL CALC-MCNC: NORMAL MG/DL (ref 1–30)
WBC # BLD: 9.5 K/UL (ref 3.5–11.3)
WBC # BLD: ABNORMAL 10*3/UL

## 2020-06-05 PROCEDURE — 36415 COLL VENOUS BLD VENIPUNCTURE: CPT

## 2020-06-05 PROCEDURE — 83036 HEMOGLOBIN GLYCOSYLATED A1C: CPT

## 2020-06-05 PROCEDURE — 85025 COMPLETE CBC W/AUTO DIFF WBC: CPT

## 2020-06-05 PROCEDURE — 80061 LIPID PANEL: CPT

## 2020-08-11 RX ORDER — LEVETIRACETAM 500 MG/1
TABLET ORAL
Qty: 60 TABLET | Refills: 2 | Status: SHIPPED | OUTPATIENT
Start: 2020-08-11 | End: 2020-08-31 | Stop reason: SDUPTHER

## 2020-08-27 NOTE — TELEPHONE ENCOUNTER
Pharmacy called stating new script has to be sent for the patient. In order for the insurance to pay for the medication the script hs to be 250mg take 2x daily. Please advise and send to [facility] [address] [phone] , thank you.

## 2020-08-31 RX ORDER — LEVETIRACETAM 250 MG/1
250 TABLET ORAL 2 TIMES DAILY
Qty: 60 TABLET | Refills: 5 | Status: SHIPPED | OUTPATIENT
Start: 2020-08-31 | End: 2021-01-04

## 2020-08-31 RX ORDER — LEVETIRACETAM 500 MG/1
TABLET ORAL
Qty: 60 TABLET | Refills: 5 | Status: SHIPPED | OUTPATIENT
Start: 2020-08-31 | End: 2021-01-04

## 2020-12-30 NOTE — TELEPHONE ENCOUNTER
E-scribe requesting refill for Keppra. Please review and e-scribe if applicable.      Last Visit Date: 4.28.20 - VV    Next Visit Date:  Future Appointments   Date Time Provider Darlin Brittany   4/28/2021  3:30 PM Samreen Carter MD Neuro [facility]

## 2021-01-04 RX ORDER — LEVETIRACETAM 750 MG/1
TABLET ORAL
Qty: 60 TABLET | Refills: 2 | Status: SHIPPED | OUTPATIENT
Start: 2021-01-04 | End: 2021-02-03 | Stop reason: SDUPTHER

## 2021-02-03 ENCOUNTER — TELEMEDICINE (OUTPATIENT)
Dept: NEUROLOGY | Age: 46
End: 2021-02-03
Payer: COMMERCIAL

## 2021-02-03 DIAGNOSIS — I77.74 VERTEBRAL ARTERY DISSECTION (HCC): ICD-10-CM

## 2021-02-03 DIAGNOSIS — G40.109 LOCALIZATION-RELATED EPILEPSY (HCC): Primary | ICD-10-CM

## 2021-02-03 DIAGNOSIS — Z87.898 HISTORY OF SEIZURE: ICD-10-CM

## 2021-02-03 DIAGNOSIS — I63.29 ACUTE ISCHEMIC VERTEBROBASILAR ARTERY CEREBELLAR STROKE (HCC): ICD-10-CM

## 2021-02-03 PROCEDURE — 99214 OFFICE O/P EST MOD 30 MIN: CPT | Performed by: STUDENT IN AN ORGANIZED HEALTH CARE EDUCATION/TRAINING PROGRAM

## 2021-02-03 RX ORDER — LEVETIRACETAM 750 MG/1
750 TABLET ORAL 2 TIMES DAILY
Qty: 60 TABLET | Refills: 6 | Status: SHIPPED | OUTPATIENT
Start: 2021-02-03 | End: 2021-02-09 | Stop reason: SDUPTHER

## 2021-02-03 RX ORDER — LEVETIRACETAM 750 MG/1
750 TABLET ORAL 2 TIMES DAILY
Qty: 60 TABLET | Refills: 5 | Status: SHIPPED | OUTPATIENT
Start: 2021-02-03 | End: 2021-02-03

## 2021-02-03 ASSESSMENT — ENCOUNTER SYMPTOMS
EYE PAIN: 0
ABDOMINAL PAIN: 0
CONSTIPATION: 0
VOMITING: 0
PHOTOPHOBIA: 0
EYE DISCHARGE: 0
COUGH: 0
SORE THROAT: 0
EYE REDNESS: 0
DIARRHEA: 0
NAUSEA: 0
SHORTNESS OF BREATH: 0
SINUS PAIN: 0

## 2021-02-03 NOTE — PROGRESS NOTES
[address] [place] [address],  [address], [facility] [place] [facility], [facility], [address] [place] [address]  P: [phone]  F: [phone]       TELEHEALTH VISIT        Pt Name: Ivan Mirza  MRN: [id number]  YOB: 1975  Date of evaluation: 2/3/2021  Primary Care Physician: Willow Puckett PA-C  Reason for Evaluation: TELEHEALTH EVALUATION -- Audio/Visual (During ESXI-31 public health emergency) and follow up      Interval History: 2/3/2021  Patient was last seen on telehealth in April 2020. Since last clinic visit she denies any recurrence of seizures or seizure-like activity or episodes of confusion or staring spells. She had only 1 seizure activity in April 2020 when she ran out of [employer] for few days and was not able to refill her medications. As per patient she is compliant with her seizure medications since then. Currently taking Keppra 750 mg twice a day  Denies any other new neurologic concerns during this visit. Denies any other changes in her medications. Denies any new allergies. 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  female  was seen on Tele health for follow up  Patient was last seen in the clinic on 2/8/19  Since last visit denies any recurrence of seizures or seizure-like activity, she is compliant with her medications, taking Keppra 750 mg twice a day, she cannot swallow big pills, taking 250 mg tablets 3 tablets twice a day. Denies any side effects on the medication  She is also taking aspirin 325 mg and Lipitor, she was supposed to follow-up with neuro endovascular Dr. Tuttle Me, never followed up since then. Denies any other new neurologic concerns during this visit. Denies any headaches or vision changes or speech difficulty or focal tingling numbness or muscle weakness.         PAST MEDICAL HISTORY:         Diagnosis Date    Bronchitis     with RAD (seen in [facility] 3 days ago)    Cancer [facility])     cervical cancer at age 22    Cerebral file       CURRENT MEDICATIONS:     Current Outpatient Medications   Medication Sig Dispense Refill    levETIRAcetam (KEPPRA) 750 MG tablet Take 1 tablet by mouth 2 times daily 60 tablet 6    insulin lispro (HUMALOG KWIKPEN) 100 UNIT/ML pen <150 mg /dl 0 Units  151-200 2 Units  201-250 4 Units  251-300 6 Units  301-350 8 Units  351-400 10 Units  >400 12 Units 3 pen 1    insulin glargine (LANTUS) 100 UNIT/ML injection vial Inject 30 Units into the skin daily (Patient taking differently: Inject 40 Units into the skin daily ) 1 vial 3    aspirin (ECPIRIN) 325 MG EC tablet Take 1 tablet by mouth daily 30 tablet 3    atorvastatin (LIPITOR) 40 MG tablet Take 1 tablet by mouth nightly 30 tablet 3    ALPRAZolam (XANAX) 0.25 MG tablet Take 0.25 mg by mouth nightly as needed for Sleep. Shakila Penaloza sertraline (ZOLOFT) 100 MG tablet Take 200 mg by mouth nightly       ondansetron (ZOFRAN) 4 MG tablet Take 1 tablet by mouth every 4 hours as needed for Nausea or Vomiting 15 tablet 0    albuterol sulfate  (90 BASE) MCG/ACT inhaler Inhale 2 puffs into the lungs every 6 hours as needed for Wheezing      oxyCODONE-acetaminophen (PERCOCET) 5-325 MG per tablet Take 1 tablet by mouth once. Takes only as needed      ibuprofen (ADVIL;MOTRIN) 800 MG tablet Take 800 mg by mouth every 6 hours as needed for Pain.  diphenhydrAMINE (BENADRYL) 50 MG capsule Take 50 mg by mouth nightly as needed. No current facility-administered medications for this visit. ALLERGIES:     Allergies   Allergen Reactions    Bee Venom Hives    Other Rash     Bandaids  Artificial Sweetners    Tape Ryan Limber Tape] Rash                               REVIEW OF SYSTEMS    Review of Systems   Constitutional: Negative for chills, diaphoresis, fever and unexpected weight change. HENT: Negative for congestion, ear discharge, ear pain, hearing loss, sinus pain and sore throat.     Eyes: Negative for photophobia, pain, discharge, redness and up with arms crossed, able to do heel walk and toe walk. TESTS AND IMAGING     MRI brain with contrast: 1/16/19  Impression   No acute abnormality.  No abnormal enhancement.         CTA head and neck 1/15/2019  Impression   Left vertebral artery, V4 segment, focal dissection without significant   stenosis.       No acute intracranial arterial abnormality.         2-D Echo 1/16/2019  Summary  Left ventricle is normal in size. Global left ventricular systolic function  is normal. Estimated ejection fraction is 55 % . Trivial valve disease as below. ASSESSMENT AND PLAN:       This is a 39 y.o. female who was seen on Telehealth for follow up       Ann Marie Howard is a 37 y.o.  female with PMH significant for Cervical cancer, diabetes, headaches was seen in the clinic for seizures.     · Seizures:  Unclear etiology: Routine EEG concerning for mild focal cerebral dysfunction in the right frontotemporal region, rare right frontotemporal sharp waves seen at T4 along with frontotemporal slowing on the right side. - Continue Keppra to 750 mg twice a day. - Long-term video EEG monitoring for 3 days: completed,   - MRI brain with and without contrast with epilepsy protocol reviewed  - Seizure Precautions:   counseled the patient with regard to seizure precautions for injury prevention and reinforced their rationale. No driving for at least 6 months, no activity at dangerous heights, no operation of dangerous machinery, no baths, no swimming. Caution (preferably accompanied) with cooking or near fires. The patient expressed understanding.   - Follow up in the clinic in 6 months  - Instructed patient to call the clinic if symptoms worsen or develop any new symptoms.         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   Patient seen by neuro endovascular team.  Patient on Aspirin 325 mg and  Lipitor 40 mg.   - Continue Aspirin 325 mg and  Lipitor 40 mg  - Patient will follow up in [place] [id number] clinic with  Dr Nupur Das   - Smoking cessation was discussed with patient. - Stroke risk factors management as per PCP  - Education regarding secondary stroke prevention was provided.            Other Medical conditions:  · History of migraine headaches  · History of cervical cancer  · Diabetes    Medication Counseling: risks and benefits including possible adverse effects discussed with the patient and he verbalized understanding of those instructions. Personally reviewed imaging with patients and all questions answered. Pt voiced understanding. Patient agreed with treatment plan. Electronically signed by Tati Razo MD on 2/3/2021 at 3:57 PM   Neurology   [facility] 22.    Please note that this chart was generated using voice recognition Dragon dictation software. Although every effort was made to ensure the accuracy of this automated transcription, some errors in transcription may have occurred. This is a telehealth visit that was performed with the originating site at Patient Location: home and Provider Location of [place], [place]. Verbal consent to participate in video visit was obtained. Pursuant to the emergency declaration under the [facility], [facility] waiver authority and the [employer] and [employer] Act, this Virtual Visit was conducted, with patient's consent, to reduce the patient's risk of exposure to COVID-19 and provide continuity of care for an established/new patient. Services were provided through a video synchronous discussion virtually to substitute for in-person clinic visit.  I discussed with the patient the nature of our telehealth visits via interactive/real-time audio/video that:  - I would evaluate the patient and recommend diagnostics and treatments based on my assessment  - Our sessions are not being recorded and that personal health information is protected  - Our team would provide follow up care in person if/when the patient needs it.

## 2021-02-09 RX ORDER — LEVETIRACETAM 750 MG/1
750 TABLET ORAL 2 TIMES DAILY
Qty: 60 TABLET | Refills: 6 | Status: SHIPPED | OUTPATIENT
Start: 2021-02-09 | End: 2021-08-03 | Stop reason: SDUPTHER

## 2021-02-09 NOTE — TELEPHONE ENCOUNTER
[facility] called requesting a refill of Keppra for pt. Please review and e-scribe if applicable. Last Visit Date: 2.3.21 - VV.     Next Visit Date:  Future Appointments   Date Time Provider Darlin Soto   8/3/2021  1:30 PM Day Dawson MD Neuro [facility]   8/30/2021  2:30 PM Day Dawson MD Neuro [facility]

## 2021-04-27 ENCOUNTER — APPOINTMENT (OUTPATIENT)
Dept: GENERAL RADIOLOGY | Age: 46
End: 2021-04-27
Payer: COMMERCIAL

## 2021-04-27 ENCOUNTER — HOSPITAL ENCOUNTER (EMERGENCY)
Age: 46
Discharge: HOME | End: 2021-04-27
Attending: FAMILY MEDICINE
Payer: COMMERCIAL

## 2021-04-27 VITALS
HEIGHT: 72 IN | DIASTOLIC BLOOD PRESSURE: 92 MMHG | BODY MASS INDEX: 28.44 KG/M2 | RESPIRATION RATE: 16 BRPM | TEMPERATURE: 98 F | HEART RATE: 87 BPM | SYSTOLIC BLOOD PRESSURE: 128 MMHG | WEIGHT: 210 LBS | OXYGEN SATURATION: 97 %

## 2021-04-27 DIAGNOSIS — R07.89 CHEST WALL PAIN: Primary | ICD-10-CM

## 2021-04-27 PROCEDURE — 71046 X-RAY EXAM CHEST 2 VIEWS: CPT

## 2021-04-27 PROCEDURE — 6370000000 HC RX 637 (ALT 250 FOR IP): Performed by: FAMILY MEDICINE

## 2021-04-27 PROCEDURE — 99283 EMERGENCY DEPT VISIT LOW MDM: CPT

## 2021-04-27 RX ORDER — CYCLOBENZAPRINE HCL 10 MG
10 TABLET ORAL 3 TIMES DAILY PRN
Qty: 20 TABLET | Refills: 0 | Status: SHIPPED | OUTPATIENT
Start: 2021-04-27 | End: 2021-05-07

## 2021-04-27 RX ORDER — KETOROLAC TROMETHAMINE 10 MG/1
10 TABLET, FILM COATED ORAL EVERY 6 HOURS PRN
Qty: 20 TABLET | Refills: 0 | Status: SHIPPED | OUTPATIENT
Start: 2021-04-27

## 2021-04-27 ASSESSMENT — ENCOUNTER SYMPTOMS
GASTROINTESTINAL NEGATIVE: 1
EYES NEGATIVE: 1
ALLERGIC/IMMUNOLOGIC NEGATIVE: 1

## 2021-04-27 ASSESSMENT — PAIN DESCRIPTION - PROGRESSION: CLINICAL_PROGRESSION: NOT CHANGED

## 2021-04-27 ASSESSMENT — PAIN DESCRIPTION - PAIN TYPE: TYPE: ACUTE PAIN

## 2021-04-27 NOTE — ED PROVIDER NOTES
[facility] ED  EMERGENCY DEPARTMENT ENCOUNTER      Pt Name: Jose Coelho  MRN: [id number]  [place] 1975  Date of evaluation: 4/27/2021  Provider: Crystal Mart MD    CHIEF COMPLAINT     Chief Complaint   Patient presents with    Rib Injury     3year old grandson elbow to chest         HISTORY OF PRESENT ILLNESS   (Location/Symptom, Timing/Onset, Context/Setting,Quality, Duration, Modifying Factors, Severity)  Note limiting factors. Jose Coelho is a37 y.o. female who presents to the emergency department      Is a 39years old female presented to ER complaining of midsternal chest pain. She states that she has a grandson who is 3years old and he was watching TV between her and her  at some point child wanted to sit up and go pick something up and pressed his elbow against her sternum causing instantaneous pain and a crunching sensation. Ever since then her pain is gotten worse. She does report some shortness of breath and some increased pain with coughing, sneezing, certain positions. She states that she cannot lay down because of the pain gets excruciating. She reports the pain to be stabbing and cramping. She rates it a 7 out of 10 at this time. Nursing Notes werereviewed. REVIEW OF SYSTEMS    (2-9 systems for level 4, 10 or more for level 5)     Review of Systems   Constitutional: Negative. HENT: Negative. Eyes: Negative. Respiratory:        Sternal chest pain with deep inspiration and certain movements. Cardiovascular: Negative. Gastrointestinal: Negative. Endocrine: Negative. Genitourinary: Negative. Musculoskeletal: Negative. Allergic/Immunologic: Negative. Neurological: Negative. Hematological: Negative. Negative for adenopathy. Psychiatric/Behavioral: Negative. Except as noted above the remainder of the review of systems was reviewed and negative.        PAST MEDICAL HISTORY     Past Medical History:   Diagnosis Date    Bronchitis     with RAD (seen in [place] ER 3 days ago)    Cancer [facility])     cervical cancer at age 22    Cerebral artery occlusion with cerebral infarction [facility])     January 2019    Diabetes mellitus ([facility] [address]     Headache(784.0)     Migraines    Seizures ([facility]     Type II or unspecified type diabetes mellitus without mention of complication, not stated as uncontrolled          SURGICALHISTORY       Past Surgical History:   Procedure Laterality Date    DENTAL SURGERY      x4    HYSTERECTOMY      KNEE CARTILAGE SURGERY Left     OVARY REMOVAL Bilateral     TUBAL LIGATION           CURRENT MEDICATIONS       Previous Medications    ALBUTEROL SULFATE  (90 BASE) MCG/ACT INHALER    Inhale 2 puffs into the lungs every 6 hours as needed for Wheezing    ALPRAZOLAM (XANAX) 0.25 MG TABLET    Take 0.25 mg by mouth nightly as needed for Sleep. .    ASPIRIN (ECPIRIN) 325 MG EC TABLET    Take 1 tablet by mouth daily    ATORVASTATIN (LIPITOR) 40 MG TABLET    Take 1 tablet by mouth nightly    DIPHENHYDRAMINE (BENADRYL) 50 MG CAPSULE    Take 50 mg by mouth nightly as needed. IBUPROFEN (ADVIL;MOTRIN) 800 MG TABLET    Take 800 mg by mouth every 6 hours as needed for Pain. INSULIN GLARGINE (LANTUS) 100 UNIT/ML INJECTION VIAL    Inject 30 Units into the skin daily    INSULIN LISPRO (HUMALOG KWIKPEN) 100 UNIT/ML PEN    <150 mg /dl 0 Units  151-200 2 Units  201-250 4 Units  251-300 6 Units  301-350 8 Units  351-400 10 Units  >400 12 Units    LEVETIRACETAM (KEPPRA) 750 MG TABLET    Take 1 tablet by mouth 2 times daily    ONDANSETRON (ZOFRAN) 4 MG TABLET    Take 1 tablet by mouth every 4 hours as needed for Nausea or Vomiting    OXYCODONE-ACETAMINOPHEN (PERCOCET) 5-325 MG PER TABLET    Take 1 tablet by mouth once.  Takes only as needed    SERTRALINE (ZOLOFT) 100 MG TABLET    Take 200 mg by mouth nightly             Bee venom, Other, and Tape [adhesive tape]    FAMILY HISTORY       Family History   Problem Relation moist.   Eyes:      Pupils: Pupils are equal, round, and reactive to light. Neck:      Musculoskeletal: Normal range of motion. Cardiovascular:      Rate and Rhythm: Normal rate. Pulses: Normal pulses. Heart sounds: Normal heart sounds. Pulmonary:      Effort: No respiratory distress. Breath sounds: No stridor. No wheezing, rhonchi or rales. Comments: Tender palpation midsternal with guarding. Chest:      Chest wall: Tenderness present. Abdominal:      General: Bowel sounds are normal.   Musculoskeletal: Normal range of motion. Skin:     General: Skin is warm. Capillary Refill: Capillary refill takes less than 2 seconds. Neurological:      General: No focal deficit present. Mental Status: She is alert. DIAGNOSTIC RESULTS     EKG: All EKG's are interpreted by the Emergency Department Physician who either signs orCo-signs this chart in the absence of a cardiologist.        RADIOLOGY:   plain film images such as CT, Ultrasound and MRI are read by the radiologist. Plain radiographic images are visualized and preliminarily interpreted by the emergency physician with the below findings:        Interpretation per the Radiologist below, ifavailable at the time of this note:    XR CHEST (2 VW)   Final Result   Negative chest.               ED BEDSIDE ULTRASOUND:   Performed by ED Physician - none    LABS:  Labs Reviewed - No data to display    All other labs were within normal range ornot returned as of this dictation. EMERGENCY DEPARTMENT COURSE and DIFFERENTIAL DIAGNOSIS/MDM:   Vitals:    Vitals:    04/27/21 1341   BP: (!) 128/92   Pulse: 87   Resp: 16   Weight: 210 lb (95.3 kg)   Height: 6' 2\" (1.88 m)           MDM  Number of Diagnoses or Management Options  Diagnosis management comments: Patient with appears to be chest wall pain due to contusion. We will asked the patient to continue take NSAIDs I will prescribe some Toradol along with some muscle relaxers.   She received a few narcotic medications namely Norco for extreme pain control. She will follow up with her PCP as needed. CRITICAL CARE TIME   Total CriticalCare time was  minutes, excluding separately reportable procedures. There was a high probability of clinically significant/life threatening deterioration in the patient's condition which required my urgent intervention. CONSULTS:  None    PROCEDURES:  Unlessotherwise noted below, none     Procedures    FINAL IMPRESSION      1.  Chest wall pain          DISPOSITION/PLAN   DISPOSITION Decision To Discharge 04/27/2021 02:33:52 PM      PATIENT REFERRED TO:  your Md    In 2 days        DISCHARGE MEDICATIONS:  New Prescriptions    CYCLOBENZAPRINE (FLEXERIL) 10 MG TABLET    Take 1 tablet by mouth 3 times daily as needed for Muscle spasms    KETOROLAC (TORADOL) 10 MG TABLET    Take 1 tablet by mouth every 6 hours as needed for Pain              (Please note that portions of this note were completed with a voice recognition program.  Efforts were made to edit the dictations but occasionally words are mis-transcribed.)      Adenike Ness MD (electronically signed)  Attending Emergency Physician            Adenike Ness MD  04/27/21 8034

## 2021-08-03 ENCOUNTER — TELEMEDICINE (OUTPATIENT)
Dept: NEUROLOGY | Age: 46
End: 2021-08-03
Payer: COMMERCIAL

## 2021-08-03 DIAGNOSIS — G40.109 LOCALIZATION-RELATED EPILEPSY (HCC): ICD-10-CM

## 2021-08-03 DIAGNOSIS — I63.29 ACUTE ISCHEMIC VERTEBROBASILAR ARTERY CEREBELLAR STROKE (HCC): ICD-10-CM

## 2021-08-03 DIAGNOSIS — I77.74 VERTEBRAL ARTERY DISSECTION (HCC): Primary | ICD-10-CM

## 2021-08-03 DIAGNOSIS — Z87.898 HISTORY OF SEIZURE: ICD-10-CM

## 2021-08-03 PROCEDURE — 99214 OFFICE O/P EST MOD 30 MIN: CPT | Performed by: STUDENT IN AN ORGANIZED HEALTH CARE EDUCATION/TRAINING PROGRAM

## 2021-08-03 RX ORDER — PYRIDOXINE HCL (VITAMIN B6) 50 MG
50 TABLET ORAL DAILY
Qty: 30 TABLET | Refills: 3 | Status: SHIPPED | OUTPATIENT
Start: 2021-08-03 | End: 2022-08-03

## 2021-08-03 RX ORDER — LEVETIRACETAM 750 MG/1
750 TABLET ORAL 2 TIMES DAILY
Qty: 60 TABLET | Refills: 5 | Status: SHIPPED | OUTPATIENT
Start: 2021-08-03 | End: 2022-02-03 | Stop reason: SDUPTHER

## 2021-08-03 ASSESSMENT — ENCOUNTER SYMPTOMS
ABDOMINAL PAIN: 0
CONSTIPATION: 0
EYE PAIN: 0
COUGH: 0
DIARRHEA: 0
NAUSEA: 0
SINUS PAIN: 0
VOMITING: 0
PHOTOPHOBIA: 0
EYE DISCHARGE: 0
SHORTNESS OF BREATH: 0
EYE REDNESS: 0
SORE THROAT: 0

## 2021-08-03 NOTE — PROGRESS NOTES
[address] [place] [address],  [address], [facility], [facility], [address] [place] [address]  P: [phone]  F: [phone]       TELEHEALTH VISIT        Pt Name: Akash Velasquez  MRN: [id number]  YOB: 1975  Date of evaluation: 8/3/2021  Primary Care Physician: No primary care provider on file. Reason for Evaluation: TELEHEALTH EVALUATION -- Audio/Visual (During [facility]- public health emergency) and follow up      Interval History: 8/3/2021  Patient was last seen in telehealth in February 2021. Since last clinic visit patient denies any recurrence of seizures or seizure-like activity or staring spells or episodes of confusion. She is compliant with her Keppra 750 mg twice a day. Denies any side effects from Marianela Parkers. She was started on pyridoxine by her psychiatrist.  Otherwise denies any other new neurologic concerns during this visit. Denies any other changes in her medications. Denies any new allergies. Interval History: 2/3/2021  Patient was last seen on telehealth in April 2020. Since last clinic visit she denies any recurrence of seizures or seizure-like activity or episodes of confusion or staring spells. She had only 1 seizure activity in April 2020 when she ran out of Marianela Parkers for few days and was not able to refill her medications. As per patient she is compliant with her seizure medications since then. Currently taking Keppra 750 mg twice a day  Denies any other new neurologic concerns during this visit. Denies any other changes in her medications. Denies any new allergies. 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  female  was seen on Tele [facility] follow up  Patient was last seen in the clinic on 2/8/19  Since last visit denies any recurrence of seizures or seizure-like activity, she is compliant with her medications, taking Keppra 750 mg twice a day, she cannot swallow big pills, taking 250 mg tablets 3 tablets twice a day.   Denies any side effects on the medication  She is also taking aspirin 325 mg and Lipitor, she was supposed to follow-up with neuro endovascular Dr. Al Jo, never followed up since then. Denies any other new neurologic concerns during this visit. Denies any headaches or vision changes or speech difficulty or focal tingling numbness or muscle weakness. PAST MEDICAL HISTORY:         Diagnosis Date    Bronchitis     with RAD (seen in [place] ER 3 days ago)    Cancer [facility])     cervical cancer at age 22    Cerebral artery occlusion with cerebral infarction [facility])     January 2019    Diabetes mellitus ([facility] [address]     Headache(784.0)     Migraines    Seizures ([facility] [address]     Type II or unspecified type diabetes mellitus without mention of complication, not stated as uncontrolled         PAST SURGICAL HISTORY:         Procedure Laterality Date    DENTAL SURGERY      x4    HYSTERECTOMY      KNEE CARTILAGE SURGERY Left     OVARY REMOVAL Bilateral     TUBAL LIGATION          SOCIAL HISTORY:     Social History     Socioeconomic History    Marital status:      Spouse name: Not on file    Number of children: Not on file    Years of education: Not on file    Highest education level: Not on file   Occupational History    Not on file   Tobacco Use    Smoking status: Current Every Day Smoker     Packs/day: 0.25     Years: 25.00     Pack years: 6.25     Types: Cigarettes    Smokeless tobacco: Never Used    Tobacco comment: 5 packs a month   Vaping Use    Vaping Use: Former    Substances: Unknown   Substance and Sexual Activity    Alcohol use: No     Comment:  Occ    Drug use: No    Sexual activity: Yes   Other Topics Concern    Not on file   Social History Narrative    Not on file     Social Determinants of Health     Financial Resource Strain:     Difficulty of Paying Living Expenses:    Food Insecurity:     Worried About Running Out of Food in the Last Year:     [address] in the Last Year: Transportation Needs:     Lack of Transportation (Medical):  Lack of Transportation (Non-Medical):    Physical Activity:     Days of Exercise per Week:     Minutes of Exercise per Session:    Stress:     Feeling of Stress :    Social Connections:     Frequency of Communication with Friends and Family:     Frequency of Social Gatherings with Friends and Family:     Attends Jew Services:     Active Member of Clubs or Organizations:     Attends Club or Organization Meetings:     Marital Status:    Intimate Partner Violence:     Fear of Current or Ex-Partner:     Emotionally Abused:     Physically Abused:     Sexually Abused:        CURRENT MEDICATIONS:     Current Outpatient Medications   Medication Sig Dispense Refill    levETIRAcetam (KEPPRA) 750 MG tablet Take 1 tablet by mouth 2 times daily 60 tablet 5    pyridoxine (RA VITAMIN B-6) 50 MG tablet Take 1 tablet by mouth daily 30 tablet 3    ketorolac (TORADOL) 10 MG tablet Take 1 tablet by mouth every 6 hours as needed for Pain 20 tablet 0    insulin lispro (HUMALOG KWIKPEN) 100 UNIT/ML pen <150 mg /dl 0 Units  151-200 2 Units  201-250 4 Units  251-300 6 Units  301-350 8 Units  351-400 10 Units  >400 12 Units 3 pen 1    insulin glargine (LANTUS) 100 UNIT/ML injection vial Inject 30 Units into the skin daily (Patient taking differently: Inject 40 Units into the skin daily ) 1 vial 3    aspirin (ECPIRIN) 325 MG EC tablet Take 1 tablet by mouth daily 30 tablet 3    atorvastatin (LIPITOR) 40 MG tablet Take 1 tablet by mouth nightly 30 tablet 3    ALPRAZolam (XANAX) 0.25 MG tablet Take 0.25 mg by mouth nightly as needed for Sleep. Jeana All sertraline (ZOLOFT) 100 MG tablet Take 200 mg by mouth nightly       ondansetron (ZOFRAN) 4 MG tablet Take 1 tablet by mouth every 4 hours as needed for Nausea or Vomiting 15 tablet 0    albuterol sulfate  (90 BASE) MCG/ACT inhaler Inhale 2 puffs into the lungs every 6 hours as needed for Wheezing      oxyCODONE-acetaminophen (PERCOCET) 5-325 MG per tablet Take 1 tablet by mouth once. Takes only as needed      ibuprofen (ADVIL;MOTRIN) 800 MG tablet Take 800 mg by mouth every 6 hours as needed for Pain.  diphenhydrAMINE (BENADRYL) 50 MG capsule Take 50 mg by mouth nightly as needed. No current facility-administered medications for this visit. ALLERGIES:     Allergies   Allergen Reactions    Bee Venom Hives    Other Rash     Bandaids  Artificial Sweetners    Tape Aashish Fried Tape] Rash                               REVIEW OF SYSTEMS    Review of Systems   Constitutional: Negative for chills, diaphoresis, fever and unexpected weight change. HENT: Negative for congestion, ear discharge, ear pain, hearing loss, sinus pain and sore throat. Eyes: Negative for photophobia, pain, discharge, redness and visual disturbance. Respiratory: Negative for cough and shortness of breath. Cardiovascular: Negative for chest pain, palpitations and leg swelling. Gastrointestinal: Negative for abdominal pain, constipation, diarrhea, nausea and vomiting. Endocrine: Negative for polydipsia and polyuria. Genitourinary: Negative for difficulty urinating and hematuria. Musculoskeletal: Negative for neck pain. Skin: Negative for pallor and rash. Neurological: Negative for dizziness, tremors, seizures, syncope, facial asymmetry, speech difficulty, weakness, light-headedness, numbness and headaches. Hematological: Does not bruise/bleed easily. Psychiatric/Behavioral: Negative for agitation, behavioral problems, decreased concentration, dysphoric mood and hallucinations. PHYSICAL EXAMINATION:                                         .                                                                                                    General appearance: Patient is a well-built and well-nourished, in no acute cardiorespiratory distress. HEENT:  Normocephalic and atraumatic. pyridoxine 50 mg daily.  - Long-term video EEG monitoring for 3 days: completed,   - MRI brain with and without contrast with epilepsy protocol reviewed  - Seizure Precautions:   counseled the patient with regard to seizure precautions for injury prevention and reinforced their rationale. No driving for at least 6 months, no activity at dangerous heights, no operation of dangerous machinery, no baths, no swimming. Caution (preferably accompanied) with cooking or near fires. The patient expressed understanding.   - Follow up in the clinic in 6 months  - Instructed patient to call the clinic if symptoms worsen or develop any new symptoms.         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 Patient seen by neuro endovascular team.  Patient on Aspirin 325 mg and  Lipitor 40 mg.   - Continue Aspirin 325 mg and  Lipitor 40 mg. Patient was not taking Lipitor since last visit, ordered lipid panel, goal LDL less than 70.  -Follow-up with neuro endovascular as scheduled. - Smoking cessation was discussed with patient. - Stroke risk factors management as per PCP  - Education regarding secondary stroke prevention was provided.            Other Medical conditions:  · History of migraine headaches  · History of cervical cancer  · Diabetes        Electronically signed by Stacy Weeks MD on 8/3/2021 at 1:49 PM   Neurology   [facility]    Please note that this chart was generated using voice recognition Dragon dictation software. Although every effort was made to ensure the accuracy of this automated transcription, some errors in transcription may have occurred. This is a telehealth visit that was performed with the originating site at Patient Location: home and Provider Location of [place], [place]. Verbal consent to participate in video visit was obtained.    Pursuant to the emergency declaration under the [address] and the National Emergencies Act, [facility] waiver authority and the [employer] Act, this Virtual Visit was conducted, with patient's consent, to reduce the patient's risk of exposure to COVID-19 and provide continuity of care for an established/new patient. Services were provided through a video synchronous discussion virtually to substitute for in-person clinic visit. I discussed with the patient the nature of our telehealth visits via interactive/real-time audio/video that:  - I would evaluate the patient and recommend diagnostics and treatments based on my assessment  - Our sessions are not being recorded and that personal health information is protected  - Our team would provide follow up care in person if/when the patient needs it.

## 2021-10-14 NOTE — PATIENT INSTRUCTIONS
Schedule a Vaccine  When you qualify to receive the vaccine, call the [facility]) COVID-19 Vaccination Hotline to schedule your appointment or to get additional information about the [facility]) locations which are offering the COVID-19 vaccine. To be 94% effective, it's important that you receive two doses of one of the COVID-19 vaccines. -If you are receiving the Davalos Peter vaccine, your second shot will be scheduled as close to 21 days after the first shot as possible. -If you are receiving the Moderna vaccine, your second shot will be scheduled as close to 28 days after the first shot as possible. [facility]) COVID-19 Vaccination Hotline: [phone]    Links to [facility]) website and [facility] website:    [employer]/[email or web address]    [email or web address]

## 2022-02-03 ENCOUNTER — VIRTUAL VISIT (OUTPATIENT)
Dept: NEUROLOGY | Age: 47
End: 2022-02-03
Payer: COMMERCIAL

## 2022-02-03 DIAGNOSIS — I63.9 CEREBROVASCULAR ACCIDENT (CVA), UNSPECIFIED MECHANISM (HCC): Primary | ICD-10-CM

## 2022-02-03 DIAGNOSIS — I77.74 DISSECTING HEMORRHAGE OF LEFT VERTEBRAL ARTERY (HCC): ICD-10-CM

## 2022-02-03 DIAGNOSIS — Z87.898 HISTORY OF SEIZURE: ICD-10-CM

## 2022-02-03 PROCEDURE — G8427 DOCREV CUR MEDS BY ELIG CLIN: HCPCS | Performed by: STUDENT IN AN ORGANIZED HEALTH CARE EDUCATION/TRAINING PROGRAM

## 2022-02-03 PROCEDURE — 99213 OFFICE O/P EST LOW 20 MIN: CPT | Performed by: STUDENT IN AN ORGANIZED HEALTH CARE EDUCATION/TRAINING PROGRAM

## 2022-02-03 RX ORDER — LEVETIRACETAM 750 MG/1
750 TABLET ORAL 2 TIMES DAILY
Qty: 60 TABLET | Refills: 5 | Status: SHIPPED | OUTPATIENT
Start: 2022-02-03 | End: 2022-07-20

## 2022-02-03 ASSESSMENT — ENCOUNTER SYMPTOMS
EYE REDNESS: 0
ABDOMINAL PAIN: 0
VOMITING: 0
WHEEZING: 0
EYE DISCHARGE: 0
SORE THROAT: 0
APNEA: 0
NAUSEA: 0
SHORTNESS OF BREATH: 0
ABDOMINAL DISTENTION: 0
DIARRHEA: 0
STRIDOR: 0
CONSTIPATION: 0
COUGH: 0
CHEST TIGHTNESS: 0

## 2022-02-03 NOTE — PROGRESS NOTES
[address] [place] [address]  [facility] # [address] [id number]  Dept: [phone]  Dept Fax: [phone]    NEUROLOGY NEW PATIENT NOTE       PATIENT NAME: Minh Lira  PATIENT MRN: [id number]  PRIMARY CARE PHYSICIAN: No primary care provider on file. HPI:      Minh Lira is a 55 y.o. female who is being seen by myself for the first time. She was previously seeing Dr. 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 Sometimes has an aura of a panic attack. Postictal for a few days. Has some paraphasic errors and expressive aphasia. Complains of daily \"stuttering\". Interval History: 2/3/22  Last seizure was when she ran out of her LEV 4/2020. Reports spacing out for 1-2 minutes daily that has been going on for years. No focal weakness, numbness or tingling. HgA1c uncontrolled. Interval History: 8/3/2021  Patient was last seen in telehealth in February 2021. Since last clinic visit patient denies any recurrence of seizures or seizure-like activity or staring spells or episodes of confusion. She is compliant with her Keppra 750 mg twice a day. Denies any side effects from Euphoria App. She was started on pyridoxine by her psychiatrist.  Otherwise denies any other new neurologic concerns during this visit. Denies any other changes in her medications. Denies any new allergies.          Interval History: 2/3/2021  Patient was last seen on telehealth in April 2020. Since last clinic visit she denies any recurrence of seizures or seizure-like activity or episodes of confusion or staring spells. She had only 1 seizure activity in April 2020 when she ran out of Euphoria App for few days and was not able to refill her medications. As per patient she is compliant with her seizure medications since then.   Currently taking Keppra 750 mg twice a day  Denies any other new neurologic concerns during this visit. Denies any other changes in her medications. Denies any new allergies.     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  female  was seen on [facility] [employer] for follow up  Patient was last seen in the clinic on 2/8/19  Since last visit denies any recurrence of seizures or seizure-like activity, she is compliant with her medications, taking Keppra 750 mg twice a day, she cannot swallow big pills, taking 250 mg tablets 3 tablets twice a day. Denies any side effects on the medication  She is also taking aspirin 325 mg and Lipitor, she was supposed to follow-up with neuro endovascular Dr. Мария Mai, never followed up since then. Denies any other new neurologic concerns during this visit. Denies any headaches or vision changes or speech difficulty or focal tingling numbness or muscle weakness.   PREVIOUS WORKUP:     Lab Results   Component Value Date    WBC 9.5 06/05/2020    HGB 14.0 06/05/2020    HCT 44.9 06/05/2020    MCV 87.0 06/05/2020     06/05/2020       Past Medical History:   Diagnosis Date    Bronchitis     with RAD (seen in [facility] 3 days ago)    Cancer [facility])     cervical cancer at age 22    Cerebral artery occlusion with cerebral infarction [facility])     January 2019    Diabetes mellitus ([address]     Headache(784.0)     Migraines    Seizures ([address]     Type II or unspecified type diabetes mellitus without mention of complication, not stated as uncontrolled         Past Surgical History:   Procedure Laterality Date    DENTAL SURGERY      x4    HYSTERECTOMY      KNEE CARTILAGE SURGERY Left     OVARY REMOVAL Bilateral     TUBAL LIGATION          Social History     Socioeconomic History    Marital status:      Spouse name: Not on file    Number of children: Not on file    Years of education: Not on file    Highest education level: Not on file   Occupational History    Not on file   Tobacco Use    Smoking status: Current Every Day Smoker     Packs/day: 0.25     Years: 25.00     Pack years: 6.25     Types: Cigarettes    Smokeless tobacco: Never Used    Tobacco comment: 5 packs a month   Vaping Use    Vaping Use: Former    Substances: Unknown   Substance and Sexual Activity    Alcohol use: No     Comment: Occ    Drug use: No    Sexual activity: Yes   Other Topics Concern    Not on file   Social History Narrative    Not on file     Social Determinants of Health     Financial Resource Strain:     Difficulty of Paying Living Expenses: Not on file   Food Insecurity:     Worried About Running Out of Food in the Last Year: Not on file    Slime of Food in the Last Year: Not on file   Transportation Needs:     Lack of Transportation (Medical): Not on file    Lack of Transportation (Non-Medical):  Not on file   Physical Activity:     Days of Exercise per Week: Not on file    Minutes of Exercise per Session: Not on file   Stress:     Feeling of Stress : Not on file   Social Connections:     Frequency of Communication with Friends and Family: Not on file    Frequency of Social Gatherings with Friends and Family: Not on file    Attends [employer] Services: Not on file    Active Member of [address] [place] [address] [place] or Organizations: Not on file    Attends Club or Organization Meetings: Not on file    Marital Status: Not on file   Intimate Partner Violence:     Fear of Current or Ex-Partner: Not on file    Emotionally Abused: Not on file    Physically Abused: Not on file    Sexually Abused: Not on file   Housing Stability:     Unable to Pay for Housing in the Last Year: Not on file    Number of [place] in the Last Year: Not on file    Unstable Housing in the Last Year: Not on file        Current Outpatient Medications   Medication Sig Dispense Refill    pyridoxine (RA VITAMIN B-6) 50 MG tablet Take 1 tablet by mouth daily 30 tablet 3    ketorolac (TORADOL) 10 MG tablet Take 1 tablet by mouth every 6 hours as needed for Pain 20 tablet 0    insulin lispro (HUMALOG KWIKPEN) 100 UNIT/ML pen <150 mg /dl 0 Units  151-200 2 Units  201-250 4 Units  251-300 6 Units  301-350 8 Units  351-400 10 Units  >400 12 Units 3 pen 1    insulin glargine (LANTUS) 100 UNIT/ML injection vial Inject 30 Units into the skin daily (Patient taking differently: Inject 40 Units into the skin daily ) 1 vial 3    aspirin (ECPIRIN) 325 MG EC tablet Take 1 tablet by mouth daily 30 tablet 3    ALPRAZolam (XANAX) 0.25 MG tablet Take 0.25 mg by mouth nightly as needed for Sleep. Drema  sertraline (ZOLOFT) 100 MG tablet Take 200 mg by mouth nightly       albuterol sulfate  (90 BASE) MCG/ACT inhaler Inhale 2 puffs into the lungs every 6 hours as needed for Wheezing      oxyCODONE-acetaminophen (PERCOCET) 5-325 MG per tablet Take 1 tablet by mouth once. Takes only as needed      ibuprofen (ADVIL;MOTRIN) 800 MG tablet Take 800 mg by mouth every 6 hours as needed for Pain.  diphenhydrAMINE (BENADRYL) 50 MG capsule Take 50 mg by mouth nightly as needed.  levETIRAcetam (KEPPRA) 750 MG tablet Take 1 tablet by mouth 2 times daily 60 tablet 5    ondansetron (ZOFRAN) 4 MG tablet Take 1 tablet by mouth every 4 hours as needed for Nausea or Vomiting 15 tablet 0     No current facility-administered medications for this visit. Allergies   Allergen Reactions    Bee Venom Hives    Other Rash     Bandaids  Artificial Sweetners    Tape Gala Journey Tape] Rash        REVIEW OF SYSTEMS:     Review of Systems   Constitutional: Negative for activity change, appetite change, chills, diaphoresis, fatigue and fever. HENT: Negative for sore throat. Eyes: Negative for discharge and redness. Respiratory: Negative for apnea, cough, chest tightness, shortness of breath, wheezing and stridor. Cardiovascular: Negative for chest pain and leg swelling.    Gastrointestinal: Negative for abdominal distention, abdominal pain, constipation, diarrhea, nausea and vomiting. Genitourinary: Negative for difficulty urinating, dysuria, flank pain, frequency, hematuria and urgency. Musculoskeletal: Negative for arthralgias. Neurological: Positive for dizziness. Negative for tremors, seizures, syncope, facial asymmetry, speech difficulty, weakness, light-headedness, numbness and headaches. Hematological: Negative for adenopathy. VITALS  There were no vitals taken for this visit. PHYSICAL EXAMINATION:       Constitutional: [x] Appears well-developed and well-nourished [x] No apparent distress      [] Abnormal-   Mental status  [x] Alert and awake  [x] Oriented to person/place/time [x]Able to follow commands      Eyes:  EOM    [x]  Normal  [] Abnormal-  Sclera  [x]  Normal  [] Abnormal -         Discharge [x]  None visible  [] Abnormal -    HENT:   [x] Normocephalic, atraumatic.   [] Abnormal   [x] Mouth/Throat: Mucous membranes are moist.     External Ears [x] Normal  [] Abnormal-     Neck: [x] No visualized mass     Pulmonary/Chest: [x] Respiratory effort normal.  [x] No visualized signs of difficulty breathing or respiratory distress        [] Abnormal-      Musculoskeletal:   [x] Normal gait with no signs of ataxia         [x] Normal range of motion of neck        [] Abnormal-     Neurological:        [x] No Facial Asymmetry (Cranial nerve 7 motor function) (limited exam to video visit)          [x] No gaze palsy        [] Abnormal-         Skin:        [x] No significant exanthematous lesions or discoloration noted on facial skin         [] Abnormal-            Psychiatric:       [x] Normal Affect [] No Hallucinations        [] Abnormal-         IMAGING:       MRI brain with contrast: 1/16/19  Impression   No acute abnormality.  No abnormal enhancement.         CTA head and neck 1/15/2019  Impression   Left vertebral artery, V4 segment, focal dissection without significant stenosis.       No acute intracranial arterial abnormality.         2-D Echo 1/16/2019  Summary  Left ventricle is normal in size. Global left ventricular systolic function  is normal. Estimated ejection fraction is 55 % . Trivial valve disease as below. ASSESSMENT:      Case of a 38 yo F with a PMH sig for cervical cancer, T1DM, headaches and is being seen by our clinic for seizures. Prior EEG showing focal cerebral dysfunction in the right frontotemporal region with rare right frontotemporal sharp waves at T4 as well as right frontotemporal slowing on the right side. PLAN:     1. C/w  BID, refill today. 2. C/w 325 mg ASA qD, has bruising with insulin SQ injections, otherwise tolerating well. 3. 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 MRI Brain WO showing an infarct in the left cerebellum. Complains of feeling off balance still. 5. repeat CTA Neck W  6. Taking pyridoxine 50 mg qD, managed by her psychiatrist , recommended due to higher dose of Zoloft 200 mg. She reports taking 100 mg OTC due to availability. 7. Repeat EEG, last EEG 9/29/2018 showing rare right frontotemporal sharp waves at T4 as well as right frontotemporal slowing on the right side  8. Seizure precautions discussed   9. RTC in 4 weeks after imaging studies and labs are completed. Ms. Ariadna Villavicencio received counseling on the following healthy behaviors: medical compliance, smoking cessation, blood pressure control, regular follow up with primary doctor. Electronically signed by       Thad Kimble MD, [place]LEY  PGY-4 Neurology   2/3/2022 at 3:06 PM    This is a telehealth visit that was performed with the originating site at Patient Location: Patient Home and Provider Location of [facility]. Patient ID verified by me prior to start of this visit. Verbal consent to participate in video visit was obtained.  Pursuant to the emergency declaration under the [facility], On license of [facility] waiver authority and the [employer] Act, this Virtual Visit was conducted, with patient's consent, to reduce the patient's risk of exposure to COVID-19 and provide continuity of care for an established/new patient. Services were provided through a video synchronous discussion virtually to substitute for in-person clinic visit. I discussed with the patient the nature of our telehealth visits via interactive/real-time audio/video that:  - I would evaluate the patient and recommend diagnostics and treatments based on my assessment  - Our sessions are not being recorded and that personal health information is protected  - Our team would provide follow up care in person if/when the patient needs it.

## 2022-02-03 NOTE — PATIENT INSTRUCTIONS
1. Continue taking Keppra 750 mg twice daily  2. Continue with the daily aspirin  3. Please get the MRI Brain and the CTA Neck completed   4. Repeat the EEG test  5. Follow up with neuroendovascular Dr. Jack Skiff  6. Please complete blood work  7. Seizure precautions discussed   8. Refills sent      Medication side effects were discussed and questions were answered. In addition to seizure medications, therapeutic lifestyle changes may also reduce the likelihood of seizures. These modifications include avoiding excessive alcohol use, obtaining adequate and appropriate sleep, and reducing stress. Dietary changes such as, a modified Atkins diet (high fat and protein/low carbohydrate), may reduce the risk of seizures as well. Seizure and epilepsy safety was also outlined. Specifically, we reviewed that she would not be able to operate a motor vehicle in the state of [place] until she has been seizure free for 6 months. Other restrictions were reviewed including operating heavy or electrical machinery, swimming alone, navigating heights, or engaging in any activity that may be injurious to her or others if there was an unexpected loss of consciousness or loss of function. It was explained to the patient that chronic use of some seizure medications may lead to osteoporosis or osteopenia. Daily supplementation of Vitamin D and calcium was encouraged along with a bone density study to determine overall bone health. The potential risks from seizures including aspiration pneumonia, vertebral compression fracture, shoulder/mandible dislocation, head trauma, burns, drowning, and death (SUDEP) were briefly reviewed. The potential for neuronal injury from prolonged seizures or status epilepticus was discussed.

## 2022-02-14 ENCOUNTER — HOSPITAL ENCOUNTER (OUTPATIENT)
Dept: NEUROLOGY | Age: 47
Discharge: HOME | End: 2022-02-14
Payer: COMMERCIAL

## 2022-02-14 DIAGNOSIS — Z87.898 HISTORY OF SEIZURE: ICD-10-CM

## 2022-02-14 DIAGNOSIS — I77.74 DISSECTING HEMORRHAGE OF LEFT VERTEBRAL ARTERY (HCC): ICD-10-CM

## 2022-02-14 DIAGNOSIS — I63.9 CEREBROVASCULAR ACCIDENT (CVA), UNSPECIFIED MECHANISM (HCC): ICD-10-CM

## 2022-02-14 PROCEDURE — 95819 EEG AWAKE AND ASLEEP: CPT

## 2022-02-18 NOTE — PROCEDURES
[address], [place] [address]                          ELECTROENCEPHALOGRAM REPORT    PATIENT NAME: Ganesh Bedoya                    :        1975  MED REC NO:   [id number]                              ROOM:  ACCOUNT NO:   [de-identified]                           ADMIT DATE: 2022  PROVIDER:     Darleen Cabot    DATE OF EE2022    REASON FOR STUDY:  The patient is a 51-year-old lady with a history of  seizures, memory loss, and tremors. SUMMARY:  This EEG was recorded with standard international 10-20  montages. The predominant background rhythm comprises of well-formed, symmetrically distributed,  alpha activity of 8 to 10 Hz in the awake state early in the recording. There is no focal slowing. No epileptiform activity. Cardiac rhythm is  normal.  No photic driving. Hyperventilation was not remarkable. There is intermittent drowsiness in latter half of the recording. INTERPRETATION:  Normal awake and drowsy EEG with noepileptogenic epileptiform  activity        Lossie [place]    D: 2022 18:25:24       T: 2022 18:27:49     [email or web address]  Job#: [id number]     Doc#: [id number]    CC:   Adenike Winters

## 2022-02-21 ENCOUNTER — HOSPITAL ENCOUNTER (OUTPATIENT)
Age: 47
Discharge: HOME | End: 2022-02-21
Payer: COMMERCIAL

## 2022-02-21 ENCOUNTER — APPOINTMENT (OUTPATIENT)
Dept: MRI IMAGING | Age: 47
End: 2022-02-21
Payer: COMMERCIAL

## 2022-02-21 ENCOUNTER — HOSPITAL ENCOUNTER (OUTPATIENT)
Dept: CT IMAGING | Age: 47
Discharge: HOME | End: 2022-02-23
Payer: COMMERCIAL

## 2022-02-21 DIAGNOSIS — I77.74 DISSECTING HEMORRHAGE OF LEFT VERTEBRAL ARTERY (HCC): ICD-10-CM

## 2022-02-21 DIAGNOSIS — I63.9 CEREBROVASCULAR ACCIDENT (CVA), UNSPECIFIED MECHANISM (HCC): Primary | ICD-10-CM

## 2022-02-21 DIAGNOSIS — I63.9 CEREBROVASCULAR ACCIDENT (CVA), UNSPECIFIED MECHANISM (HCC): ICD-10-CM

## 2022-02-21 DIAGNOSIS — Z87.898 HISTORY OF SEIZURE: ICD-10-CM

## 2022-02-21 LAB
BUN BLDV-MCNC: 11 MG/DL (ref 6–20)
CREAT SERPL-MCNC: 0.75 MG/DL (ref 0.5–0.9)
GFR AFRICAN AMERICAN: >60 ML/MIN
GFR NON-AFRICAN AMERICAN: >60 ML/MIN
GFR SERPL CREATININE-BSD FRML MDRD: NORMAL ML/MIN/{1.73_M2}
GFR SERPL CREATININE-BSD FRML MDRD: NORMAL ML/MIN/{1.73_M2}

## 2022-02-21 PROCEDURE — 36415 COLL VENOUS BLD VENIPUNCTURE: CPT

## 2022-02-21 PROCEDURE — 6360000004 HC RX CONTRAST MEDICATION: Performed by: STUDENT IN AN ORGANIZED HEALTH CARE EDUCATION/TRAINING PROGRAM

## 2022-02-21 PROCEDURE — 82565 ASSAY OF CREATININE: CPT

## 2022-02-21 PROCEDURE — 70498 CT ANGIOGRAPHY NECK: CPT

## 2022-02-21 PROCEDURE — 84520 ASSAY OF UREA NITROGEN: CPT

## 2022-02-21 PROCEDURE — 80177 DRUG SCRN QUAN LEVETIRACETAM: CPT

## 2022-02-21 RX ADMIN — IOPAMIDOL 75 ML: 755 INJECTION, SOLUTION INTRAVENOUS at 12:20

## 2022-02-22 LAB — KEPPRA: 36 UG/ML

## 2022-03-03 ENCOUNTER — TELEMEDICINE (OUTPATIENT)
Dept: NEUROLOGY | Age: 47
End: 2022-03-03
Payer: COMMERCIAL

## 2022-03-03 DIAGNOSIS — I77.9 VERTEBRAL ARTERY DISEASE (HCC): ICD-10-CM

## 2022-03-03 DIAGNOSIS — G40.909 SEIZURE DISORDER (HCC): Primary | ICD-10-CM

## 2022-03-03 DIAGNOSIS — H53.9 VISION CHANGES: ICD-10-CM

## 2022-03-03 DIAGNOSIS — Z86.73 HISTORY OF STROKE INVOLVING CEREBELLUM: ICD-10-CM

## 2022-03-03 PROCEDURE — 99212 OFFICE O/P EST SF 10 MIN: CPT | Performed by: STUDENT IN AN ORGANIZED HEALTH CARE EDUCATION/TRAINING PROGRAM

## 2022-03-03 PROCEDURE — G8428 CUR MEDS NOT DOCUMENT: HCPCS | Performed by: STUDENT IN AN ORGANIZED HEALTH CARE EDUCATION/TRAINING PROGRAM

## 2022-03-03 ASSESSMENT — ENCOUNTER SYMPTOMS
NAUSEA: 0
EYE REDNESS: 0
ABDOMINAL PAIN: 0
EYE DISCHARGE: 0
WHEEZING: 0
ABDOMINAL DISTENTION: 0
CONSTIPATION: 0
APNEA: 0
DIARRHEA: 0
VOMITING: 0
SHORTNESS OF BREATH: 0
COUGH: 0
SORE THROAT: 0
CHEST TIGHTNESS: 0
STRIDOR: 0

## 2022-03-03 NOTE — PROGRESS NOTES
[address] [place] [address], [facility] [address]  [facility] [address] [id number]  Dept: [phone]  Dept Fax: [phone]    NEUROLOGY NEW PATIENT NOTE       PATIENT NAME: Donna Gomez  PATIENT MRN: [id number]  PRIMARY CARE PHYSICIAN: No primary care provider on file. HPI:      Donna Gomez is a 55 y.o. female who is being seen by myself for the first time. She was previously seeing Dr. 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 Sometimes has an aura of a panic attack. Postictal for a few days. Has some paraphasic errors and expressive aphasia. Complains of daily \"stuttering\". Interval History: 2/3/22  Last seizure was when she ran out of her LEV 4/2020. Reports spacing out for 1-2 minutes daily that has been going on for years. No focal weakness, numbness or tingling. HgA1c uncontrolled. Interval History: 8/3/2021  Patient was last seen in telehealth in February 2021. Since last clinic visit patient denies any recurrence of seizures or seizure-like activity or staring spells or episodes of confusion. She is compliant with her Keppra 750 mg twice a day. Denies any side effects from IPM [place]. She was started on pyridoxine by her psychiatrist.  Otherwise denies any other new neurologic concerns during this visit. Denies any other changes in her medications. Denies any new allergies.          Interval History: 2/3/2021  Patient was last seen on telehealth in April 2020. Since last clinic visit she denies any recurrence of seizures or seizure-like activity or episodes of confusion or staring spells. She had only 1 seizure activity in April 2020 when she ran out of IPM [place] for few days and was not able to refill her medications. As per patient she is compliant with her seizure medications since then.   Currently taking Keppra 750 mg twice a day  Denies any other new neurologic concerns during this visit. Denies any other changes in her medications. Denies any new allergies.     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  female  was seen on [employer] for follow up  Patient was last seen in the clinic on 2/8/19  Since last visit denies any recurrence of seizures or seizure-like activity, she is compliant with her medications, taking Keppra 750 mg twice a day, she cannot swallow big pills, taking 250 mg tablets 3 tablets twice a day. Denies any side effects on the medication  She is also taking aspirin 325 mg and Lipitor, she was supposed to follow-up with neuro endovascular Dr. Angelita Johnson, never followed up since then. Denies any other new neurologic concerns during this visit. Denies any headaches or vision changes or speech difficulty or focal tingling numbness or muscle weakness. 3/3/2022:   No seizures in interim. Last sz April 2020 when she ran out of meds. No dizziness or any other complaint at this time. CTA neck negative for vascular injury. MRI pending.            PREVIOUS WORKUP:     Lab Results   Component Value Date    WBC 9.5 06/05/2020    HGB 14.0 06/05/2020    HCT 44.9 06/05/2020    MCV 87.0 06/05/2020     06/05/2020       Past Medical History:   Diagnosis Date    Bronchitis     with RAD (seen in [facility] 3 days ago)    Cancer [facility])     cervical cancer at age 22    Cerebral artery occlusion with cerebral infarction [facility])     January 2019    Diabetes mellitus ([address]     Headache(784.0)     Migraines    Seizures ([facility]     Type II or unspecified type diabetes mellitus without mention of complication, not stated as uncontrolled         Past Surgical History:   Procedure Laterality Date    DENTAL SURGERY      x4    HYSTERECTOMY      KNEE CARTILAGE SURGERY Left     OVARY REMOVAL Bilateral     TUBAL LIGATION          Social History     Socioeconomic History    Marital status:      Spouse name: Not on file    Number of children: Not on file    Years of education: Not on file    Highest education level: Not on file   Occupational History    Not on file   Tobacco Use    Smoking status: Current Every Day Smoker     Packs/day: 0.25     Years: 25.00     Pack years: 6.25     Types: Cigarettes    Smokeless tobacco: Never Used    Tobacco comment: 5 packs a month   Vaping Use    Vaping Use: Former    Substances: Unknown   Substance and Sexual Activity    Alcohol use: No     Comment: Occ    Drug use: No    Sexual activity: Yes   Other Topics Concern    Not on file   Social History Narrative    Not on file     Social Determinants of Health     Financial Resource Strain:     Difficulty of Paying Living Expenses: Not on file   Food Insecurity:     Worried About Running Out of Food in the Last Year: Not on file    Slime of Food in the Last Year: Not on file   Transportation Needs:     Lack of Transportation (Medical): Not on file    Lack of Transportation (Non-Medical):  Not on file   Physical Activity:     Days of Exercise per Week: Not on file    Minutes of Exercise per Session: Not on file   Stress:     Feeling of Stress : Not on file   Social Connections:     Frequency of Communication with Friends and Family: Not on file    Frequency of Social Gatherings with Friends and Family: Not on file    Attends Druze Services: Not on file    Active Member of Clubs or Organizations: Not on file    Attends Club or Organization Meetings: Not on file    Marital Status: Not on file   Intimate Partner Violence:     Fear of Current or Ex-Partner: Not on file    Emotionally Abused: Not on file    Physically Abused: Not on file    Sexually Abused: Not on file   Housing Stability:     Unable to Pay for Housing in the Last Year: Not on file    Number of [place] in the Last Year: Not on file    Unstable Housing in the Last Year: Not on file        Current Outpatient Medications   Medication Sig Dispense Refill    levETIRAcetam (KEPPRA) 750 MG tablet Take 1 tablet by mouth 2 times daily 60 tablet 5    pyridoxine (RA VITAMIN B-6) 50 MG tablet Take 1 tablet by mouth daily 30 tablet 3    ketorolac (TORADOL) 10 MG tablet Take 1 tablet by mouth every 6 hours as needed for Pain 20 tablet 0    insulin lispro (HUMALOG KWIKPEN) 100 UNIT/ML pen <150 mg /dl 0 Units  151-200 2 Units  201-250 4 Units  251-300 6 Units  301-350 8 Units  351-400 10 Units  >400 12 Units 3 pen 1    insulin glargine (LANTUS) 100 UNIT/ML injection vial Inject 30 Units into the skin daily (Patient taking differently: Inject 40 Units into the skin daily ) 1 vial 3    aspirin (ECPIRIN) 325 MG EC tablet Take 1 tablet by mouth daily 30 tablet 3    ALPRAZolam (XANAX) 0.25 MG tablet Take 0.25 mg by mouth nightly as needed for Sleep. Kreg Gloss sertraline (ZOLOFT) 100 MG tablet Take 200 mg by mouth nightly       ondansetron (ZOFRAN) 4 MG tablet Take 1 tablet by mouth every 4 hours as needed for Nausea or Vomiting 15 tablet 0    albuterol sulfate  (90 BASE) MCG/ACT inhaler Inhale 2 puffs into the lungs every 6 hours as needed for Wheezing      oxyCODONE-acetaminophen (PERCOCET) 5-325 MG per tablet Take 1 tablet by mouth once. Takes only as needed      ibuprofen (ADVIL;MOTRIN) 800 MG tablet Take 800 mg by mouth every 6 hours as needed for Pain.  diphenhydrAMINE (BENADRYL) 50 MG capsule Take 50 mg by mouth nightly as needed. No current facility-administered medications for this visit. Allergies   Allergen Reactions    Bee Venom Hives    Other Rash     Bandaids  Artificial Sweetners    Tape Ken Maninder Tape] Rash        REVIEW OF SYSTEMS:     Review of Systems   Constitutional: Negative for activity change, appetite change, chills, diaphoresis, fatigue and fever. HENT: Negative for sore throat. Eyes: Negative for discharge and redness. Respiratory: Negative for apnea, cough, chest tightness, shortness of breath, wheezing and stridor. Cardiovascular: Negative for chest pain and leg swelling. Gastrointestinal: Negative for abdominal distention, abdominal pain, constipation, diarrhea, nausea and vomiting. Genitourinary: Negative for difficulty urinating, dysuria, flank pain, frequency, hematuria and urgency. Musculoskeletal: Negative for arthralgias. Neurological: Negative for dizziness, tremors, seizures, syncope, facial asymmetry, speech difficulty, weakness, light-headedness, numbness and headaches. Hematological: Negative for adenopathy. VITALS  There were no vitals taken for this visit. PHYSICAL EXAMINATION:       Constitutional: [x] Appears well-developed and well-nourished [x] No apparent distress      [] Abnormal-   Mental status  [x] Alert and awake  [x] Oriented to person/place/time [x]Able to follow commands      Eyes:  EOM    [x]  Normal  [] Abnormal-  Sclera  [x]  Normal  [] Abnormal -         Discharge [x]  None visible  [] Abnormal -    HENT:   [x] Normocephalic, atraumatic.   [] Abnormal   [x] Mouth/Throat: Mucous membranes are moist.     External Ears [x] Normal  [] Abnormal-     Neck: [x] No visualized mass     Pulmonary/Chest: [x] Respiratory effort normal.  [x] No visualized signs of difficulty breathing or respiratory distress        [] Abnormal-      Musculoskeletal:   [x] Normal gait with no signs of ataxia         [x] Normal range of motion of neck        [] Abnormal-     Neurological:        [x] No Facial Asymmetry (Cranial nerve 7 motor function) (limited exam to video visit)          [x] No gaze palsy        [] Abnormal-         Skin:        [x] No significant exanthematous lesions or discoloration noted on facial skin         [] Abnormal-            Psychiatric:       [x] Normal Affect [] No Hallucinations        [] Abnormal-         IMAGING:       MRI brain with contrast: 1/16/19  Impression Punctate acute infarct involving the left cerebellar hemisphere.  No   significant mass effect.              CTA head and neck 1/15/2019  Impression   Left vertebral artery, V4 segment, focal dissection without significant   stenosis.       No acute intracranial arterial abnormality.         2-D Echo 1/16/2019  Summary  Left ventricle is normal in size. Global left ventricular systolic function  is normal. Estimated ejection fraction is 55 % . Trivial valve disease as below. ASSESSMENT:      Case of a 38 yo F with a PMH sig for cervical cancer, T1DM, headaches and is being seen by our clinic for seizures. Prior EEG showing focal cerebral dysfunction in the right frontotemporal region with rare right frontotemporal sharp waves at T4 as well as right frontotemporal slowing on the right side. PLAN:     1. Antidepressants may lower seizure threshold as a whole, recommend to c/w  mg PO BID  2. Decrease ASA to 81 mg qD. SHe has a follow up with EVNS at the end of this month. 3. 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 MRI Brain WO showing an infarct in the left cerebellum. No longer c/o feeling off balance. 5. repeat CTA Neck W negative for dissection   6. Taking pyridoxine 50 mg qD, managed by her psychiatrist , recommended due to higher dose of Zoloft 200 mg. She reports taking 100 mg OTC due to availability. 7. Repeat EEG, last EEG 9/29/2018 showing rare right frontotemporal sharp waves at T4 as well as right frontotemporal slowing on the right side  8. Seizure precautions discussed   9. RTC in 6 months, OK for VV    Ms. Azul Nino received counseling on the following healthy behaviors: medical compliance, smoking cessation, blood pressure control, regular follow up with primary doctor.         Electronically signed by       Rob Faria MD, WESLEY  PGY-4 Neurology   3/3/2022 at 1:17 PM    This is a telehealth visit that was performed with the originating site at Patient Location: Patient Home and Provider Location of [place], [place]. Patient ID verified by me prior to start of this visit. Verbal consent to participate in video visit was obtained. Pursuant to the emergency declaration under the [facility], [facility] waiver authority and the [employer] Act, this Virtual Visit was conducted, with patient's consent, to reduce the patient's risk of exposure to COVID-19 and provide continuity of care for an established/new patient. Services were provided through a video synchronous discussion virtually to substitute for in-person clinic visit. I discussed with the patient the nature of our telehealth visits via interactive/real-time audio/video that:  - I would evaluate the patient and recommend diagnostics and treatments based on my assessment  - Our sessions are not being recorded and that personal health information is protected  - Our team would provide follow up care in person if/when the patient needs it.

## 2022-05-05 ENCOUNTER — OFFICE VISIT (OUTPATIENT)
Dept: NEUROLOGY | Age: 47
End: 2022-05-05
Payer: COMMERCIAL

## 2022-05-05 VITALS
SYSTOLIC BLOOD PRESSURE: 128 MMHG | BODY MASS INDEX: 28.44 KG/M2 | OXYGEN SATURATION: 98 % | WEIGHT: 210 LBS | HEIGHT: 72 IN | DIASTOLIC BLOOD PRESSURE: 84 MMHG | HEART RATE: 75 BPM

## 2022-05-05 DIAGNOSIS — I77.74 VERTEBRAL ARTERY DISSECTION (HCC): Primary | ICD-10-CM

## 2022-05-05 PROCEDURE — G8427 DOCREV CUR MEDS BY ELIG CLIN: HCPCS | Performed by: PSYCHIATRY & NEUROLOGY

## 2022-05-05 PROCEDURE — 4004F PT TOBACCO SCREEN RCVD TLK: CPT | Performed by: PSYCHIATRY & NEUROLOGY

## 2022-05-05 PROCEDURE — G8419 CALC BMI OUT NRM PARAM NOF/U: HCPCS | Performed by: PSYCHIATRY & NEUROLOGY

## 2022-05-05 PROCEDURE — 99215 OFFICE O/P EST HI 40 MIN: CPT | Performed by: PSYCHIATRY & NEUROLOGY

## 2022-05-05 ASSESSMENT — ENCOUNTER SYMPTOMS
VOICE CHANGE: 0
NAUSEA: 0
SHORTNESS OF BREATH: 0
PHOTOPHOBIA: 0
COUGH: 0
VOMITING: 0
COLOR CHANGE: 0

## 2022-05-05 NOTE — PROGRESS NOTES
Endovascular Neurosurgery - [address], [address] [facility], [address] [place] [address]  P: [phone]  F: [phone]      Dear Dr. Akil Ornelas    HPI:     EVERTON Wilson is a 55 y.o. female who presents with history of Maxi 15, 2019 Left v4 dissection without stenosis and left cerebellar stroke. 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 She has not had seizures which was last evaluated by Dr. Akil Ornelas. She followed up with me for further recommendations on the dissection    Patient did have a question on the BMV form which I will help followup with Dr. Akil Ornelas regarding seizure precautions. Allergies   Allergen Reactions    Bee Venom Hives    Other Rash     Bandaids  Artificial Sweetners    Tape [Adhesive Tape] Rash     Current Outpatient Medications   Medication Sig Dispense Refill    pyridoxine (RA VITAMIN B-6) 50 MG tablet Take 1 tablet by mouth daily 30 tablet 3    ketorolac (TORADOL) 10 MG tablet Take 1 tablet by mouth every 6 hours as needed for Pain 20 tablet 0    insulin lispro (HUMALOG KWIKPEN) 100 UNIT/ML pen <150 mg /dl 0 Units  151-200 2 Units  201-250 4 Units  251-300 6 Units  301-350 8 Units  351-400 10 Units  >400 12 Units 3 pen 1    insulin glargine (LANTUS) 100 UNIT/ML injection vial Inject 30 Units into the skin daily (Patient taking differently: Inject 40 Units into the skin daily ) 1 vial 3    aspirin (ECPIRIN) 325 MG EC tablet Take 1 tablet by mouth daily 30 tablet 3    ALPRAZolam (XANAX) 0.25 MG tablet Take 0.25 mg by mouth nightly as needed for Sleep. Rachana Mayers sertraline (ZOLOFT) 100 MG tablet Take 200 mg by mouth nightly       albuterol sulfate  (90 BASE) MCG/ACT inhaler Inhale 2 puffs into the lungs every 6 hours as needed for Wheezing      ibuprofen (ADVIL;MOTRIN) 800 MG tablet Take 800 mg by mouth every 6 hours as needed for Pain.  diphenhydrAMINE (BENADRYL) 50 MG capsule Take 50 mg by mouth nightly as needed.  levETIRAcetam (KEPPRA) 750 MG tablet Take 1 tablet by mouth 2 times daily 60 tablet 5    ondansetron (ZOFRAN) 4 MG tablet Take 1 tablet by mouth every 4 hours as needed for Nausea or Vomiting 15 tablet 0    oxyCODONE-acetaminophen (PERCOCET) 5-325 MG per tablet Take 1 tablet by mouth once. Takes only as needed (Patient not taking: Reported on 5/5/2022)       No current facility-administered medications for this visit. Past Medical History:   Diagnosis Date    Bronchitis     with RAD (seen in [facility] 3 days ago)    Cancer [facility])     cervical cancer at age 22    Cerebral artery occlusion with cerebral infarction [facility])     January 2019    Diabetes mellitus ([facility]     Headache(784.0)     Migraines    Seizures ([facility] [facility]     Type II or unspecified type diabetes mellitus without mention of complication, not stated as uncontrolled       Past Surgical History:   Procedure Laterality Date    DENTAL SURGERY      x4    HYSTERECTOMY      KNEE CARTILAGE SURGERY Left     OVARY REMOVAL Bilateral     TUBAL LIGATION       Family History   Problem Relation Age of Onset    Cancer Father     Stroke Maternal Grandmother     Cancer Paternal Grandfather      Social History     Tobacco Use    Smoking status: Current Every Day Smoker     Packs/day: 0.25     Years: 25.00     Pack years: 6.25     Types: Cigarettes    Smokeless tobacco: Never Used    Tobacco comment: 5 packs a month   Substance Use Topics    Alcohol use: No     Comment: Occ        Subjective:     Review of Systems   Constitutional: Negative for fever and unexpected weight change. HENT: Negative for voice change. Eyes: Negative for photophobia and visual disturbance. Respiratory: Negative for cough and shortness of breath. Cardiovascular: Negative for chest pain and palpitations. Gastrointestinal: Negative for nausea and vomiting. Musculoskeletal: Negative for neck stiffness. Skin: Negative for color change and pallor. Neurological: Negative for weakness and headaches. Psychiatric/Behavioral: Negative for confusion and decreased concentration. Objective:     Ht 6' 2\" (1.88 m)   Wt 210 lb (95.3 kg)   BMI 26.96 kg/m²   Physical Exam  Gen: SItting in chair, nad  CV:RRR  NEURO: alert and oriented x 3 intact language attention and knowledge  CN: eomi, perrl v1-v3 intact no facial asymmetry, midline tongue  Motor 5/5 bue/ble  COORD: no dysmetria  Sensory: intact lt      Assessment:        Trupti Jones is a 55 y.o. female who has history of left vert dissection with small left cerebellar stroke without stroke deficits and seizures on anticonvulsants. Diagnosis Orders   1. Vertebral artery dissection [facility])  MRA NECK WO CONTRAST    left vert followup       Recommendations:      Return in 1 year (on 5/3/2023) for mra neck without jeannie prior to visit. Orders Placed This Encounter   Procedures    MRA NECK WO CONTRAST     Standing Status:   Future     Standing Expiration Date:   2023     Scheduling Instructions:      May 2023 followup imaging     Order Specific Question:   Reason for exam:     Answer:   followup left vert dissection v4 segment     Order Specific Question:   What is the sedation requirement? Answer:   None       1. followup in one year mra neck followup dissection and possible vert pseudoaneurysm  2. Cont aspirin 81 mg daily  3. May resume normal activities. Based on cta no evidence of dissection of dominant can remove weight restrictions  4. Ongoing followup with Neurology for seizure management    Established Patient Visit Time: 46 minutes  Time Spent in Counselininutes  Greater than 50% of the time in this visit was spent counseling and coordinating care of this patient. Discussed use, benefit, and side effects of prescribed medications. Personally reviewed imaging with patients and all questions answered. Pt voiced understanding. Patient agreed with treatment plan. Follow up as directed above. If you have any questions, please do not hesitate to call me.   I look forward to following Ankita Hung      Sincerely,        Rosita Bingham MD, MD  Electronically signed by Rosita Bingham MD, MD on 5/5/2022 at 3:11 PM

## 2022-07-20 RX ORDER — LEVETIRACETAM 750 MG/1
TABLET ORAL
Qty: 60 TABLET | Refills: 10 | Status: SHIPPED | OUTPATIENT
Start: 2022-07-20

## 2022-09-08 ENCOUNTER — TELEMEDICINE (OUTPATIENT)
Dept: NEUROLOGY | Age: 47
End: 2022-09-08
Payer: COMMERCIAL

## 2022-09-08 DIAGNOSIS — R51.9 NONINTRACTABLE HEADACHE, UNSPECIFIED CHRONICITY PATTERN, UNSPECIFIED HEADACHE TYPE: Primary | ICD-10-CM

## 2022-09-08 PROCEDURE — G8427 DOCREV CUR MEDS BY ELIG CLIN: HCPCS | Performed by: STUDENT IN AN ORGANIZED HEALTH CARE EDUCATION/TRAINING PROGRAM

## 2022-09-08 PROCEDURE — 4004F PT TOBACCO SCREEN RCVD TLK: CPT | Performed by: STUDENT IN AN ORGANIZED HEALTH CARE EDUCATION/TRAINING PROGRAM

## 2022-09-08 PROCEDURE — G8419 CALC BMI OUT NRM PARAM NOF/U: HCPCS | Performed by: STUDENT IN AN ORGANIZED HEALTH CARE EDUCATION/TRAINING PROGRAM

## 2022-09-08 PROCEDURE — 99214 OFFICE O/P EST MOD 30 MIN: CPT | Performed by: STUDENT IN AN ORGANIZED HEALTH CARE EDUCATION/TRAINING PROGRAM

## 2022-09-08 ASSESSMENT — ENCOUNTER SYMPTOMS
NAUSEA: 0
EYE DISCHARGE: 0
WHEEZING: 0
ABDOMINAL PAIN: 0
SORE THROAT: 0
CHEST TIGHTNESS: 0
SHORTNESS OF BREATH: 0
VOMITING: 0
COUGH: 0
EYE REDNESS: 0
CONSTIPATION: 0
ABDOMINAL DISTENTION: 0
STRIDOR: 0
DIARRHEA: 0
APNEA: 0

## 2022-09-08 NOTE — PROGRESS NOTES
seizures or seizure-like activity or episodes of confusion or staring spells. She had only 1 seizure activity in April 2020 when she ran out of [employer] for few days and was not able to refill her medications. As per patient she is compliant with her seizure medications since then. Currently taking Keppra 750 mg twice a day  Denies any other new neurologic concerns during this visit. Denies any other changes in her medications. Denies any new allergies. She was requesting to fill her driving license form        Interval History 4/28/20  Saqib Vargas is a 55 y.o.  female  was seen on [facility] for follow up  Patient was last seen in the clinic on 2/8/19  Since last visit denies any recurrence of seizures or seizure-like activity, she is compliant with her medications, taking Keppra 750 mg twice a day, she cannot swallow big pills, taking 250 mg tablets 3 tablets twice a day. Denies any side effects on the medication  She is also taking aspirin 325 mg and Lipitor, she was supposed to follow-up with neuro endovascular Dr. Ana Trujillo, never followed up since then. Denies any other new neurologic concerns during this visit. Denies any headaches or vision changes or speech difficulty or focal tingling numbness or muscle weakness. 3/3/2022:   No seizures in interim. Last sz April 2020 when she ran out of meds. No dizziness or any other complaint at this time. CTA neck negative for vascular injury. MRI pending.            PREVIOUS WORKUP:     Lab Results   Component Value Date    WBC 9.5 06/05/2020    HGB 14.0 06/05/2020    HCT 44.9 06/05/2020    MCV 87.0 06/05/2020     06/05/2020       Past Medical History:   Diagnosis Date    Bronchitis     with RAD (seen in [facility] 3 days ago)    Cancer [facility])     cervical cancer at age 22    Cerebral artery occlusion with cerebral infarction [facility])     January 2019    Diabetes mellitus ([address]     Headache(784.0)     Migraines    Seizures ([address]     Type II or unspecified type diabetes mellitus without mention of complication, not stated as uncontrolled         Past Surgical History:   Procedure Laterality Date    DENTAL SURGERY      x4    HYSTERECTOMY      KNEE CARTILAGE SURGERY Left     OVARY REMOVAL Bilateral     TUBAL LIGATION          Social History     Socioeconomic History    Marital status:      Spouse name: Not on file    Number of children: Not on file    Years of education: Not on file    Highest education level: Not on file   Occupational History    Not on file   Tobacco Use    Smoking status: Every Day     Packs/day: 0.25     Years: 25.00     Pack years: 6.25     Types: Cigarettes    Smokeless tobacco: Never    Tobacco comments:     5 packs a month   Vaping Use    Vaping Use: Former    Substances: Unknown   Substance and Sexual Activity    Alcohol use: No     Comment:  Occ    Drug use: No    Sexual activity: Yes   Other Topics Concern    Not on file   Social History Narrative    Not on file     Social Determinants of Health     Financial Resource Strain: Not on file   Food Insecurity: Not on file   Transportation Needs: Not on file   Physical Activity: Not on file   Stress: Not on file   Social Connections: Not on file   Intimate Partner Violence: Not on file   Housing Stability: Not on file        Current Outpatient Medications   Medication Sig Dispense Refill    levETIRAcetam (KEPPRA) 750 MG tablet TAKE ONE (1) TABLET BY MOUTH TWICE DAILY 60 tablet 10    pyridoxine (RA VITAMIN B-6) 50 MG tablet Take 1 tablet by mouth daily 30 tablet 3    ketorolac (TORADOL) 10 MG tablet Take 1 tablet by mouth every 6 hours as needed for Pain 20 tablet 0    insulin lispro (HUMALOG KWIKPEN) 100 UNIT/ML pen <150 mg /dl 0 Units  151-200 2 Units  201-250 4 Units  251-300 6 Units  301-350 8 Units  351-400 10 Units  >400 12 Units 3 pen 1    insulin glargine (LANTUS) 100 UNIT/ML injection vial Inject 30 Units into the skin daily (Patient taking differently: Inject 40 Units into the skin daily ) 1 vial 3    ALPRAZolam (XANAX) 0.25 MG tablet Take 0.25 mg by mouth nightly as needed for Sleep. Jennifer Cardona sertraline (ZOLOFT) 100 MG tablet Take 200 mg by mouth nightly       ondansetron (ZOFRAN) 4 MG tablet Take 1 tablet by mouth every 4 hours as needed for Nausea or Vomiting 15 tablet 0    albuterol sulfate  (90 BASE) MCG/ACT inhaler Inhale 2 puffs into the lungs every 6 hours as needed for Wheezing      oxyCODONE-acetaminophen (PERCOCET) 5-325 MG per tablet Take 1 tablet by mouth once. Takes only as needed (Patient not taking: Reported on 5/5/2022)      ibuprofen (ADVIL;MOTRIN) 800 MG tablet Take 800 mg by mouth every 6 hours as needed for Pain. diphenhydrAMINE (BENADRYL) 50 MG capsule Take 50 mg by mouth nightly as needed. No current facility-administered medications for this visit. Allergies   Allergen Reactions    Bee Venom Hives    Other Rash     Bandaids  Artificial Sweetners    Tape Orren Salcedo Tape] Rash        REVIEW OF SYSTEMS:     Review of Systems   Constitutional: Negative for activity change, appetite change, chills, diaphoresis, fatigue and fever. HENT: Negative for sore throat. Eyes: Negative for discharge and redness. Respiratory: Negative for apnea, cough, chest tightness, shortness of breath, wheezing and stridor. Cardiovascular: Negative for chest pain and leg swelling. Gastrointestinal: Negative for abdominal distention, abdominal pain, constipation, diarrhea, nausea and vomiting. Genitourinary: Negative for difficulty urinating, dysuria, flank pain, frequency, hematuria and urgency. Musculoskeletal: Negative for arthralgias. Neurological: Negative for dizziness, tremors, seizures, syncope, facial asymmetry, speech difficulty, weakness, light-headedness, numbness and headaches. Hematological: Negative for adenopathy. VITALS  There were no vitals taken for this visit.      PHYSICAL EXAMINATION:       Constitutional: [x] Appears well-developed and well-nourished [x] No apparent distress      [] Abnormal-   Mental status  [x] Alert and awake  [x] Oriented to person/place/time [x]Able to follow commands      Eyes:  EOM    [x]  Normal  [] Abnormal-  Sclera  [x]  Normal  [] Abnormal -         Discharge [x]  None visible  [] Abnormal -    HENT:   [x] Normocephalic, atraumatic. [] Abnormal   [x] Mouth/Throat: Mucous membranes are moist.     External Ears [x] Normal  [] Abnormal-     Neck: [x] No visualized mass     Pulmonary/Chest: [x] Respiratory effort normal.  [x] No visualized signs of difficulty breathing or respiratory distress        [] Abnormal-      Musculoskeletal:   [x] Normal gait with no signs of ataxia         [x] Normal range of motion of neck        [] Abnormal-     Neurological:        [x] No Facial Asymmetry (Cranial nerve 7 motor function) (limited exam to video visit)          [x] No gaze palsy        [] Abnormal-         Skin:        [x] No significant exanthematous lesions or discoloration noted on facial skin         [] Abnormal-            Psychiatric:       [x] Normal Affect [] No Hallucinations        [] Abnormal-         IMAGING:       MRI brain with contrast: 1/16/19  Impression   Punctate acute infarct involving the left cerebellar hemisphere. No   significant mass effect. CTA head and neck 1/15/2019  Impression   Left vertebral artery, V4 segment, focal dissection without significant   stenosis. No acute intracranial arterial abnormality. 2-D Echo 1/16/2019  Summary  Left ventricle is normal in size. Global left ventricular systolic function  is normal. Estimated ejection fraction is 55 % . Trivial valve disease as below. ASSESSMENT:      Case of a 38 yo F with a PMH sig for cervical cancer, T1DM, headaches and is being seen by our clinic for seizures.  Prior EEG showing focal cerebral dysfunction in the right frontotemporal region with rare right frontotemporal sharp waves at T4 as well as right frontotemporal slowing on the right side. PLAN:     Antidepressants may lower seizure threshold as a whole, recommend to c/w  mg PO BID  Continue ASA to 81 mg qD. MRI Brain WO showing an infarct in the left cerebellum. No longer c/o feeling off balance. repeat CTA Neck W negative for dissection   Taking pyridoxine 50 mg qD, managed by her psychiatrist , recommended due to higher dose of Zoloft 200 mg. She reports taking 100 mg OTC due to availability. Repeat EEG, last EEG 9/29/2018 showing rare right frontotemporal sharp waves at T4 as well as right frontotemporal slowing on the right side  Seizure precautions discussed   RTC in 6 months, OK for VV    Ms. Lisa Zuñiga received counseling on the following healthy behaviors: medical compliance, smoking cessation, blood pressure control, regular follow up with primary doctor. Luis Fernando Guerra MD  PGY-3 Neurology  9/8/2022  11:03 PM         This is a telehealth visit that was performed with the originating site at Patient Location: Patient Home and Provider Location of [place], [place]. Patient ID verified by me prior to start of this visit. Verbal consent to participate in video visit was obtained. Pursuant to the emergency declaration under the [facility], 1135 waiver authority and the [employer] Act, this Virtual Visit was conducted, with patient's consent, to reduce the patient's risk of exposure to COVID-19 and provide continuity of care for an established/new patient. Services were provided through a video synchronous discussion virtually to substitute for in-person clinic visit.  I discussed with the patient the nature of our telehealth visits via interactive/real-time audio/video that:  - I would evaluate the patient and recommend diagnostics and treatments based on my assessment  - Our sessions are not being recorded and that personal health information is protected  - Our team would provide follow up care in person if/when the patient needs it.

## 2023-02-09 ENCOUNTER — TELEMEDICINE (OUTPATIENT)
Dept: NEUROLOGY | Age: 48
End: 2023-02-09
Payer: COMMERCIAL

## 2023-02-09 DIAGNOSIS — G40.909 SEIZURE DISORDER (HCC): Primary | ICD-10-CM

## 2023-02-09 DIAGNOSIS — I63.29 ACUTE ISCHEMIC VERTEBROBASILAR ARTERY CEREBELLAR STROKE (HCC): ICD-10-CM

## 2023-02-09 DIAGNOSIS — I63.9 CEREBROVASCULAR ACCIDENT (CVA), UNSPECIFIED MECHANISM (HCC): ICD-10-CM

## 2023-02-09 PROCEDURE — G8428 CUR MEDS NOT DOCUMENT: HCPCS | Performed by: STUDENT IN AN ORGANIZED HEALTH CARE EDUCATION/TRAINING PROGRAM

## 2023-02-09 PROCEDURE — 4004F PT TOBACCO SCREEN RCVD TLK: CPT | Performed by: STUDENT IN AN ORGANIZED HEALTH CARE EDUCATION/TRAINING PROGRAM

## 2023-02-09 PROCEDURE — 99214 OFFICE O/P EST MOD 30 MIN: CPT | Performed by: STUDENT IN AN ORGANIZED HEALTH CARE EDUCATION/TRAINING PROGRAM

## 2023-02-09 PROCEDURE — G8419 CALC BMI OUT NRM PARAM NOF/U: HCPCS | Performed by: STUDENT IN AN ORGANIZED HEALTH CARE EDUCATION/TRAINING PROGRAM

## 2023-02-09 PROCEDURE — G8484 FLU IMMUNIZE NO ADMIN: HCPCS | Performed by: STUDENT IN AN ORGANIZED HEALTH CARE EDUCATION/TRAINING PROGRAM

## 2023-02-09 RX ORDER — LEVETIRACETAM 750 MG/1
TABLET ORAL
Qty: 60 TABLET | Refills: 12 | Status: SHIPPED | OUTPATIENT
Start: 2023-02-09 | End: 2024-02-29

## 2023-02-09 NOTE — PROGRESS NOTES
[address] [place] [address] [address]  [facility] [address]  Dept: [phone]  Dept Fax: [phone]    NEUROLOGY CLINIC NOTE       PATIENT NAME: Janell Shrestha  PATIENT MRN: [id number]  PRIMARY CARE PHYSICIAN: No primary care provider on file.    Today's clinic visit updates:     Today was a virtual visit. Last office visit was 9/13/2022. The following is/are the patient's neurological conditions:    GTC seizures  She is on Keppra 750 mg bid. No seizures since last office visit. Her last seizure episode was in April 2020 when she ran out of keppra. She is compliant with the AED. No side effects reported. She stated she always has mood issues that fluctuate and is on zoloft but this mood fluctuation was before starting on keppra so she stated her mood is stable and she is not experiencing any mood issues after starting keppra. No suicide ideation. She did bring up the issue of driving and that she was told to renew her driving license every few months. I told the patient that from a seizure stand point, her last seizure event was more than 6 months ago and she is able to drive. We can send a letter to the patient stating this. And another discussion that took place was continuing AED vs tapering off. The patient stated her desire to keep the AED as it is.     Interval history:     Janell Shrestha is a 47 y.o. 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 She is on Keppra 750 mg twice daily with pyridoxine in addition to ASA 81 mg.  She had CTA head and neck in 2/3/2022 was negative. Patient seizures are well controlled since the time of diagnosis except for a seizure event that occurred in 2020 after running out of keppra. The patient had an EEG in 2/14/2022 and was normal. EEG in 9/29/2018 showed rare right frontotemporal  sharp wave discharges at T4 > F8 > T6 and rare right frontotemporal 2 HZ slowing     PREVIOUS WORKUP:     Lab Results   Component Value Date    WBC 9.5 06/05/2020    HGB 14.0 06/05/2020    HCT 44.9 06/05/2020    MCV 87.0 06/05/2020     06/05/2020       Past Medical History:   Diagnosis Date    Bronchitis     with RAD (seen in [facility] 3 days ago)    Cancer [facility])     cervical cancer at age 22    Cerebral artery occlusion with cerebral infarction [facility])     January 2019    Diabetes mellitus ([facility]     Headache(784.0)     Migraines    Seizures ([facility]     Type II or unspecified type diabetes mellitus without mention of complication, not stated as uncontrolled         Past Surgical History:   Procedure Laterality Date    DENTAL SURGERY      x4    HYSTERECTOMY (CERVIX STATUS UNKNOWN)      KNEE CARTILAGE SURGERY Left     OVARY REMOVAL Bilateral     TUBAL LIGATION          Social History     Socioeconomic History    Marital status:      Spouse name: Not on file    Number of children: Not on file    Years of education: Not on file    Highest education level: Not on file   Occupational History    Not on file   Tobacco Use    Smoking status: Every Day     Packs/day: 0.25     Years: 25.00     Pack years: 6.25     Types: Cigarettes    Smokeless tobacco: Never    Tobacco comments:     5 packs a month   Vaping Use    Vaping Use: Former    Substances: Unknown   Substance and Sexual Activity    Alcohol use: No     Comment:  Occ    Drug use: No    Sexual activity: Yes   Other Topics Concern    Not on file   Social History Narrative    Not on file     Social Determinants of Health     Financial Resource Strain: Not on file   Food Insecurity: Not on file   Transportation Needs: Not on file   Physical Activity: Not on file   Stress: Not on file   Social Connections: Not on file   Intimate Partner Violence: Not on file   Housing Stability: Not on file        Current Outpatient Medications   Medication Sig Dispense Refill levETIRAcetam (KEPPRA) 750 MG tablet TAKE ONE (1) TABLET BY MOUTH TWICE DAILY 60 tablet 10    pyridoxine (RA VITAMIN B-6) 50 MG tablet Take 1 tablet by mouth daily 30 tablet 3    ketorolac (TORADOL) 10 MG tablet Take 1 tablet by mouth every 6 hours as needed for Pain 20 tablet 0    insulin lispro (HUMALOG KWIKPEN) 100 UNIT/ML pen <150 mg /dl 0 Units  151-200 2 Units  201-250 4 Units  251-300 6 Units  301-350 8 Units  351-400 10 Units  >400 12 Units 3 pen 1    insulin glargine (LANTUS) 100 UNIT/ML injection vial Inject 30 Units into the skin daily (Patient taking differently: Inject 40 Units into the skin daily ) 1 vial 3    ALPRAZolam (XANAX) 0.25 MG tablet Take 0.25 mg by mouth nightly as needed for Sleep. Pinky Angles sertraline (ZOLOFT) 100 MG tablet Take 200 mg by mouth nightly       ondansetron (ZOFRAN) 4 MG tablet Take 1 tablet by mouth every 4 hours as needed for Nausea or Vomiting 15 tablet 0    albuterol sulfate  (90 BASE) MCG/ACT inhaler Inhale 2 puffs into the lungs every 6 hours as needed for Wheezing      oxyCODONE-acetaminophen (PERCOCET) 5-325 MG per tablet Take 1 tablet by mouth once. Takes only as needed (Patient not taking: Reported on 5/5/2022)      ibuprofen (ADVIL;MOTRIN) 800 MG tablet Take 800 mg by mouth every 6 hours as needed for Pain. diphenhydrAMINE (BENADRYL) 50 MG capsule Take 50 mg by mouth nightly as needed. No current facility-administered medications for this visit. Allergies   Allergen Reactions    Bee Venom Hives    Other Rash     Bandaids  Artificial Sweetners    Tape Karene Georgette Tape] Rash        REVIEW OF SYSTEMS:     Review of Systems     Review of Systems    Constitutional: Negative for appetite change, chills, fever and unexpected weight change. Eyes: Negative for photophobia and pain. Negative for visual disturbance. Respiratory: Negative for cough and shortness of breath. Cardiovascular: Negative  for chest pain. Negative for palpitations. Gastrointestinal: Negative for abdominal pain, constipation, diarrhea, nausea and vomiting. Endocrine: Negative for polydipsia and polyuria. Genitourinary: Negative for difficulty urinating, dysuria and hematuria. Musculoskeletal: Negative for back pain and neck pain. Skin: Negative for pallor and rash. Neurological: Positive for seizures. Psychiatric/Behavioral: Negative for behavioral problems and hallucinations. VITALS  There were no vitals taken for this visit. PHYSICAL EXAMINATION:     Physical Exam   General appearance: cooperative  Skin: no rash or skin lesions. HEENT: normocephalic  Optic Fundi: deferred  Neck: supple: no cervcical adenopathy or carotid bruit  Lungs: clear to auscultation  Heart: Regular rate and rhythm, normal S1, S2. No murmurs, clicks or gallops.   Peripheral pulses: radial pulses palpable  Abdominal: BS present, soft, NT, ND  Extremities: no edema    NEUROLOGICAL EXAMINATION:     GENERAL  Appears comfortable and in no distress   HEENT  NC/ AT   HEART  S1 and S2 heard; palpation of pulses: radial pulse    NECK  Supple and no bruits heard   MENTAL STATUS:  Alert, oriented, intact memory, no confusion, normal speech, normal language, no hallucination or delusion   CRANIAL NERVES: II     -      Visual fields intact to confrontation  III,IV,VI -  PERR, EOMs full, no ptosis  V     -     Normal facial sensation   VII    -     Normal facial symmetry  VIII   -     Intact hearing   IX,X -     Symmetrical palate  XI    -     Symmetrical shoulder shrug  XII   -     Midline tongue, no atrophy    MOTOR FUNCTION:   Upper Extremities:  RUE: Significant for good strength of grade 5/5 in proximal and distal muscle groups   LUE: Significant for good strength of grade 5/5 in proximal and distal muscle groups     Lower Extremities:  RLE: Significant for good strength of grade 5/5 in proximal and distal muscle groups   LLE: Significant for good strength of grade 5/5 in proximal and distal muscle groups      Normal bulk, normal tone and no involuntary movements, no tremor   SENSORY FUNCTION:  Normal touch, normal pin, normal vibration, normal proprioception   CEREBELLAR FUNCTION:  Intact fine motor control over upper limbs and lower limbs   REFLEX FUNCTION:  Symmetric in upper and lower extremities, no Babinski sign   STATION and GAIT  Normal gait and tandem station, normal tip toes and heel walking     IMAGING:     Imaging/Diagnostics:  No results found. ASSESSMENT:     Joselito Godfrey is a 52 y.o. female who follows with hour neurology clinic for:    GTC dated back to 2019, on keppra 750 mg bid orally with hx of left cerebellar stroke and hx of vertebral artery dissection. PLAN:     GTC   Continue on keppra 750 mg bid orally. I refilled the medication for 12 months as the patient wishes to continue on AEDs  Driving wise, the patient can drive as her last seizure was in 4/2020    Vertebral artery dissection grade 1   Continue on asa 81 mg   Repeat CTA H/N was negative. Clinically the patient has no neurolgoical deficits. RTC in 1 year       Ms. Shrestha received counseling on the following healthy behaviors: medical compliance, smoking cessation, blood pressure control, regular follow up with primary doctor.         Electronically signed by Iam Helm MD on 2/9/2023 at 9:16 AM

## 2023-02-09 NOTE — PATIENT INSTRUCTIONS
Schedule a Vaccine  When you qualify to receive the vaccine, call the [facility]) COVID-19 Vaccination Hotline to schedule your appointment or to get additional information about the [facility]) locations which are offering the COVID-19 vaccine. To be 94% effective, it's important that you receive two doses of one of the COVID-19 vaccines. -If you are receiving the Davalos Peter vaccine, your second shot will be scheduled as close to 21 days after the first shot as possible. -If you are receiving the Moderna vaccine, your second shot will be scheduled as close to 28 days after the first shot as possible. [facility]) COVID-19 Vaccination Hotline: [phone]    Links to [facility]) website and [facility] website:    [email or web address]    [email or web address]

## 2023-03-31 ENCOUNTER — TELEPHONE (OUTPATIENT)
Dept: NEUROSURGERY | Age: 48
End: 2023-03-31

## 2023-03-31 NOTE — TELEPHONE ENCOUNTER
Patient called to report that her license was suspended today despite being cleared to drive numerous times by physician. Patient requested for [facility] Physician Statement paperwork to be re faxed, writer re faxed the paperwork from media.

## 2024-01-31 RX ORDER — LEVETIRACETAM 750 MG/1
TABLET ORAL
Qty: 60 TABLET | Refills: 10 | Status: SHIPPED | OUTPATIENT
Start: 2024-01-31

## 2024-01-31 NOTE — TELEPHONE ENCOUNTER
E-scribe requesting refill for Levetiracetam. Please review and e-scribe if applicable.     Last Visit Date: 02/09/2023    Next Visit Date:  Future Appointments   Date Time Provider Department Center   2/14/2024  2:00 PM Nixon Philip MD Neuro [facility] Neurology -

## 2024-02-02 ENCOUNTER — HOSPITAL ENCOUNTER (EMERGENCY)
Age: 49
Discharge: HOME | End: 2024-02-02
Payer: COMMERCIAL

## 2024-02-02 ENCOUNTER — APPOINTMENT (OUTPATIENT)
Dept: CT IMAGING | Age: 49
End: 2024-02-02
Payer: COMMERCIAL

## 2024-02-02 VITALS
DIASTOLIC BLOOD PRESSURE: 66 MMHG | OXYGEN SATURATION: 93 % | RESPIRATION RATE: 16 BRPM | HEART RATE: 78 BPM | TEMPERATURE: 99.2 F | SYSTOLIC BLOOD PRESSURE: 125 MMHG

## 2024-02-02 DIAGNOSIS — N39.0 UTI (URINARY TRACT INFECTION), BACTERIAL: Primary | ICD-10-CM

## 2024-02-02 DIAGNOSIS — R31.0 GROSS HEMATURIA: ICD-10-CM

## 2024-02-02 DIAGNOSIS — A49.9 UTI (URINARY TRACT INFECTION), BACTERIAL: Primary | ICD-10-CM

## 2024-02-02 DIAGNOSIS — R73.9 HYPERGLYCEMIA: ICD-10-CM

## 2024-02-02 DIAGNOSIS — R10.9 FLANK PAIN: ICD-10-CM

## 2024-02-02 LAB
ALBUMIN SERPL-MCNC: 4.3 G/DL (ref 3.5–5.2)
ALBUMIN/GLOB SERPL: 1.5 {RATIO} (ref 1–2.5)
ALP SERPL-CCNC: 112 U/L (ref 35–104)
ALT SERPL-CCNC: 11 U/L (ref 5–33)
ANION GAP SERPL CALCULATED.3IONS-SCNC: 10 MMOL/L (ref 9–17)
AST SERPL-CCNC: 14 U/L
BACTERIA URNS QL MICRO: ABNORMAL
BASOPHILS # BLD: 0.07 K/UL (ref 0–0.2)
BASOPHILS NFR BLD: 1 % (ref 0–2)
BILIRUB SERPL-MCNC: 0.3 MG/DL (ref 0.3–1.2)
BILIRUB UR QL STRIP: NEGATIVE
BUN SERPL-MCNC: 14 MG/DL (ref 6–20)
BUN/CREAT SERPL: 23 (ref 9–20)
CALCIUM SERPL-MCNC: 9.9 MG/DL (ref 8.6–10.4)
CHLORIDE SERPL-SCNC: 97 MMOL/L (ref 98–107)
CLARITY UR: CLEAR
CO2 SERPL-SCNC: 26 MMOL/L (ref 20–31)
COLOR UR: YELLOW
CREAT SERPL-MCNC: 0.6 MG/DL (ref 0.5–0.9)
EOSINOPHIL # BLD: 0.16 K/UL (ref 0–0.44)
EOSINOPHILS RELATIVE PERCENT: 1 % (ref 1–4)
EPI CELLS #/AREA URNS HPF: ABNORMAL /HPF (ref 0–25)
ERYTHROCYTE [DISTWIDTH] IN BLOOD BY AUTOMATED COUNT: 12.8 % (ref 11.8–14.4)
GFR SERPL CREATININE-BSD FRML MDRD: >60 ML/MIN/1.73M2
GLUCOSE SERPL-MCNC: 346 MG/DL (ref 70–99)
GLUCOSE UR STRIP-MCNC: ABNORMAL MG/DL
HCT VFR BLD AUTO: 41.9 % (ref 36.3–47.1)
HGB BLD-MCNC: 14 G/DL (ref 11.9–15.1)
HGB UR QL STRIP.AUTO: ABNORMAL
IMM GRANULOCYTES # BLD AUTO: 0.04 K/UL (ref 0–0.3)
IMM GRANULOCYTES NFR BLD: 0 %
KETONES UR STRIP-MCNC: NEGATIVE MG/DL
LEUKOCYTE ESTERASE UR QL STRIP: ABNORMAL
LYMPHOCYTES NFR BLD: 1.16 K/UL (ref 1.1–3.7)
LYMPHOCYTES RELATIVE PERCENT: 9 % (ref 24–43)
MAGNESIUM SERPL-MCNC: 1.8 MG/DL (ref 1.6–2.6)
MCH RBC QN AUTO: 27.2 PG (ref 25.2–33.5)
MCHC RBC AUTO-ENTMCNC: 33.4 G/DL (ref 28.4–34.8)
MCV RBC AUTO: 81.4 FL (ref 82.6–102.9)
MONOCYTES NFR BLD: 0.58 K/UL (ref 0.1–1.2)
MONOCYTES NFR BLD: 5 % (ref 3–12)
NEUTROPHILS NFR BLD: 84 % (ref 36–65)
NEUTS SEG NFR BLD: 10.62 K/UL (ref 1.5–8.1)
NITRITE UR QL STRIP: NEGATIVE
NRBC BLD-RTO: 0 PER 100 WBC
PH UR STRIP: 6 [PH] (ref 5–9)
PLATELET # BLD AUTO: 312 K/UL (ref 138–453)
PMV BLD AUTO: 9.8 FL (ref 8.1–13.5)
POTASSIUM SERPL-SCNC: 4.4 MMOL/L (ref 3.7–5.3)
PROT SERPL-MCNC: 7.1 G/DL (ref 6.4–8.3)
PROT UR STRIP-MCNC: NEGATIVE MG/DL
RBC # BLD AUTO: 5.15 M/UL (ref 3.95–5.11)
RBC #/AREA URNS HPF: ABNORMAL /HPF (ref 0–2)
SODIUM SERPL-SCNC: 133 MMOL/L (ref 135–144)
SP GR UR STRIP: 1.02 (ref 1.01–1.02)
UROBILINOGEN UR STRIP-ACNC: NORMAL EU/DL (ref 0–1)
WBC #/AREA URNS HPF: ABNORMAL /HPF (ref 0–5)
WBC OTHER # BLD: 12.6 K/UL (ref 3.5–11.3)
YEAST URNS QL MICRO: ABNORMAL

## 2024-02-02 PROCEDURE — 96365 THER/PROPH/DIAG IV INF INIT: CPT

## 2024-02-02 PROCEDURE — 85025 COMPLETE CBC W/AUTO DIFF WBC: CPT

## 2024-02-02 PROCEDURE — 81001 URINALYSIS AUTO W/SCOPE: CPT

## 2024-02-02 PROCEDURE — 2580000003 HC RX 258: Performed by: NURSE PRACTITIONER

## 2024-02-02 PROCEDURE — 99284 EMERGENCY DEPT VISIT MOD MDM: CPT

## 2024-02-02 PROCEDURE — 83735 ASSAY OF MAGNESIUM: CPT

## 2024-02-02 PROCEDURE — 80053 COMPREHEN METABOLIC PANEL: CPT

## 2024-02-02 PROCEDURE — 87086 URINE CULTURE/COLONY COUNT: CPT

## 2024-02-02 PROCEDURE — 96361 HYDRATE IV INFUSION ADD-ON: CPT

## 2024-02-02 PROCEDURE — 6360000002 HC RX W HCPCS: Performed by: NURSE PRACTITIONER

## 2024-02-02 PROCEDURE — 74176 CT ABD & PELVIS W/O CONTRAST: CPT

## 2024-02-02 RX ORDER — CEPHALEXIN 500 MG/1
500 CAPSULE ORAL 3 TIMES DAILY
Qty: 30 CAPSULE | Refills: 0 | Status: SHIPPED | OUTPATIENT
Start: 2024-02-02 | End: 2024-02-12

## 2024-02-02 RX ORDER — FLUCONAZOLE 100 MG/1
100 TABLET ORAL DAILY
Qty: 14 TABLET | Refills: 0 | Status: SHIPPED | OUTPATIENT
Start: 2024-02-02 | End: 2024-02-16

## 2024-02-02 RX ORDER — 0.9 % SODIUM CHLORIDE 0.9 %
1000 INTRAVENOUS SOLUTION INTRAVENOUS ONCE
Status: COMPLETED | OUTPATIENT
Start: 2024-02-02 | End: 2024-02-02

## 2024-02-02 RX ADMIN — SODIUM CHLORIDE 1000 ML: 9 INJECTION, SOLUTION INTRAVENOUS at 16:43

## 2024-02-02 RX ADMIN — CEFTRIAXONE SODIUM 1000 MG: 1 INJECTION, POWDER, FOR SOLUTION INTRAMUSCULAR; INTRAVENOUS at 19:51

## 2024-02-02 ASSESSMENT — PAIN - FUNCTIONAL ASSESSMENT: PAIN_FUNCTIONAL_ASSESSMENT: 0-10

## 2024-02-02 NOTE — ED PROVIDER NOTES
[facility] ED  EMERGENCY DEPARTMENT ENCOUNTER      Pt Name: Janell Shrestha  MRN: [id number]  Birthdate 1975  Date of evaluation: 2/2/2024  Provider: LESLIE Jaffe CNP  4:17 PM    CHIEF COMPLAINT       Chief Complaint   Patient presents with    Abdominal Pain     Ongoing for about a month. Patient states that the pain keeps progressing as she thinks she is passing kidney stones.   Pt was taking ciprofloxacin and her last dose was Wednesday.     Flank Pain    Hematuria         HISTORY OF PRESENT ILLNESS    Janell Shrestha is a 48 y.o. female who presents to the emergency department ***     Janell Shrestha 49 yo female presents from home to ED with c/o abdominal pain.    The history is provided by the patient. No  was used.       Nursing Notes were reviewed.    REVIEW OF SYSTEMS       Review of Systems    Except as noted above the remainder of the review of systems was reviewed and negative.       PAST MEDICAL HISTORY     Past Medical History:   Diagnosis Date    Bronchitis     with RAD (seen in [place] ER 3 days ago)    Cancer (HCC)     cervical cancer at age 25    Cerebral artery occlusion with cerebral infarction (HCC)     January 2019    Diabetes mellitus (HCC)     Headache(784.0)     Migraines    Seizures ([facility])     Type II or unspecified type diabetes mellitus without mention of complication, not stated as uncontrolled          SURGICAL HISTORY       Past Surgical History:   Procedure Laterality Date    DENTAL SURGERY      x4    HYSTERECTOMY (CERVIX STATUS UNKNOWN)      KNEE CARTILAGE SURGERY Left     OVARY REMOVAL Bilateral     TUBAL LIGATION           CURRENT MEDICATIONS       Previous Medications    ALBUTEROL SULFATE  (90 BASE) MCG/ACT INHALER    Inhale 2 puffs into the lungs every 6 hours as needed for Wheezing    ALPRAZOLAM (XANAX) 0.25 MG TABLET    Take 0.25 mg by mouth nightly as needed for Sleep..    DIPHENHYDRAMINE (BENADRYL) 50 MG CAPSULE    Take 50 mg  02/02/2024 07:00:03 PM      PATIENT REFERRED TO:  No follow-up provider specified.    DISCHARGE MEDICATIONS:  Discharge Medication List as of 2/2/2024  7:44 PM        START taking these medications    Details   cephALEXin (KEFLEX) 500 MG capsule Take 1 capsule by mouth 3 times daily for 10 days, Disp-30 capsule, R-0Normal      fluconazole (DIFLUCAN) 100 MG tablet Take 1 tablet by mouth daily for 14 days, Disp-14 tablet, R-0Normal           Controlled Substances Monitoring:          No data to display                (Please note that portions of this note were completed with a voice recognition program.  Efforts were made to edit the dictations but occasionally words are mis-transcribed.)    LESLIE Jaffe - CNP (electronically signed)  Attending Emergency Physician           DAMEON Frsaer, LESLIE - CNP  02/06/24 [id number]

## 2024-02-03 LAB
MICROORGANISM SPEC CULT: NORMAL
SPECIMEN DESCRIPTION: NORMAL

## 2024-02-03 NOTE — DISCHARGE INSTRUCTIONS
Increase fluids  Cephalexin 500 mg 1 by mouth every 8 hours x 10 days  Will call with urine culture results on Sunday  Diflucan 100 mg 1 by mouth daily x 14 days.    Continue home medications.

## 2024-02-06 ASSESSMENT — ENCOUNTER SYMPTOMS: ABDOMINAL PAIN: 1

## 2024-02-14 ENCOUNTER — TELEMEDICINE (OUTPATIENT)
Dept: NEUROLOGY | Age: 49
End: 2024-02-14
Payer: COMMERCIAL

## 2024-02-14 DIAGNOSIS — I63.9 CEREBROVASCULAR ACCIDENT (CVA), UNSPECIFIED MECHANISM (HCC): ICD-10-CM

## 2024-02-14 DIAGNOSIS — G40.909 SEIZURE DISORDER (HCC): Primary | ICD-10-CM

## 2024-02-14 DIAGNOSIS — I63.29 ACUTE ISCHEMIC VERTEBROBASILAR ARTERY CEREBELLAR STROKE (HCC): ICD-10-CM

## 2024-02-14 PROCEDURE — 99214 OFFICE O/P EST MOD 30 MIN: CPT | Performed by: STUDENT IN AN ORGANIZED HEALTH CARE EDUCATION/TRAINING PROGRAM

## 2024-02-14 NOTE — PROGRESS NOTES
[address] [place] [address] [address]  [facility] [address]  Dept: [phone]  Dept Fax: [phone]    NEUROLOGY CLINIC NOTE       PATIENT NAME: Janell Shrestha  PATIENT MRN: [id number]  PRIMARY CARE PHYSICIAN: No primary care provider on file.    Today's clinic visit updates:     Today was a virtual visit. Last office visit was 9/13/2022. The following is/are the patient's neurological conditions:    GTC seizures  She is on Keppra 750 mg bid. No seizures since last office visit. Her last seizure episode was in April 2020 when she ran out of keppra. She is compliant with the AED. No side effects reported. She stated she always has mood issues that fluctuate and is on zoloft but this mood fluctuation was before starting on keppra so she stated her mood is stable and she is not experiencing any mood issues after starting keppra. No suicide ideation. She did bring up the issue of driving and that she was told to renew her driving license every few months. I told the patient that from a seizure stand point, her last seizure event was more than 6 months ago and she is able to drive. We can send a letter to the patient stating this. And another discussion that took place was continuing AED vs tapering off. The patient stated her desire to keep the AED as it is.     Interval history:     Janell Shrestha is a 48 y.o. 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 She is on Keppra 750 mg twice daily with pyridoxine in addition to ASA 81 mg.  She had CTA head and neck in 2/3/2022 was negative. Patient seizures are well controlled since the time of diagnosis except for a seizure event that occurred in 2020 after running out of keppra. The patient had an EEG in 2/14/2022 and was normal. EEG in 9/29/2018 showed rare right frontotemporal

## 2024-04-15 ENCOUNTER — TELEPHONE (OUTPATIENT)
Dept: NEUROLOGY | Age: 49
End: 2024-04-15

## 2024-04-15 NOTE — TELEPHONE ENCOUNTER
Patient has allergies. She wanted to know what should she take while taking Keppra. She confirmed that she had angry mood swings while taking the allergy medication and Keppra together.      Please Advise

## 2024-04-23 NOTE — TELEPHONE ENCOUNTER
She was taking Xyzal. She has the other medications that you recommended. She will start taking those and I will call her in few days to check for side effects.

## 2024-07-08 ENCOUNTER — HOSPITAL ENCOUNTER (OUTPATIENT)
Age: 49
Discharge: HOME | End: 2024-07-08
Payer: COMMERCIAL

## 2024-07-08 LAB
CHOLEST SERPL-MCNC: 164 MG/DL (ref 0–199)
CHOLESTEROL/HDL RATIO: 3
EST. AVERAGE GLUCOSE BLD GHB EST-MCNC: 266 MG/DL
HBA1C MFR BLD: 10.9 % (ref 4–6)
HDLC SERPL-MCNC: 55 MG/DL
LDLC SERPL CALC-MCNC: 94 MG/DL (ref 0–100)
TRIGL SERPL-MCNC: 75 MG/DL
TSH SERPL DL<=0.05 MIU/L-ACNC: 1.32 UIU/ML (ref 0.27–4.2)
VLDLC SERPL CALC-MCNC: 15 MG/DL

## 2024-07-08 PROCEDURE — 80061 LIPID PANEL: CPT

## 2024-07-08 PROCEDURE — 84443 ASSAY THYROID STIM HORMONE: CPT

## 2024-07-08 PROCEDURE — 83036 HEMOGLOBIN GLYCOSYLATED A1C: CPT

## 2024-07-08 PROCEDURE — 36415 COLL VENOUS BLD VENIPUNCTURE: CPT

## 2024-08-13 ENCOUNTER — OFFICE VISIT (OUTPATIENT)
Dept: NEUROLOGY | Age: 49
End: 2024-08-13
Payer: COMMERCIAL

## 2024-08-13 VITALS
DIASTOLIC BLOOD PRESSURE: 85 MMHG | WEIGHT: 220.2 LBS | HEART RATE: 80 BPM | BODY MASS INDEX: 29.82 KG/M2 | HEIGHT: 72 IN | SYSTOLIC BLOOD PRESSURE: 147 MMHG

## 2024-08-13 DIAGNOSIS — G40.909 SEIZURE DISORDER (HCC): Primary | ICD-10-CM

## 2024-08-13 PROCEDURE — G8419 CALC BMI OUT NRM PARAM NOF/U: HCPCS | Performed by: STUDENT IN AN ORGANIZED HEALTH CARE EDUCATION/TRAINING PROGRAM

## 2024-08-13 PROCEDURE — 4004F PT TOBACCO SCREEN RCVD TLK: CPT | Performed by: STUDENT IN AN ORGANIZED HEALTH CARE EDUCATION/TRAINING PROGRAM

## 2024-08-13 PROCEDURE — G8427 DOCREV CUR MEDS BY ELIG CLIN: HCPCS | Performed by: STUDENT IN AN ORGANIZED HEALTH CARE EDUCATION/TRAINING PROGRAM

## 2024-08-13 PROCEDURE — 99214 OFFICE O/P EST MOD 30 MIN: CPT | Performed by: STUDENT IN AN ORGANIZED HEALTH CARE EDUCATION/TRAINING PROGRAM

## 2024-10-10 ENCOUNTER — HOSPITAL ENCOUNTER (EMERGENCY)
Age: 49
Discharge: HOME | End: 2024-10-10
Payer: COMMERCIAL

## 2024-10-10 ENCOUNTER — APPOINTMENT (OUTPATIENT)
Dept: GENERAL RADIOLOGY | Age: 49
End: 2024-10-10
Payer: COMMERCIAL

## 2024-10-10 VITALS
RESPIRATION RATE: 16 BRPM | TEMPERATURE: 99.7 F | DIASTOLIC BLOOD PRESSURE: 76 MMHG | SYSTOLIC BLOOD PRESSURE: 135 MMHG | HEART RATE: 83 BPM | OXYGEN SATURATION: 98 %

## 2024-10-10 DIAGNOSIS — J06.9 ACUTE UPPER RESPIRATORY INFECTION: Primary | ICD-10-CM

## 2024-10-10 LAB
ANION GAP SERPL CALCULATED.3IONS-SCNC: 11 MMOL/L (ref 9–16)
BASOPHILS # BLD: 0.04 K/UL (ref 0–0.2)
BASOPHILS NFR BLD: 1 % (ref 0–2)
BUN SERPL-MCNC: 10 MG/DL (ref 6–20)
BUN/CREAT SERPL: 14 (ref 9–20)
CALCIUM SERPL-MCNC: 9.6 MG/DL (ref 8.6–10.4)
CHLORIDE SERPL-SCNC: 103 MMOL/L (ref 98–107)
CO2 SERPL-SCNC: 27 MMOL/L (ref 20–31)
CREAT SERPL-MCNC: 0.7 MG/DL (ref 0.5–0.9)
EOSINOPHIL # BLD: 0.11 K/UL (ref 0–0.44)
EOSINOPHILS RELATIVE PERCENT: 1 % (ref 1–4)
ERYTHROCYTE [DISTWIDTH] IN BLOOD BY AUTOMATED COUNT: 12.7 % (ref 11.8–14.4)
FLUAV AG SPEC QL: NEGATIVE
FLUBV AG SPEC QL: NEGATIVE
GFR, ESTIMATED: >90 ML/MIN/1.73M2
GLUCOSE SERPL-MCNC: 107 MG/DL (ref 74–99)
HCT VFR BLD AUTO: 42.3 % (ref 36.3–47.1)
HGB BLD-MCNC: 14 G/DL (ref 11.9–15.1)
IMM GRANULOCYTES # BLD AUTO: 0.03 K/UL (ref 0–0.3)
IMM GRANULOCYTES NFR BLD: 0 %
LYMPHOCYTES NFR BLD: 1.21 K/UL (ref 1.1–3.7)
LYMPHOCYTES RELATIVE PERCENT: 15 % (ref 24–43)
MCH RBC QN AUTO: 27.5 PG (ref 25.2–33.5)
MCHC RBC AUTO-ENTMCNC: 33.1 G/DL (ref 28.4–34.8)
MCV RBC AUTO: 82.9 FL (ref 82.6–102.9)
MONOCYTES NFR BLD: 0.61 K/UL (ref 0.1–1.2)
MONOCYTES NFR BLD: 7 % (ref 3–12)
NEUTROPHILS NFR BLD: 76 % (ref 36–65)
NEUTS SEG NFR BLD: 6.27 K/UL (ref 1.5–8.1)
NRBC BLD-RTO: 0 PER 100 WBC
PLATELET # BLD AUTO: 273 K/UL (ref 138–453)
PMV BLD AUTO: 10.5 FL (ref 8.1–13.5)
POTASSIUM SERPL-SCNC: 4 MMOL/L (ref 3.7–5.3)
RBC # BLD AUTO: 5.1 M/UL (ref 3.95–5.11)
SARS-COV-2 RDRP RESP QL NAA+PROBE: NOT DETECTED
SODIUM SERPL-SCNC: 141 MMOL/L (ref 136–145)
SPECIMEN DESCRIPTION: NORMAL
TROPONIN I SERPL HS-MCNC: 8 NG/L (ref 0–14)
WBC OTHER # BLD: 8.3 K/UL (ref 3.5–11.3)

## 2024-10-10 PROCEDURE — 94664 DEMO&/EVAL PT USE INHALER: CPT

## 2024-10-10 PROCEDURE — 85025 COMPLETE CBC W/AUTO DIFF WBC: CPT

## 2024-10-10 PROCEDURE — 84484 ASSAY OF TROPONIN QUANT: CPT

## 2024-10-10 PROCEDURE — 99285 EMERGENCY DEPT VISIT HI MDM: CPT

## 2024-10-10 PROCEDURE — 71045 X-RAY EXAM CHEST 1 VIEW: CPT

## 2024-10-10 PROCEDURE — 87635 SARS-COV-2 COVID-19 AMP PRB: CPT

## 2024-10-10 PROCEDURE — 80048 BASIC METABOLIC PNL TOTAL CA: CPT

## 2024-10-10 PROCEDURE — 6370000000 HC RX 637 (ALT 250 FOR IP)

## 2024-10-10 PROCEDURE — 87804 INFLUENZA ASSAY W/OPTIC: CPT

## 2024-10-10 RX ORDER — ALBUTEROL SULFATE 90 UG/1
2 INHALANT RESPIRATORY (INHALATION) 4 TIMES DAILY PRN
Qty: 18 G | Refills: 0 | Status: SHIPPED | OUTPATIENT
Start: 2024-10-10

## 2024-10-10 RX ORDER — IPRATROPIUM BROMIDE AND ALBUTEROL SULFATE 2.5; .5 MG/3ML; MG/3ML
1 SOLUTION RESPIRATORY (INHALATION) ONCE
Status: COMPLETED | OUTPATIENT
Start: 2024-10-10 | End: 2024-10-10

## 2024-10-10 RX ORDER — ALBUTEROL SULFATE 0.83 MG/ML
2.5 SOLUTION RESPIRATORY (INHALATION) EVERY 6 HOURS PRN
Qty: 120 EACH | Refills: 0 | Status: SHIPPED | OUTPATIENT
Start: 2024-10-10 | End: 2024-10-20

## 2024-10-10 RX ORDER — BENZONATATE 200 MG/1
200 CAPSULE ORAL 3 TIMES DAILY PRN
Qty: 21 CAPSULE | Refills: 0 | Status: SHIPPED | OUTPATIENT
Start: 2024-10-10 | End: 2024-10-17

## 2024-10-10 RX ORDER — ACETAMINOPHEN 325 MG/1
650 TABLET ORAL ONCE
Status: COMPLETED | OUTPATIENT
Start: 2024-10-10 | End: 2024-10-10

## 2024-10-10 RX ADMIN — IPRATROPIUM BROMIDE AND ALBUTEROL SULFATE 1 DOSE: .5; 3 SOLUTION RESPIRATORY (INHALATION) at 08:45

## 2024-10-10 RX ADMIN — ACETAMINOPHEN 650 MG: 325 TABLET ORAL at 08:05

## 2024-10-10 ASSESSMENT — PAIN DESCRIPTION - LOCATION: LOCATION: CHEST

## 2024-10-10 ASSESSMENT — PAIN DESCRIPTION - DESCRIPTORS: DESCRIPTORS: HEAVINESS

## 2024-10-10 ASSESSMENT — PAIN - FUNCTIONAL ASSESSMENT: PAIN_FUNCTIONAL_ASSESSMENT: 0-10

## 2024-10-10 ASSESSMENT — PAIN DESCRIPTION - PAIN TYPE: TYPE: ACUTE PAIN

## 2024-10-10 ASSESSMENT — PAIN DESCRIPTION - ORIENTATION: ORIENTATION: MID

## 2024-10-10 ASSESSMENT — PAIN SCALES - GENERAL: PAINLEVEL_OUTOF10: 7

## 2024-10-10 NOTE — ED PROVIDER NOTES
[facility] ED  EMERGENCY DEPARTMENT ENCOUNTER        Pt Name: Janell Shrestha  MRN: [id number]  Birthdate 1975  Date of evaluation: 10/10/2024  Provider: Makenna Chester MD  PCP: No primary care provider on file.  Note Started: 7:55 AM EDT 10/10/24    CHIEF COMPLAINT       Chief Complaint   Patient presents with    Cough     Complains of nasal drainage, cough and fever ongoing since Sunday.    Fever       HISTORY OF PRESENT ILLNESS: 1 or more Elements     Janell Shrestha is a 49 y.o. female past medical history of type 1 diabetes, seizures who presents cough, fever and chest pain.  Patient states symptoms have been ongoing since Sunday.  States that her grandkid was sick initially and got her sick with similar symptoms.  States that she is coughing up sputum green, brown and clear.  States that she feels like her cough is causing chest pain.  She endorses worsening chest pain today.  Feels like there is something sitting on her chest.  States that she sometimes also feels like she is drowning due to all the mucus.  She has been trying over-the-counter medications with minimal symptomatic relief.  States she had a fever last night.  Denies any headache, dizziness, vision changes, neck tenderness or stiffness, weakness, palpitations, leg swelling/tenderness,  abd pain, dysuria, hematuria, diarrhea, constipation, bloody stools.    Nursing Notes were all reviewed and agreed with or any disagreements were addressed in the HPI.    ROS:   Pertinent positives and negatives are stated within HPI, all other systems reviewed and are negative.      --------------------------------------------- PAST HISTORY ---------------------------------------------  Past Medical History:  has a past medical history of Bronchitis, Cancer (HCC), Cerebral artery occlusion with cerebral infarction (HCC), Diabetes mellitus (HCC), Headache(784.0), Migraine, Seizures (HCC), and Type II or unspecified type diabetes mellitus without

## 2024-10-10 NOTE — DISCHARGE INSTRUCTIONS
You may use your albuterol inhaler for shortness of breath, cough and wheezing.  Please continue taking your sinus medications and decongestants as needed.  Tessalon Perles were prescribed for cough.  Please follow-up with your primary care doctor outpatient for follow-up visit.  Return to the ED for any worsening symptoms.

## 2024-10-11 LAB
EKG ATRIAL RATE: 84 BPM
EKG P AXIS: 19 DEGREES
EKG P-R INTERVAL: 136 MS
EKG Q-T INTERVAL: 362 MS
EKG QRS DURATION: 84 MS
EKG QTC CALCULATION (BAZETT): 427 MS
EKG R AXIS: 80 DEGREES
EKG T AXIS: 43 DEGREES
EKG VENTRICULAR RATE: 84 BPM

## 2024-10-27 ENCOUNTER — APPOINTMENT (OUTPATIENT)
Dept: GENERAL RADIOLOGY | Age: 49
End: 2024-10-27
Payer: COMMERCIAL

## 2024-10-27 ENCOUNTER — APPOINTMENT (OUTPATIENT)
Dept: CT IMAGING | Age: 49
End: 2024-10-27
Payer: COMMERCIAL

## 2024-10-27 ENCOUNTER — HOSPITAL ENCOUNTER (EMERGENCY)
Age: 49
Discharge: HOME | End: 2024-10-27
Attending: EMERGENCY MEDICINE
Payer: COMMERCIAL

## 2024-10-27 VITALS
OXYGEN SATURATION: 95 % | SYSTOLIC BLOOD PRESSURE: 130 MMHG | TEMPERATURE: 97.7 F | DIASTOLIC BLOOD PRESSURE: 78 MMHG | HEART RATE: 72 BPM | RESPIRATION RATE: 18 BRPM

## 2024-10-27 DIAGNOSIS — M54.2 NECK PAIN: Primary | ICD-10-CM

## 2024-10-27 LAB
ANION GAP SERPL CALCULATED.3IONS-SCNC: 15 MMOL/L (ref 9–16)
BASOPHILS # BLD: 0.06 K/UL (ref 0–0.2)
BASOPHILS NFR BLD: 1 % (ref 0–2)
BUN SERPL-MCNC: 17 MG/DL (ref 6–20)
CALCIUM SERPL-MCNC: 9.1 MG/DL (ref 8.6–10.4)
CHLORIDE SERPL-SCNC: 102 MMOL/L (ref 98–107)
CO2 SERPL-SCNC: 23 MMOL/L (ref 20–31)
CREAT SERPL-MCNC: 0.7 MG/DL (ref 0.5–0.9)
EOSINOPHIL # BLD: 0.11 K/UL (ref 0–0.44)
EOSINOPHILS RELATIVE PERCENT: 1 % (ref 1–4)
ERYTHROCYTE [DISTWIDTH] IN BLOOD BY AUTOMATED COUNT: 13.2 % (ref 11.8–14.4)
GFR, ESTIMATED: >90 ML/MIN/1.73M2
GLUCOSE SERPL-MCNC: 281 MG/DL (ref 74–99)
HCT VFR BLD AUTO: 44.3 % (ref 36.3–47.1)
HGB BLD-MCNC: 14.4 G/DL (ref 11.9–15.1)
IMM GRANULOCYTES # BLD AUTO: <0.03 K/UL (ref 0–0.3)
IMM GRANULOCYTES NFR BLD: 0 %
LYMPHOCYTES NFR BLD: 2.15 K/UL (ref 1.1–3.7)
LYMPHOCYTES RELATIVE PERCENT: 26 % (ref 24–43)
MCH RBC QN AUTO: 27.1 PG (ref 25.2–33.5)
MCHC RBC AUTO-ENTMCNC: 32.5 G/DL (ref 28.4–34.8)
MCV RBC AUTO: 83.3 FL (ref 82.6–102.9)
MONOCYTES NFR BLD: 0.42 K/UL (ref 0.1–1.2)
MONOCYTES NFR BLD: 5 % (ref 3–12)
NEUTROPHILS NFR BLD: 67 % (ref 36–65)
NEUTS SEG NFR BLD: 5.62 K/UL (ref 1.5–8.1)
NRBC BLD-RTO: 0 PER 100 WBC
PLATELET # BLD AUTO: 306 K/UL (ref 138–453)
PMV BLD AUTO: 9.9 FL (ref 8.1–13.5)
POTASSIUM SERPL-SCNC: 4.4 MMOL/L (ref 3.7–5.3)
RBC # BLD AUTO: 5.32 M/UL (ref 3.95–5.11)
SODIUM SERPL-SCNC: 140 MMOL/L (ref 136–145)
TROPONIN I SERPL HS-MCNC: <6 NG/L (ref 0–14)
TROPONIN I SERPL HS-MCNC: <6 NG/L (ref 0–14)
WBC OTHER # BLD: 8.4 K/UL (ref 3.5–11.3)

## 2024-10-27 PROCEDURE — 6370000000 HC RX 637 (ALT 250 FOR IP)

## 2024-10-27 PROCEDURE — 99285 EMERGENCY DEPT VISIT HI MDM: CPT | Performed by: EMERGENCY MEDICINE

## 2024-10-27 PROCEDURE — 70498 CT ANGIOGRAPHY NECK: CPT

## 2024-10-27 PROCEDURE — 6360000004 HC RX CONTRAST MEDICATION

## 2024-10-27 PROCEDURE — 71045 X-RAY EXAM CHEST 1 VIEW: CPT

## 2024-10-27 PROCEDURE — 70450 CT HEAD/BRAIN W/O DYE: CPT

## 2024-10-27 PROCEDURE — 93005 ELECTROCARDIOGRAM TRACING: CPT

## 2024-10-27 PROCEDURE — 80048 BASIC METABOLIC PNL TOTAL CA: CPT

## 2024-10-27 PROCEDURE — 84484 ASSAY OF TROPONIN QUANT: CPT

## 2024-10-27 PROCEDURE — 85025 COMPLETE CBC W/AUTO DIFF WBC: CPT

## 2024-10-27 RX ORDER — LIDOCAINE 4 G/G
1 PATCH TOPICAL DAILY
Qty: 10 EACH | Refills: 0 | Status: SHIPPED | OUTPATIENT
Start: 2024-10-27 | End: 2024-11-06

## 2024-10-27 RX ORDER — IOPAMIDOL 755 MG/ML
90 INJECTION, SOLUTION INTRAVASCULAR
Status: COMPLETED | OUTPATIENT
Start: 2024-10-27 | End: 2024-10-27

## 2024-10-27 RX ORDER — LIDOCAINE 4 G/G
1 PATCH TOPICAL ONCE
Status: DISCONTINUED | OUTPATIENT
Start: 2024-10-27 | End: 2024-10-27 | Stop reason: HOSPADM

## 2024-10-27 RX ORDER — ACETAMINOPHEN 500 MG
1000 TABLET ORAL 4 TIMES DAILY PRN
Qty: 80 TABLET | Refills: 0 | Status: SHIPPED | OUTPATIENT
Start: 2024-10-27 | End: 2024-11-06

## 2024-10-27 RX ORDER — ACETAMINOPHEN 500 MG
1000 TABLET ORAL ONCE
Status: COMPLETED | OUTPATIENT
Start: 2024-10-27 | End: 2024-10-27

## 2024-10-27 RX ORDER — CYCLOBENZAPRINE HCL 5 MG
5 TABLET ORAL 2 TIMES DAILY PRN
Qty: 4 TABLET | Refills: 0 | Status: SHIPPED | OUTPATIENT
Start: 2024-10-27 | End: 2024-10-29

## 2024-10-27 RX ADMIN — ACETAMINOPHEN 1000 MG: 500 TABLET ORAL at 13:08

## 2024-10-27 RX ADMIN — IOPAMIDOL 90 ML: 755 INJECTION, SOLUTION INTRAVENOUS at 14:43

## 2024-10-27 ASSESSMENT — PAIN SCALES - GENERAL: PAINLEVEL_OUTOF10: 7

## 2024-10-27 ASSESSMENT — PAIN - FUNCTIONAL ASSESSMENT: PAIN_FUNCTIONAL_ASSESSMENT: 0-10

## 2024-10-27 NOTE — ED TRIAGE NOTES
Pt to ed c/o left shoulder and neck pain. Per pt this has been going on for about 2 weeks. Pt states the pain radiates donw her neck and arm. Pt states the pain worsened last night after having sex. Denies injury. Pt states last time she had these symptoms she had a stroke, so she is feeling anxious.     Pt is alert and oriented x4. Ambulatory to room. Vitals stable. Call light in reach. Will continue with plan of care.

## 2024-10-27 NOTE — DISCHARGE INSTRUCTIONS
Call today or tomorrow to follow up with your PCP within 2 days.  If you do not have a PCP I have read the number for our families in clinic, please call and establish care with a provider.    Use an ice pack or bag filled with ice and apply to the injured area 3 - 4 times a day for 15 - 20 minutes each time.  If the injury is older than 3 days, then use a heating pad to help relax the muscles in your neck.    Return to the Emergency Department for worsening pain, fever > 101.5, excessive nausea or vomiting, headache, if the light bothers you, inability to move arms or legs, decrease or loss of sensation to your arms or legs, difficulty with moving neck, any other care or concern.

## 2024-10-27 NOTE — ED PROVIDER NOTES
[facility]  Emergency Department  Faculty Attestation     I performed a history and physical examination of the patient and discussed management with the resident. I reviewed the resident’s note and agree with the documented findings and plan of care. Any areas of disagreement are noted on the chart. I was personally present for the key portions of any procedures. I have documented in the chart those procedures where I was not present during the key portions. I have reviewed the emergency nurses triage note. I agree with the chief complaint, past medical history, past surgical history, allergies, medications, social and family history as documented unless otherwise noted below.    For Physician Assistant/ Nurse Practitioner cases/documentation I have personally evaluated this patient and have completed at least one if not all key elements of the E/M (history, physical exam, and MDM). Additional findings are as noted.    Preliminary note started at 1:02 PM EDT    Primary Care Physician:  No primary care provider on file.    Screenings:  [unfilled]    CHIEF COMPLAINT       Chief Complaint   Patient presents with    Neck Pain    Shoulder Pain       RECENT VITALS:   BP (!) 151/90   Pulse 96   Temp 97.7 °F (36.5 °C)   Resp 18   SpO2 98%     LABS:  Labs Reviewed   CBC WITH AUTO DIFFERENTIAL - Abnormal; Notable for the following components:       Result Value    RBC 5.32 (*)     Neutrophils % 67 (*)     All other components within normal limits   BASIC METABOLIC PANEL   TROPONIN   TROPONIN       Radiology  CT HEAD WO CONTRAST    (Results Pending)   CTA HEAD NECK W CONTRAST    (Results Pending)   XR CHEST PORTABLE    (Results Pending)       CRITICAL CARE: There was a high probability of clinically significant/life threatening deterioration in this patient's condition which required my urgent intervention.  Total critical care time was none minutes.  This excludes any time for 
Faculty Sign-Out Attestation  Handoff taken on the following patient from prior Attending Physician: Prasad    Note Started: 4:18 PM EDT    I was available and discussed any additional care issues that arose and coordinated the management plans with the resident(s) caring for the patient during my duty period. Any areas of disagreement with resident’s documentation of care or procedures are noted on the chart. I was personally present for the key portions of any/all procedures during my duty period. I have documented in the chart those procedures where I was not present during the key portions.    CT HEAD WO CONTRAST   Final Result   No acute intracranial finding.         CTA HEAD NECK W CONTRAST   Final Result   Unremarkable CTA of the head and neck.         XR CHEST PORTABLE   Final Result   Chronic pattern of mild interstitial change with no acute finding.               Edilberto Campos MD  Attending Physician        Edilberto Campos MD  10/27/24 2173    
capsule by mouth nightly as needed    Provider, MD Juan       REVIEW OF SYSTEMS       Review of Systems   Constitutional:  Negative for chills and fever.   Respiratory:  Negative for cough and shortness of breath.    Cardiovascular:  Negative for chest pain and leg swelling.   Gastrointestinal:  Negative for abdominal pain, diarrhea, nausea and vomiting.   Genitourinary:  Negative for dysuria and frequency.   Musculoskeletal:  Negative for back pain.   Skin:  Negative for rash.   Neurological:  Negative for dizziness, syncope and numbness.       PHYSICAL EXAM      INITIAL VITALS:   /78   Pulse 72   Temp 97.7 °F (36.5 °C)   Resp 18   SpO2 95%     Physical Exam  Vitals and nursing note reviewed.   Constitutional:       General: She is not in acute distress.     Appearance: She is well-developed. She is not diaphoretic.   Neck:      Comments: Good range of motion of the neck, no tenderness to midline C-spine.    Tenderness along the left cervical paraspinal region.  Cardiovascular:      Rate and Rhythm: Normal rate and regular rhythm.      Heart sounds: Normal heart sounds. No murmur heard.     No friction rub. No gallop.   Pulmonary:      Effort: Pulmonary effort is normal. No respiratory distress.      Breath sounds: Normal breath sounds. No wheezing.   Abdominal:      General: Bowel sounds are normal. There is no distension.      Palpations: Abdomen is soft.      Tenderness: There is no abdominal tenderness.   Musculoskeletal:         General: No tenderness. Normal range of motion.      Comments: Good range of motion of shoulder joint including passive motion.  Good range of motion of thumb,  Tenderness on palpation over the humeral head however excellent range of motion of the shoulder,   Skin:     General: Skin is warm and dry.      Findings: No rash.   Neurological:      Mental Status: She is alert and oriented to person, place, and time.           DDX/DIAGNOSTIC RESULTS / EMERGENCY DEPARTMENT

## 2024-10-27 NOTE — ED NOTES
Pt requested lidocaine patch to be removed due to it not helping her pain.   Patch removed and pt ambulatory to restroom with steady gait.

## 2024-10-28 LAB
EKG ATRIAL RATE: 81 BPM
EKG P AXIS: 68 DEGREES
EKG P-R INTERVAL: 144 MS
EKG Q-T INTERVAL: 390 MS
EKG QRS DURATION: 80 MS
EKG QTC CALCULATION (BAZETT): 453 MS
EKG R AXIS: 79 DEGREES
EKG T AXIS: 54 DEGREES
EKG VENTRICULAR RATE: 81 BPM

## 2024-10-28 PROCEDURE — 93010 ELECTROCARDIOGRAM REPORT: CPT | Performed by: INTERNAL MEDICINE

## 2024-10-28 ASSESSMENT — ENCOUNTER SYMPTOMS
BACK PAIN: 0
SHORTNESS OF BREATH: 0
ABDOMINAL PAIN: 0
COUGH: 0
DIARRHEA: 0
NAUSEA: 0
VOMITING: 0

## 2024-11-05 NOTE — PROGRESS NOTES
0750am--Dr. Claudy Morejon on unit to round on patient. Orders obtained to consult Diabetes Educator and resident instructed writer to turn off insulin pump, administer 10 units lantus as well as start medium dose corrective scale. Writer states that we will consult Diabetes Educator first to evaluate patient and will notify with updates and/or recommendations. Verbalized understanding. 0805am--RN contacts Diabetes Educator to come evaluate patient and assess any education deficits or recommendations for sugar coverage while patient is NPO.  0830am--Diabetes Educator on unit rounding to speak to patient. 0840am--RN contacts Marielos Alejandro from CT to estimate time frame for patient to complete CT scans. States to check back in approximately 4 hours due to heavy influx of scans needed done today. Notified Neurosurgery. 0930am--Alize Diabetes Educator, informs writer of recommendations that will be discussed with Internal Medicine team. Patient states she checked her glucose levels at 0803am, covered SB=258 with correction of 1.8 units, and 0930am covering FX=929 with correction of 1.8 units. Resident notified while performing rounds on unit of recommendations due to circumstances and resident states to make note and they will make adjustments accordingly. 0940am--Received phone call from Dr. Claudy Morejon, instructed to continue with original order of 10 units lantus and medium sliding scale in the meantime and will reconsider increasing dose of lantus after scans are complete and diet resumed. 1248pm--Dr. Claudy Morejon given update of blood glucose level=153 approximately around 12pm. Patient beginning to drink omnipaque and estimated to go down to CT scan around 13:45pm.  1350pm--Patient transported via wheelchair to CT.  1430pm--Patient back to room from CT scan. 1500pm--Dr. Claudy Morejon informed that Neurosurgery has just rounded on patient, and CT results are in. Also notified that BS=62, 1/2 amp of dextrose given via IV.  Will recheck Discharge Instructions for  [facility]  [address] [address]  Phone: [phone] Fax: [phone]    NAME:  Smooth Naranjo  YOB: 1979  MEDICAL RECORD NUMBER:  [id number]  DATE:  November 5, 2024    WOUND CARE ORDERS:  Right leg wound - Cleanse with baby shampoo. Rinse and pat dry. Apply xeroform to wound bed. Cover with ABD pad. Apply 2 layer compression wrap. Change dressing once a week in clinic.  Please give double layer tubigrip size\"G\" to take home today.    We recommend getting a cast cover to keep right leg dressing dry while showering.       Activity:  [x] Elevate leg(s) above the level of the heart when sitting.  [x] Avoid prolonged standing in one place.   [x] Do no get dressing/wrap wet.       Dietary:  [x] Diet as tolerated      [] Diabetic Diet            [] Increase Protein: examples (Meat, cheese, eggs, greek yogurt, fish, nuts)          [] Chago Therapeutic Nutrition Powder  [] Other:       Return Appointment:  [x] Return Appointment: With Dr. Esau Clinton in 1 Week.  [x] Nurse Visit : Thursday 11/07/24  [] Ordered tests:      Electronically signed Goldie Peterson RN on 11/5/2024 at 8:59 AM     Wound Care Center Information: Should you experience any significant changes in your wound(s) or have questions about your wound care, please contact the [facility] at MONDAY - FRIDAY 8:00 am - 4:30.  If you need help with your wound outside these hours and cannot wait until we are again available, contact your PCP or go to the hospital emergency room.     PLEASE NOTE: IF YOU ARE UNABLE TO OBTAIN WOUND SUPPLIES, CONTINUE TO USE THE SUPPLIES YOU HAVE AVAILABLE UNTIL YOU ARE ABLE TO REACH US. IT IS MOST IMPORTANT TO KEEP THE WOUND COVERED AT ALL TIMES.     Physician Signature:_______________________    Date: ___________ Time:  ____________   in 10 minutes. Inquired about resuming a carb controlled diet. Order received for diet. 1656pm--Perfect serve sent to Internal Medicine resident on-call. Notified them that AU=[id number] at 1515pm, MX=[id number] at 1612pm. Patient voices concern about playing \"catch up\" with her glucose levels now instead of maintaining them. Inquired if misc. nursing order could be placed to check blood glucose Q4 hours. Also, informed that Dr. Osmani Conway mentioned earlier that once scans were complete and diet reordered, would consider adjusting insulin dosing. Writer asks resident if it would be beneficial to refer to Diabetic Educator's note for recommendation of blood sugar coverage according to Carb count like patient normally follows. Will wait for response. 1754pm--2nd Perfect serve sent, asked for clarification of continuous IVF order now that patient has resumed diet and is drinking fluids adequately. 1850pm--3rd Perfect Serve sent to resident, notified that DY=842, will provide coverage according to sliding scale ordered. Inquired if we could reevaluate plan of care for blood sugar control. Order received to administer the 12 units indicated per sliding scale and to recheck blood glucose in 30 minutes, then notify resident of result. 1926pm--Blood glucose rechecked, result is 394. Internal Medicine resident notified, will wait for response. Report given to night shift RN.

## 2025-01-17 NOTE — TELEPHONE ENCOUNTER
Incoming fax requesting refill for Levetiracetam. Please review and e-scribe if applicable.     Last Visit Date: 08/13/2024    Next Visit Date:08/12/2025

## 2025-01-20 RX ORDER — LEVETIRACETAM 750 MG/1
TABLET ORAL
Qty: 60 TABLET | Refills: 10 | Status: SHIPPED | OUTPATIENT
Start: 2025-01-20

## 2025-04-16 ENCOUNTER — HOSPITAL ENCOUNTER (OUTPATIENT)
Age: 50
Discharge: HOME | End: 2025-04-16
Payer: COMMERCIAL

## 2025-04-16 LAB
ALBUMIN SERPL-MCNC: 4.1 G/DL (ref 3.5–5.2)
ALBUMIN/GLOB SERPL: 1.6 {RATIO} (ref 1–2.5)
ALP SERPL-CCNC: 90 U/L (ref 35–104)
ALT SERPL-CCNC: 7 U/L (ref 10–35)
ANION GAP SERPL CALCULATED.3IONS-SCNC: 9 MMOL/L (ref 9–16)
AST SERPL-CCNC: 14 U/L (ref 10–35)
BILIRUB SERPL-MCNC: 0.5 MG/DL (ref 0–1.2)
BUN SERPL-MCNC: 11 MG/DL (ref 6–20)
BUN/CREAT SERPL: 14 (ref 9–20)
CALCIUM SERPL-MCNC: 9.2 MG/DL (ref 8.6–10.4)
CHLORIDE SERPL-SCNC: 104 MMOL/L (ref 98–107)
CO2 SERPL-SCNC: 27 MMOL/L (ref 20–31)
CREAT SERPL-MCNC: 0.8 MG/DL (ref 0.5–0.9)
CREAT UR-MCNC: 155 MG/DL (ref 28–217)
EST. AVERAGE GLUCOSE BLD GHB EST-MCNC: 180 MG/DL
GFR, ESTIMATED: >90 ML/MIN/1.73M2
GLUCOSE SERPL-MCNC: 139 MG/DL (ref 74–99)
HBA1C MFR BLD: 7.9 % (ref 4–6)
MICROALBUMIN UR-MCNC: 17 MG/L (ref 0–20)
MICROALBUMIN/CREAT UR-RTO: 11 MCG/MG CREAT (ref 0–25)
POTASSIUM SERPL-SCNC: 4 MMOL/L (ref 3.7–5.3)
PROT SERPL-MCNC: 6.7 G/DL (ref 6.6–8.7)
SODIUM SERPL-SCNC: 140 MMOL/L (ref 136–145)

## 2025-04-16 PROCEDURE — 80053 COMPREHEN METABOLIC PANEL: CPT

## 2025-04-16 PROCEDURE — 82570 ASSAY OF URINE CREATININE: CPT

## 2025-04-16 PROCEDURE — 83036 HEMOGLOBIN GLYCOSYLATED A1C: CPT

## 2025-04-16 PROCEDURE — 36415 COLL VENOUS BLD VENIPUNCTURE: CPT

## 2025-04-16 PROCEDURE — 82043 UR ALBUMIN QUANTITATIVE: CPT

## 2025-08-12 ENCOUNTER — OFFICE VISIT (OUTPATIENT)
Dept: NEUROLOGY | Age: 50
End: 2025-08-12
Payer: COMMERCIAL

## 2025-08-12 VITALS
HEART RATE: 71 BPM | DIASTOLIC BLOOD PRESSURE: 90 MMHG | WEIGHT: 225.4 LBS | SYSTOLIC BLOOD PRESSURE: 161 MMHG | BODY MASS INDEX: 30.53 KG/M2 | OXYGEN SATURATION: 95 % | HEIGHT: 72 IN

## 2025-08-12 DIAGNOSIS — R56.9 SEIZURES (HCC): Primary | ICD-10-CM

## 2025-08-12 PROCEDURE — G8419 CALC BMI OUT NRM PARAM NOF/U: HCPCS | Performed by: STUDENT IN AN ORGANIZED HEALTH CARE EDUCATION/TRAINING PROGRAM

## 2025-08-12 PROCEDURE — G8427 DOCREV CUR MEDS BY ELIG CLIN: HCPCS | Performed by: STUDENT IN AN ORGANIZED HEALTH CARE EDUCATION/TRAINING PROGRAM

## 2025-08-12 PROCEDURE — 99214 OFFICE O/P EST MOD 30 MIN: CPT | Performed by: STUDENT IN AN ORGANIZED HEALTH CARE EDUCATION/TRAINING PROGRAM

## 2025-08-12 PROCEDURE — 4004F PT TOBACCO SCREEN RCVD TLK: CPT | Performed by: STUDENT IN AN ORGANIZED HEALTH CARE EDUCATION/TRAINING PROGRAM

## 2025-08-12 PROCEDURE — 3017F COLORECTAL CA SCREEN DOC REV: CPT | Performed by: STUDENT IN AN ORGANIZED HEALTH CARE EDUCATION/TRAINING PROGRAM

## 2025-08-12 RX ORDER — LEVETIRACETAM 500 MG/1
TABLET ORAL
Qty: 60 TABLET | Refills: 5 | Status: CANCELLED | OUTPATIENT
Start: 2025-08-12